# Patient Record
Sex: FEMALE | Race: WHITE | NOT HISPANIC OR LATINO | URBAN - METROPOLITAN AREA
[De-identification: names, ages, dates, MRNs, and addresses within clinical notes are randomized per-mention and may not be internally consistent; named-entity substitution may affect disease eponyms.]

---

## 2014-04-18 RX ORDER — DILTIAZEM HCL 120 MG
1 CAPSULE, EXT RELEASE 24 HR ORAL
Qty: 0 | Refills: 0 | DISCHARGE
Start: 2014-04-18

## 2017-03-02 ENCOUNTER — EMERGENCY (EMERGENCY)
Facility: HOSPITAL | Age: 57
LOS: 1 days | Discharge: HOME | End: 2017-03-02
Attending: EMERGENCY MEDICINE | Admitting: EMERGENCY MEDICINE
Payer: MEDICARE

## 2017-03-02 VITALS
DIASTOLIC BLOOD PRESSURE: 65 MMHG | HEART RATE: 99 BPM | SYSTOLIC BLOOD PRESSURE: 108 MMHG | OXYGEN SATURATION: 99 % | RESPIRATION RATE: 16 BRPM | TEMPERATURE: 98 F

## 2017-03-02 VITALS — HEIGHT: 63 IN | RESPIRATION RATE: 18 BRPM | WEIGHT: 139.99 LBS

## 2017-03-02 DIAGNOSIS — E87.0 HYPEROSMOLALITY AND HYPERNATREMIA: ICD-10-CM

## 2017-03-02 DIAGNOSIS — Z79.52 LONG TERM (CURRENT) USE OF SYSTEMIC STEROIDS: ICD-10-CM

## 2017-03-02 DIAGNOSIS — F72 SEVERE INTELLECTUAL DISABILITIES: ICD-10-CM

## 2017-03-02 DIAGNOSIS — Z79.2 LONG TERM (CURRENT) USE OF ANTIBIOTICS: ICD-10-CM

## 2017-03-02 DIAGNOSIS — Z79.82 LONG TERM (CURRENT) USE OF ASPIRIN: ICD-10-CM

## 2017-03-02 DIAGNOSIS — R79.89 OTHER SPECIFIED ABNORMAL FINDINGS OF BLOOD CHEMISTRY: ICD-10-CM

## 2017-03-02 LAB
ALBUMIN SERPL ELPH-MCNC: 3.5 G/DL — SIGNIFICANT CHANGE UP (ref 3.3–5)
ALP SERPL-CCNC: 73 U/L — SIGNIFICANT CHANGE UP (ref 40–120)
ALT FLD-CCNC: 26 U/L — SIGNIFICANT CHANGE UP (ref 12–78)
ANION GAP SERPL CALC-SCNC: 5 MMOL/L — SIGNIFICANT CHANGE UP (ref 5–17)
AST SERPL-CCNC: 16 U/L — SIGNIFICANT CHANGE UP (ref 15–37)
BASOPHILS # BLD AUTO: 0.1 K/UL — SIGNIFICANT CHANGE UP (ref 0–0.2)
BASOPHILS NFR BLD AUTO: 1.7 % — SIGNIFICANT CHANGE UP (ref 0–2)
BILIRUB SERPL-MCNC: 0.3 MG/DL — SIGNIFICANT CHANGE UP (ref 0.2–1.2)
BUN SERPL-MCNC: 18 MG/DL — SIGNIFICANT CHANGE UP (ref 7–23)
CALCIUM SERPL-MCNC: 10.1 MG/DL — SIGNIFICANT CHANGE UP (ref 8.5–10.1)
CHLORIDE SERPL-SCNC: 104 MMOL/L — SIGNIFICANT CHANGE UP (ref 96–108)
CO2 SERPL-SCNC: 35 MMOL/L — HIGH (ref 22–31)
CREAT SERPL-MCNC: 0.59 MG/DL — SIGNIFICANT CHANGE UP (ref 0.5–1.3)
EOSINOPHIL # BLD AUTO: 0.3 K/UL — SIGNIFICANT CHANGE UP (ref 0–0.5)
EOSINOPHIL NFR BLD AUTO: 3.6 % — SIGNIFICANT CHANGE UP (ref 0–6)
GLUCOSE SERPL-MCNC: 92 MG/DL — SIGNIFICANT CHANGE UP (ref 70–99)
HCT VFR BLD CALC: 48.6 % — HIGH (ref 34.5–45)
HGB BLD-MCNC: 15.5 G/DL — SIGNIFICANT CHANGE UP (ref 11.5–15.5)
LYMPHOCYTES # BLD AUTO: 4.2 K/UL — HIGH (ref 1–3.3)
LYMPHOCYTES # BLD AUTO: 49.6 % — HIGH (ref 13–44)
MCHC RBC-ENTMCNC: 31.5 PG — SIGNIFICANT CHANGE UP (ref 27–34)
MCHC RBC-ENTMCNC: 31.8 GM/DL — LOW (ref 32–36)
MCV RBC AUTO: 99.1 FL — SIGNIFICANT CHANGE UP (ref 80–100)
MONOCYTES # BLD AUTO: 1.3 K/UL — HIGH (ref 0–0.9)
MONOCYTES NFR BLD AUTO: 15.3 % — HIGH (ref 1–9)
NEUTROPHILS # BLD AUTO: 2.6 K/UL — SIGNIFICANT CHANGE UP (ref 1.8–7.4)
NEUTROPHILS NFR BLD AUTO: 29.8 % — LOW (ref 43–77)
PLATELET # BLD AUTO: 303 K/UL — SIGNIFICANT CHANGE UP (ref 150–400)
POTASSIUM SERPL-MCNC: 4.1 MMOL/L — SIGNIFICANT CHANGE UP (ref 3.5–5.3)
POTASSIUM SERPL-SCNC: 4.1 MMOL/L — SIGNIFICANT CHANGE UP (ref 3.5–5.3)
PROT SERPL-MCNC: 7.8 G/DL — SIGNIFICANT CHANGE UP (ref 6–8.3)
RBC # BLD: 4.91 M/UL — SIGNIFICANT CHANGE UP (ref 3.8–5.2)
RBC # FLD: 14.4 % — SIGNIFICANT CHANGE UP (ref 10.3–14.5)
SODIUM SERPL-SCNC: 144 MMOL/L — SIGNIFICANT CHANGE UP (ref 135–145)
WBC # BLD: 8.6 K/UL — SIGNIFICANT CHANGE UP (ref 3.8–10.5)
WBC # FLD AUTO: 8.6 K/UL — SIGNIFICANT CHANGE UP (ref 3.8–10.5)

## 2017-03-02 PROCEDURE — 99283 EMERGENCY DEPT VISIT LOW MDM: CPT | Mod: 25

## 2017-03-02 PROCEDURE — 85027 COMPLETE CBC AUTOMATED: CPT

## 2017-03-02 PROCEDURE — 93005 ELECTROCARDIOGRAM TRACING: CPT

## 2017-03-02 PROCEDURE — 80053 COMPREHEN METABOLIC PANEL: CPT

## 2017-03-02 PROCEDURE — 99284 EMERGENCY DEPT VISIT MOD MDM: CPT

## 2017-03-02 NOTE — ED PROVIDER NOTE - MEDICAL DECISION MAKING DETAILS
pt sent from group home by Dr. Ad Mosqueda for elevated sodium level on routine screening.  repeat blood work.

## 2017-03-02 NOTE — ED ADULT NURSE NOTE - CHPI ED SYMPTOMS NEG
no pain/no back pain/no fever/no dizziness/no headache/no nausea/no vomiting/no decreased eating/drinking/no chills/no loss of consciousness

## 2017-03-02 NOTE — ED PROVIDER NOTE - OBJECTIVE STATEMENT
55 y/o F with severe MR from a group home sent in by Dr. Ad Mosqueda for elevated sodium.  Caretaker states pt is at baseline with her mental status and the results were on routine blood work.  pt is unable to provide any hx secondary to mental status.

## 2017-03-02 NOTE — ED ADULT NURSE NOTE - OBJECTIVE STATEMENT
Pt alert, accompanied by caregiver, brought to ED for elevated serum sodium, labs drawn and sent, advised caregiver on plan of care/hand hygiene, verbalized understanding, awaititng dispo.

## 2017-03-17 ENCOUNTER — NON-APPOINTMENT (OUTPATIENT)
Age: 57
End: 2017-03-17

## 2017-03-17 ENCOUNTER — APPOINTMENT (OUTPATIENT)
Dept: CARDIOLOGY | Facility: CLINIC | Age: 57
End: 2017-03-17

## 2017-09-29 ENCOUNTER — NON-APPOINTMENT (OUTPATIENT)
Age: 57
End: 2017-09-29

## 2017-09-29 ENCOUNTER — APPOINTMENT (OUTPATIENT)
Dept: CARDIOLOGY | Facility: CLINIC | Age: 57
End: 2017-09-29
Payer: MEDICARE

## 2017-09-29 VITALS — WEIGHT: 145 LBS | BODY MASS INDEX: 26.52 KG/M2 | HEART RATE: 57 BPM

## 2017-09-29 VITALS — SYSTOLIC BLOOD PRESSURE: 126 MMHG | DIASTOLIC BLOOD PRESSURE: 76 MMHG

## 2017-09-29 PROCEDURE — 93000 ELECTROCARDIOGRAM COMPLETE: CPT

## 2017-09-29 PROCEDURE — 99214 OFFICE O/P EST MOD 30 MIN: CPT | Mod: 25

## 2017-11-10 ENCOUNTER — APPOINTMENT (OUTPATIENT)
Dept: CARDIOLOGY | Facility: CLINIC | Age: 57
End: 2017-11-10
Payer: MEDICARE

## 2017-11-10 ENCOUNTER — NON-APPOINTMENT (OUTPATIENT)
Age: 57
End: 2017-11-10

## 2017-11-10 VITALS — HEIGHT: 62 IN | DIASTOLIC BLOOD PRESSURE: 65 MMHG | HEART RATE: 82 BPM | SYSTOLIC BLOOD PRESSURE: 112 MMHG

## 2017-11-10 PROCEDURE — 93000 ELECTROCARDIOGRAM COMPLETE: CPT

## 2017-11-10 PROCEDURE — 99214 OFFICE O/P EST MOD 30 MIN: CPT | Mod: 25

## 2017-11-22 RX ORDER — DILTIAZEM HYDROCHLORIDE 300 MG/1
300 CAPSULE, EXTENDED RELEASE ORAL DAILY
Qty: 90 | Refills: 1 | Status: ACTIVE | COMMUNITY
Start: 2017-09-29 | End: 1900-01-01

## 2018-02-09 ENCOUNTER — APPOINTMENT (OUTPATIENT)
Dept: CARDIOLOGY | Facility: CLINIC | Age: 58
End: 2018-02-09
Payer: MEDICARE

## 2018-02-09 ENCOUNTER — NON-APPOINTMENT (OUTPATIENT)
Age: 58
End: 2018-02-09

## 2018-02-09 VITALS — WEIGHT: 145 LBS | BODY MASS INDEX: 26.68 KG/M2 | HEIGHT: 62 IN

## 2018-02-09 VITALS — DIASTOLIC BLOOD PRESSURE: 50 MMHG | SYSTOLIC BLOOD PRESSURE: 110 MMHG

## 2018-02-09 PROCEDURE — 99214 OFFICE O/P EST MOD 30 MIN: CPT | Mod: 25

## 2018-02-09 PROCEDURE — 93000 ELECTROCARDIOGRAM COMPLETE: CPT

## 2018-05-21 ENCOUNTER — EMERGENCY (EMERGENCY)
Facility: HOSPITAL | Age: 58
LOS: 1 days | Discharge: HOME | End: 2018-05-21
Attending: EMERGENCY MEDICINE
Payer: MEDICARE

## 2018-05-21 VITALS
TEMPERATURE: 96 F | RESPIRATION RATE: 18 BRPM | DIASTOLIC BLOOD PRESSURE: 74 MMHG | HEART RATE: 77 BPM | OXYGEN SATURATION: 96 % | SYSTOLIC BLOOD PRESSURE: 144 MMHG | HEIGHT: 63 IN | WEIGHT: 139.99 LBS

## 2018-05-21 LAB
ALBUMIN SERPL ELPH-MCNC: 3.4 G/DL — SIGNIFICANT CHANGE UP (ref 3.3–5)
ALP SERPL-CCNC: 88 U/L — SIGNIFICANT CHANGE UP (ref 40–120)
ALT FLD-CCNC: 25 U/L — SIGNIFICANT CHANGE UP (ref 12–78)
ANION GAP SERPL CALC-SCNC: 10 MMOL/L — SIGNIFICANT CHANGE UP (ref 5–17)
APPEARANCE UR: ABNORMAL
AST SERPL-CCNC: 14 U/L — LOW (ref 15–37)
BACTERIA # UR AUTO: ABNORMAL
BASOPHILS # BLD AUTO: 0.2 K/UL — SIGNIFICANT CHANGE UP (ref 0–0.2)
BASOPHILS NFR BLD AUTO: 0.8 % — SIGNIFICANT CHANGE UP (ref 0–2)
BILIRUB SERPL-MCNC: 0.4 MG/DL — SIGNIFICANT CHANGE UP (ref 0.2–1.2)
BILIRUB UR-MCNC: ABNORMAL
BUN SERPL-MCNC: 28 MG/DL — HIGH (ref 7–23)
CALCIUM SERPL-MCNC: 8.7 MG/DL — SIGNIFICANT CHANGE UP (ref 8.5–10.1)
CHLORIDE SERPL-SCNC: 106 MMOL/L — SIGNIFICANT CHANGE UP (ref 96–108)
CK MB BLD-MCNC: 6.7 % — HIGH (ref 0–3.5)
CK MB CFR SERPL CALC: 1.6 NG/ML — SIGNIFICANT CHANGE UP (ref 0–3.6)
CK SERPL-CCNC: 24 U/L — LOW (ref 26–192)
CO2 SERPL-SCNC: 26 MMOL/L — SIGNIFICANT CHANGE UP (ref 22–31)
COLOR SPEC: YELLOW — SIGNIFICANT CHANGE UP
COMMENT - URINE: SIGNIFICANT CHANGE UP
CREAT SERPL-MCNC: 0.62 MG/DL — SIGNIFICANT CHANGE UP (ref 0.5–1.3)
DIFF PNL FLD: ABNORMAL
EOSINOPHIL # BLD AUTO: 0 K/UL — SIGNIFICANT CHANGE UP (ref 0–0.5)
EOSINOPHIL NFR BLD AUTO: 0.1 % — SIGNIFICANT CHANGE UP (ref 0–6)
EPI CELLS # UR: SIGNIFICANT CHANGE UP
GLUCOSE SERPL-MCNC: 97 MG/DL — SIGNIFICANT CHANGE UP (ref 70–99)
GLUCOSE UR QL: NEGATIVE — SIGNIFICANT CHANGE UP
HCT VFR BLD CALC: 45.3 % — HIGH (ref 34.5–45)
HGB BLD-MCNC: 15 G/DL — SIGNIFICANT CHANGE UP (ref 11.5–15.5)
HYALINE CASTS # UR AUTO: ABNORMAL /LPF
INR BLD: 1.18 RATIO — HIGH (ref 0.88–1.16)
KETONES UR-MCNC: ABNORMAL
LACTATE SERPL-SCNC: 1.5 MMOL/L — SIGNIFICANT CHANGE UP (ref 0.7–2)
LEUKOCYTE ESTERASE UR-ACNC: ABNORMAL
LYMPHOCYTES # BLD AUTO: 21 % — SIGNIFICANT CHANGE UP (ref 13–44)
LYMPHOCYTES # BLD AUTO: 4.6 K/UL — HIGH (ref 1–3.3)
MCHC RBC-ENTMCNC: 32.8 PG — SIGNIFICANT CHANGE UP (ref 27–34)
MCHC RBC-ENTMCNC: 33.1 GM/DL — SIGNIFICANT CHANGE UP (ref 32–36)
MCV RBC AUTO: 98.8 FL — SIGNIFICANT CHANGE UP (ref 80–100)
MONOCYTES # BLD AUTO: 3.4 K/UL — HIGH (ref 0–0.9)
MONOCYTES NFR BLD AUTO: 15.5 % — HIGH (ref 1–9)
NEUTROPHILS # BLD AUTO: 13.8 K/UL — HIGH (ref 1.8–7.4)
NEUTROPHILS NFR BLD AUTO: 62.6 % — SIGNIFICANT CHANGE UP (ref 43–77)
NITRITE UR-MCNC: NEGATIVE — SIGNIFICANT CHANGE UP
PH UR: 5 — SIGNIFICANT CHANGE UP (ref 5–8)
PLATELET # BLD AUTO: 214 K/UL — SIGNIFICANT CHANGE UP (ref 150–400)
POTASSIUM SERPL-MCNC: 4 MMOL/L — SIGNIFICANT CHANGE UP (ref 3.5–5.3)
POTASSIUM SERPL-SCNC: 4 MMOL/L — SIGNIFICANT CHANGE UP (ref 3.5–5.3)
PROT SERPL-MCNC: 7.2 G/DL — SIGNIFICANT CHANGE UP (ref 6–8.3)
PROT UR-MCNC: NEGATIVE — SIGNIFICANT CHANGE UP
PROTHROM AB SERPL-ACNC: 12.9 SEC — HIGH (ref 9.8–12.7)
RBC # BLD: 4.58 M/UL — SIGNIFICANT CHANGE UP (ref 3.8–5.2)
RBC # FLD: 15.7 % — HIGH (ref 10.3–14.5)
RBC CASTS # UR COMP ASSIST: ABNORMAL /HPF (ref 0–4)
SODIUM SERPL-SCNC: 142 MMOL/L — SIGNIFICANT CHANGE UP (ref 135–145)
SP GR SPEC: 1.01 — SIGNIFICANT CHANGE UP (ref 1.01–1.02)
TROPONIN I SERPL-MCNC: <.015 NG/ML — SIGNIFICANT CHANGE UP (ref 0.01–0.04)
UROBILINOGEN FLD QL: 1
WBC # BLD: 22 K/UL — HIGH (ref 3.8–10.5)
WBC # FLD AUTO: 22 K/UL — HIGH (ref 3.8–10.5)
WBC UR QL: SIGNIFICANT CHANGE UP

## 2018-05-21 PROCEDURE — 99285 EMERGENCY DEPT VISIT HI MDM: CPT

## 2018-05-21 PROCEDURE — 93010 ELECTROCARDIOGRAM REPORT: CPT

## 2018-05-21 RX ORDER — SODIUM CHLORIDE 9 MG/ML
1000 INJECTION INTRAMUSCULAR; INTRAVENOUS; SUBCUTANEOUS ONCE
Qty: 0 | Refills: 0 | Status: COMPLETED | OUTPATIENT
Start: 2018-05-21 | End: 2018-05-21

## 2018-05-21 RX ADMIN — SODIUM CHLORIDE 1000 MILLILITER(S): 9 INJECTION INTRAMUSCULAR; INTRAVENOUS; SUBCUTANEOUS at 23:13

## 2018-05-21 NOTE — ED ADULT NURSE REASSESSMENT NOTE - NS ED NURSE REASSESS COMMENT FT1
Received patient from previous shift with family member at the bedside informed of the plan of care.

## 2018-05-21 NOTE — ED ADULT TRIAGE NOTE - CHIEF COMPLAINT QUOTE
Patient brought in by family and care giver as reported has been having loose stool since yesterday was noted to have temperature at home 99.5 F prior to ED arrival

## 2018-05-22 VITALS
OXYGEN SATURATION: 97 % | RESPIRATION RATE: 18 BRPM | DIASTOLIC BLOOD PRESSURE: 58 MMHG | SYSTOLIC BLOOD PRESSURE: 111 MMHG | HEART RATE: 92 BPM

## 2018-05-22 LAB
BASOPHILS # BLD AUTO: 0.1 K/UL — SIGNIFICANT CHANGE UP (ref 0–0.2)
BASOPHILS NFR BLD AUTO: 0.5 % — SIGNIFICANT CHANGE UP (ref 0–2)
EOSINOPHIL # BLD AUTO: 0 K/UL — SIGNIFICANT CHANGE UP (ref 0–0.5)
EOSINOPHIL NFR BLD AUTO: 0.2 % — SIGNIFICANT CHANGE UP (ref 0–6)
HCT VFR BLD CALC: 36 % — SIGNIFICANT CHANGE UP (ref 34.5–45)
HGB BLD-MCNC: 12.2 G/DL — SIGNIFICANT CHANGE UP (ref 11.5–15.5)
LYMPHOCYTES # BLD AUTO: 24.9 % — SIGNIFICANT CHANGE UP (ref 13–44)
LYMPHOCYTES # BLD AUTO: 4.3 K/UL — HIGH (ref 1–3.3)
MCHC RBC-ENTMCNC: 33.4 PG — SIGNIFICANT CHANGE UP (ref 27–34)
MCHC RBC-ENTMCNC: 33.8 GM/DL — SIGNIFICANT CHANGE UP (ref 32–36)
MCV RBC AUTO: 98.6 FL — SIGNIFICANT CHANGE UP (ref 80–100)
MONOCYTES # BLD AUTO: 2.3 K/UL — HIGH (ref 0–0.9)
MONOCYTES NFR BLD AUTO: 13.7 % — HIGH (ref 1–9)
NEUTROPHILS # BLD AUTO: 10.4 K/UL — HIGH (ref 1.8–7.4)
NEUTROPHILS NFR BLD AUTO: 60.6 % — SIGNIFICANT CHANGE UP (ref 43–77)
PLATELET # BLD AUTO: 159 K/UL — SIGNIFICANT CHANGE UP (ref 150–400)
RBC # BLD: 3.65 M/UL — LOW (ref 3.8–5.2)
RBC # FLD: 15.2 % — HIGH (ref 10.3–14.5)
WBC # BLD: 17.1 K/UL — HIGH (ref 3.8–10.5)
WBC # FLD AUTO: 17.1 K/UL — HIGH (ref 3.8–10.5)

## 2018-05-22 PROCEDURE — 96375 TX/PRO/DX INJ NEW DRUG ADDON: CPT

## 2018-05-22 PROCEDURE — 82553 CREATINE MB FRACTION: CPT

## 2018-05-22 PROCEDURE — 96365 THER/PROPH/DIAG IV INF INIT: CPT

## 2018-05-22 PROCEDURE — 87040 BLOOD CULTURE FOR BACTERIA: CPT

## 2018-05-22 PROCEDURE — 71250 CT THORAX DX C-: CPT | Mod: 26

## 2018-05-22 PROCEDURE — 71250 CT THORAX DX C-: CPT

## 2018-05-22 PROCEDURE — 93005 ELECTROCARDIOGRAM TRACING: CPT

## 2018-05-22 PROCEDURE — 99284 EMERGENCY DEPT VISIT MOD MDM: CPT | Mod: 25

## 2018-05-22 PROCEDURE — 85610 PROTHROMBIN TIME: CPT

## 2018-05-22 PROCEDURE — 84484 ASSAY OF TROPONIN QUANT: CPT

## 2018-05-22 PROCEDURE — 82550 ASSAY OF CK (CPK): CPT

## 2018-05-22 PROCEDURE — 83605 ASSAY OF LACTIC ACID: CPT

## 2018-05-22 PROCEDURE — 96361 HYDRATE IV INFUSION ADD-ON: CPT

## 2018-05-22 PROCEDURE — 80053 COMPREHEN METABOLIC PANEL: CPT

## 2018-05-22 PROCEDURE — 85027 COMPLETE CBC AUTOMATED: CPT

## 2018-05-22 PROCEDURE — 74176 CT ABD & PELVIS W/O CONTRAST: CPT

## 2018-05-22 PROCEDURE — 81001 URINALYSIS AUTO W/SCOPE: CPT

## 2018-05-22 PROCEDURE — 74176 CT ABD & PELVIS W/O CONTRAST: CPT | Mod: 26

## 2018-05-22 PROCEDURE — 87086 URINE CULTURE/COLONY COUNT: CPT

## 2018-05-22 RX ORDER — PIPERACILLIN AND TAZOBACTAM 4; .5 G/20ML; G/20ML
3.38 INJECTION, POWDER, LYOPHILIZED, FOR SOLUTION INTRAVENOUS ONCE
Qty: 0 | Refills: 0 | Status: COMPLETED | OUTPATIENT
Start: 2018-05-22 | End: 2018-05-22

## 2018-05-22 RX ORDER — PANTOPRAZOLE SODIUM 20 MG/1
40 TABLET, DELAYED RELEASE ORAL ONCE
Qty: 0 | Refills: 0 | Status: COMPLETED | OUTPATIENT
Start: 2018-05-22 | End: 2018-05-22

## 2018-05-22 RX ADMIN — PIPERACILLIN AND TAZOBACTAM 200 GRAM(S): 4; .5 INJECTION, POWDER, LYOPHILIZED, FOR SOLUTION INTRAVENOUS at 01:14

## 2018-05-22 RX ADMIN — SODIUM CHLORIDE 1000 MILLILITER(S): 9 INJECTION INTRAMUSCULAR; INTRAVENOUS; SUBCUTANEOUS at 00:28

## 2018-05-22 RX ADMIN — SODIUM CHLORIDE 1000 MILLILITER(S): 9 INJECTION INTRAMUSCULAR; INTRAVENOUS; SUBCUTANEOUS at 00:27

## 2018-05-22 RX ADMIN — PIPERACILLIN AND TAZOBACTAM 3.38 GRAM(S): 4; .5 INJECTION, POWDER, LYOPHILIZED, FOR SOLUTION INTRAVENOUS at 01:46

## 2018-05-22 RX ADMIN — SODIUM CHLORIDE 1000 MILLILITER(S): 9 INJECTION INTRAMUSCULAR; INTRAVENOUS; SUBCUTANEOUS at 00:29

## 2018-05-22 RX ADMIN — PANTOPRAZOLE SODIUM 40 MILLIGRAM(S): 20 TABLET, DELAYED RELEASE ORAL at 01:14

## 2018-05-22 NOTE — ED PROVIDER NOTE - PMH
Chest pain    COPD (chronic obstructive pulmonary disease)    MR (mental retardation), severe  immobilty sec to severe dissability  Osteoporosis    PVD (peripheral vascular disease)    Scoliosis    Seizure

## 2018-05-22 NOTE — ED PROVIDER NOTE - OBJECTIVE STATEMENT
57 female presents to ER with aide at the bedside states patient has had temperature of 99, not eating or drinking for the past 2 days. 57 female presents to ER with aide at the bedside states patient has had temperature of 99, not eating or drinking for the past 2 days, loose stool.

## 2018-05-23 LAB
CULTURE RESULTS: NO GROWTH — SIGNIFICANT CHANGE UP
SPECIMEN SOURCE: SIGNIFICANT CHANGE UP

## 2018-05-27 LAB
CULTURE RESULTS: SIGNIFICANT CHANGE UP
CULTURE RESULTS: SIGNIFICANT CHANGE UP
SPECIMEN SOURCE: SIGNIFICANT CHANGE UP
SPECIMEN SOURCE: SIGNIFICANT CHANGE UP

## 2018-06-26 ENCOUNTER — NON-APPOINTMENT (OUTPATIENT)
Age: 58
End: 2018-06-26

## 2018-06-26 ENCOUNTER — APPOINTMENT (OUTPATIENT)
Dept: CARDIOLOGY | Facility: CLINIC | Age: 58
End: 2018-06-26
Payer: MEDICARE

## 2018-06-26 VITALS
SYSTOLIC BLOOD PRESSURE: 110 MMHG | WEIGHT: 142 LBS | HEIGHT: 62 IN | DIASTOLIC BLOOD PRESSURE: 73 MMHG | HEART RATE: 52 BPM | OXYGEN SATURATION: 93 % | BODY MASS INDEX: 26.13 KG/M2

## 2018-06-26 DIAGNOSIS — D64.9 ANEMIA, UNSPECIFIED: ICD-10-CM

## 2018-06-26 PROCEDURE — 99214 OFFICE O/P EST MOD 30 MIN: CPT | Mod: 25

## 2018-06-26 PROCEDURE — 93000 ELECTROCARDIOGRAM COMPLETE: CPT

## 2018-08-07 ENCOUNTER — APPOINTMENT (OUTPATIENT)
Dept: CARDIOLOGY | Facility: CLINIC | Age: 58
End: 2018-08-07
Payer: MEDICARE

## 2018-08-07 ENCOUNTER — NON-APPOINTMENT (OUTPATIENT)
Age: 58
End: 2018-08-07

## 2018-08-07 VITALS — HEIGHT: 62 IN | WEIGHT: 142 LBS | BODY MASS INDEX: 26.13 KG/M2 | HEART RATE: 60 BPM | OXYGEN SATURATION: 92 %

## 2018-08-07 VITALS — BODY MASS INDEX: 26.31 KG/M2 | HEIGHT: 62 IN | WEIGHT: 143 LBS

## 2018-08-07 VITALS — DIASTOLIC BLOOD PRESSURE: 66 MMHG | SYSTOLIC BLOOD PRESSURE: 112 MMHG

## 2018-08-07 DIAGNOSIS — Z01.810 ENCOUNTER FOR PREPROCEDURAL CARDIOVASCULAR EXAMINATION: ICD-10-CM

## 2018-08-07 PROCEDURE — 99214 OFFICE O/P EST MOD 30 MIN: CPT | Mod: 25

## 2018-08-07 PROCEDURE — 93000 ELECTROCARDIOGRAM COMPLETE: CPT

## 2018-08-07 RX ORDER — MONTELUKAST 10 MG/1
10 TABLET, FILM COATED ORAL
Refills: 2 | Status: ACTIVE | COMMUNITY

## 2018-08-07 RX ORDER — FOLIC ACID 1 MG/1
1 TABLET ORAL DAILY
Refills: 0 | Status: ACTIVE | COMMUNITY

## 2018-08-10 ENCOUNTER — APPOINTMENT (OUTPATIENT)
Dept: CARDIOLOGY | Facility: CLINIC | Age: 58
End: 2018-08-10

## 2018-10-05 ENCOUNTER — APPOINTMENT (OUTPATIENT)
Dept: CARDIOLOGY | Facility: CLINIC | Age: 58
End: 2018-10-05

## 2018-12-11 ENCOUNTER — APPOINTMENT (OUTPATIENT)
Dept: CARDIOLOGY | Facility: CLINIC | Age: 58
End: 2018-12-11

## 2019-03-28 ENCOUNTER — NON-APPOINTMENT (OUTPATIENT)
Age: 59
End: 2019-03-28

## 2019-03-28 ENCOUNTER — APPOINTMENT (OUTPATIENT)
Dept: CARDIOLOGY | Facility: CLINIC | Age: 59
End: 2019-03-28
Payer: MEDICARE

## 2019-03-28 VITALS — BODY MASS INDEX: 26.31 KG/M2 | WEIGHT: 143 LBS | HEIGHT: 62 IN

## 2019-03-28 VITALS — DIASTOLIC BLOOD PRESSURE: 60 MMHG | SYSTOLIC BLOOD PRESSURE: 110 MMHG

## 2019-03-28 DIAGNOSIS — F79 UNSPECIFIED INTELLECTUAL DISABILITIES: ICD-10-CM

## 2019-03-28 DIAGNOSIS — J45.909 UNSPECIFIED ASTHMA, UNCOMPLICATED: ICD-10-CM

## 2019-03-28 PROCEDURE — 93000 ELECTROCARDIOGRAM COMPLETE: CPT

## 2019-03-28 PROCEDURE — 99214 OFFICE O/P EST MOD 30 MIN: CPT | Mod: 25

## 2019-03-28 RX ORDER — TRAVOPROST (BENZALKONIUM) 0.004 %
0 DROPS OPHTHALMIC (EYE)
Refills: 0 | Status: ACTIVE | COMMUNITY

## 2019-03-28 RX ORDER — IPRATROPIUM BROMIDE AND ALBUTEROL SULFATE 2.5; .5 MG/3ML; MG/3ML
0.5-2.5 (3) SOLUTION RESPIRATORY (INHALATION) 4 TIMES DAILY
Refills: 4 | Status: ACTIVE | COMMUNITY

## 2019-03-28 NOTE — HISTORY OF PRESENT ILLNESS
[FreeTextEntry1] : This is a 59 Y/O female who is mentally challenged residing in a group home with H/O well controlled HTN,  PAF on Cardizem, Asthma, Osteoporosis, recurrent atrial fibrillation. Patient  non communicative ,\par \par Patient is not very cooperative today , \par \par \par Patient blood pressure is controlled , lipid profile normal range  her asthma well controlled \par \par Patient not on ecotrin as patient gets bruises very easily \par \par

## 2019-03-28 NOTE — PHYSICAL EXAM
[General Appearance - Well Developed] : well developed [Normal Appearance] : normal appearance [Well Groomed] : well groomed [General Appearance - Well Nourished] : well nourished [No Deformities] : no deformities [General Appearance - In No Acute Distress] : no acute distress [Normal Conjunctiva] : the conjunctiva exhibited no abnormalities [] : no respiratory distress [Respiration, Rhythm And Depth] : normal respiratory rhythm and effort [Exaggerated Use Of Accessory Muscles For Inspiration] : no accessory muscle use [Heart Sounds] : normal S1 and S2 [Normal] : the heart rate was normal [Irregularly Irregular] : the rhythm was irregularly irregular [Abdomen Soft] : soft [Skin Color & Pigmentation] : normal skin color and pigmentation [FreeTextEntry1] : Mentally challenged

## 2019-03-28 NOTE — ASSESSMENT
[FreeTextEntry1] : Patient with advanced mental retardation ,uncooperative  CHADS vasc 1  .Patient is high risk for falls \par after discussion with parents , though patient at mild increased risk of stroke ,atrial fibrillation with controlled rate   \par \par  asthma controlled \par \par \par patients parents understand the risk and benefits .monitor TSH level , \par \par continue her current medication , \par \par follow up after 6 months

## 2019-03-28 NOTE — REASON FOR VISIT
[Follow-Up - Clinic] : a clinic follow-up of [Atrial Fibrillation] : atrial fibrillation [Hypertension] : hypertension [Medication Management] : Medication management [FreeTextEntry1] : f/u

## 2019-10-03 ENCOUNTER — APPOINTMENT (OUTPATIENT)
Dept: CARDIOLOGY | Facility: CLINIC | Age: 59
End: 2019-10-03
Payer: MEDICARE

## 2019-10-03 ENCOUNTER — NON-APPOINTMENT (OUTPATIENT)
Age: 59
End: 2019-10-03

## 2019-10-03 VITALS
WEIGHT: 142 LBS | OXYGEN SATURATION: 94 % | BODY MASS INDEX: 26.13 KG/M2 | SYSTOLIC BLOOD PRESSURE: 106 MMHG | HEART RATE: 62 BPM | DIASTOLIC BLOOD PRESSURE: 70 MMHG | HEIGHT: 62 IN

## 2019-10-03 DIAGNOSIS — I10 ESSENTIAL (PRIMARY) HYPERTENSION: ICD-10-CM

## 2019-10-03 DIAGNOSIS — R94.31 ABNORMAL ELECTROCARDIOGRAM [ECG] [EKG]: ICD-10-CM

## 2019-10-03 DIAGNOSIS — I48.0 PAROXYSMAL ATRIAL FIBRILLATION: ICD-10-CM

## 2019-10-03 PROCEDURE — 93000 ELECTROCARDIOGRAM COMPLETE: CPT

## 2019-10-03 PROCEDURE — 99214 OFFICE O/P EST MOD 30 MIN: CPT | Mod: 25

## 2019-10-03 RX ORDER — HYDROXYZINE HCL 25 MG
25 TABLET ORAL
Refills: 0 | Status: ACTIVE | COMMUNITY

## 2019-10-03 RX ORDER — BUDESONIDE 0.5 MG/2ML
0.5 INHALANT ORAL DAILY
Refills: 0 | Status: ACTIVE | COMMUNITY
Start: 2018-02-04

## 2019-10-03 RX ORDER — BUDESONIDE AND FORMOTEROL FUMARATE DIHYDRATE 160; 4.5 UG/1; UG/1
160-4.5 AEROSOL RESPIRATORY (INHALATION) TWICE DAILY
Qty: 1 | Refills: 5 | Status: ACTIVE | COMMUNITY

## 2019-10-03 RX ORDER — DIVALPROEX SODIUM 125 MG/1
125 CAPSULE, COATED PELLETS ORAL
Refills: 0 | Status: ACTIVE | COMMUNITY

## 2019-10-03 RX ORDER — ASPIRIN 325 MG/1
325 TABLET, FILM COATED ORAL
Qty: 90 | Refills: 3 | Status: DISCONTINUED | COMMUNITY
End: 2019-10-03

## 2019-10-03 RX ORDER — ALBUTEROL 90 MCG
90 AEROSOL (GRAM) INHALATION
Refills: 0 | Status: ACTIVE | COMMUNITY

## 2019-10-03 RX ORDER — ACETAMINOPHEN, DEXTROMETHORPHAN HBR, DIPHENPHYDRAMINE HCL, GUAIFENESIN
KIT
Refills: 0 | Status: ACTIVE | COMMUNITY

## 2019-10-03 RX ORDER — DIPHENHYDRAMINE HCL 25 MG/1
25 CAPSULE ORAL
Refills: 0 | Status: ACTIVE | COMMUNITY

## 2019-10-03 RX ORDER — LEVOTHYROXINE SODIUM 0.03 MG/1
25 TABLET ORAL DAILY
Qty: 90 | Refills: 3 | Status: ACTIVE | COMMUNITY
Start: 2018-11-30

## 2019-10-03 RX ORDER — DENOSUMAB 60 MG/ML
60 INJECTION SUBCUTANEOUS
Refills: 0 | Status: ACTIVE | COMMUNITY

## 2019-10-03 RX ORDER — ALBUTEROL SULFATE 0.63 MG/3ML
0.63 SOLUTION RESPIRATORY (INHALATION)
Refills: 0 | Status: ACTIVE | COMMUNITY

## 2019-10-03 RX ORDER — CHLORHEXIDINE GLUCONATE 4 %
325 (65 FE) LIQUID (ML) TOPICAL DAILY
Refills: 0 | Status: ACTIVE | COMMUNITY

## 2019-10-03 NOTE — PHYSICAL EXAM
[General Appearance - Well Developed] : well developed [Normal Appearance] : normal appearance [Well Groomed] : well groomed [General Appearance - Well Nourished] : well nourished [General Appearance - In No Acute Distress] : no acute distress [Normal Conjunctiva] : the conjunctiva exhibited no abnormalities [No Deformities] : no deformities [Respiration, Rhythm And Depth] : normal respiratory rhythm and effort [] : no respiratory distress [Heart Sounds] : normal S1 and S2 [Exaggerated Use Of Accessory Muscles For Inspiration] : no accessory muscle use [Normal] : the heart rate was normal [Irregularly Irregular] : the rhythm was irregularly irregular [Abdomen Soft] : soft [Skin Color & Pigmentation] : normal skin color and pigmentation [FreeTextEntry1] : Mentally challenged

## 2019-10-03 NOTE — HISTORY OF PRESENT ILLNESS
[FreeTextEntry1] : This is a 58 Y/O female who is mentally challenged residing in a group home with H/O well controlled HTN,  PAF on Cardizem, Asthma, Osteoporosis, recurrent atrial fibrillation. Patient  non communicative ,\par \par Patient is not very cooperative today , \par \par \par Patient blood pressure is controlled , lipid profile normal range  her asthma well controlled \par \par Patient not on ecotrin as patient gets bruises very easily \par \par Recent blood work (8/27/19) , TG 51, HDL 44, LDL 70\par \par

## 2019-11-04 RX ADMIN — Medication 3 MILLILITER(S): at 14:19

## 2019-12-02 ENCOUNTER — INPATIENT (INPATIENT)
Facility: HOSPITAL | Age: 59
LOS: 6 days | Discharge: NURSING, REHAB OR LONG-TERM CARE | DRG: 193 | End: 2019-12-09
Attending: FAMILY MEDICINE | Admitting: FAMILY MEDICINE
Payer: MEDICARE

## 2019-12-02 VITALS
OXYGEN SATURATION: 98 % | HEIGHT: 62 IN | TEMPERATURE: 98 F | DIASTOLIC BLOOD PRESSURE: 60 MMHG | HEART RATE: 90 BPM | WEIGHT: 119.93 LBS | RESPIRATION RATE: 18 BRPM | SYSTOLIC BLOOD PRESSURE: 108 MMHG

## 2019-12-02 DIAGNOSIS — J18.9 PNEUMONIA, UNSPECIFIED ORGANISM: ICD-10-CM

## 2019-12-02 LAB
ALBUMIN SERPL ELPH-MCNC: 3.4 G/DL — SIGNIFICANT CHANGE UP (ref 3.3–5)
ALP SERPL-CCNC: 79 U/L — SIGNIFICANT CHANGE UP (ref 40–120)
ALT FLD-CCNC: 28 U/L — SIGNIFICANT CHANGE UP (ref 12–78)
ANION GAP SERPL CALC-SCNC: 7 MMOL/L — SIGNIFICANT CHANGE UP (ref 5–17)
APPEARANCE UR: ABNORMAL
APTT BLD: 34.7 SEC — SIGNIFICANT CHANGE UP (ref 28.5–37)
AST SERPL-CCNC: 46 U/L — HIGH (ref 15–37)
BACTERIA # UR AUTO: ABNORMAL
BASOPHILS # BLD AUTO: 0 K/UL — SIGNIFICANT CHANGE UP (ref 0–0.2)
BASOPHILS NFR BLD AUTO: 0 % — SIGNIFICANT CHANGE UP (ref 0–2)
BILIRUB SERPL-MCNC: 0.6 MG/DL — SIGNIFICANT CHANGE UP (ref 0.2–1.2)
BILIRUB UR-MCNC: NEGATIVE — SIGNIFICANT CHANGE UP
BUN SERPL-MCNC: 17 MG/DL — SIGNIFICANT CHANGE UP (ref 7–23)
CALCIUM SERPL-MCNC: 9.3 MG/DL — SIGNIFICANT CHANGE UP (ref 8.5–10.1)
CHLORIDE SERPL-SCNC: 106 MMOL/L — SIGNIFICANT CHANGE UP (ref 96–108)
CK MB CFR SERPL CALC: 1.9 NG/ML — SIGNIFICANT CHANGE UP (ref 0–3.6)
CO2 SERPL-SCNC: 29 MMOL/L — SIGNIFICANT CHANGE UP (ref 22–31)
COLOR SPEC: ABNORMAL
COMMENT - URINE: SIGNIFICANT CHANGE UP
CREAT SERPL-MCNC: 0.67 MG/DL — SIGNIFICANT CHANGE UP (ref 0.5–1.3)
DIFF PNL FLD: ABNORMAL
EOSINOPHIL # BLD AUTO: 0 K/UL — SIGNIFICANT CHANGE UP (ref 0–0.5)
EOSINOPHIL NFR BLD AUTO: 0 % — SIGNIFICANT CHANGE UP (ref 0–6)
EPI CELLS # UR: SIGNIFICANT CHANGE UP
FLU A RESULT: SIGNIFICANT CHANGE UP
FLU A RESULT: SIGNIFICANT CHANGE UP
FLUAV AG NPH QL: SIGNIFICANT CHANGE UP
FLUBV AG NPH QL: SIGNIFICANT CHANGE UP
GLUCOSE SERPL-MCNC: 88 MG/DL — SIGNIFICANT CHANGE UP (ref 70–99)
GLUCOSE UR QL: NEGATIVE — SIGNIFICANT CHANGE UP
HCT VFR BLD CALC: 46.8 % — HIGH (ref 34.5–45)
HGB BLD-MCNC: 15.8 G/DL — HIGH (ref 11.5–15.5)
INR BLD: 1.12 RATIO — SIGNIFICANT CHANGE UP (ref 0.88–1.16)
KETONES UR-MCNC: ABNORMAL
LACTATE SERPL-SCNC: 0.7 MMOL/L — SIGNIFICANT CHANGE UP (ref 0.7–2)
LACTATE SERPL-SCNC: 2.3 MMOL/L — HIGH (ref 0.7–2)
LEUKOCYTE ESTERASE UR-ACNC: ABNORMAL
LYMPHOCYTES # BLD AUTO: 20 % — SIGNIFICANT CHANGE UP (ref 13–44)
LYMPHOCYTES # BLD AUTO: 4.1 K/UL — HIGH (ref 1–3.3)
MAGNESIUM SERPL-MCNC: 2.2 MG/DL — SIGNIFICANT CHANGE UP (ref 1.6–2.6)
MANUAL SMEAR VERIFICATION: SIGNIFICANT CHANGE UP
MCHC RBC-ENTMCNC: 33.8 GM/DL — SIGNIFICANT CHANGE UP (ref 32–36)
MCHC RBC-ENTMCNC: 33.8 PG — SIGNIFICANT CHANGE UP (ref 27–34)
MCV RBC AUTO: 100.2 FL — HIGH (ref 80–100)
MONOCYTES # BLD AUTO: 3.89 K/UL — HIGH (ref 0–0.9)
MONOCYTES NFR BLD AUTO: 19 % — HIGH (ref 2–14)
NEUTROPHILS # BLD AUTO: 11.47 K/UL — HIGH (ref 1.8–7.4)
NEUTROPHILS NFR BLD AUTO: 26 % — LOW (ref 43–77)
NEUTS BAND # BLD: 30 % — HIGH (ref 0–8)
NITRITE UR-MCNC: POSITIVE
NRBC # BLD: 0 — SIGNIFICANT CHANGE UP
NRBC # BLD: SIGNIFICANT CHANGE UP /100 WBCS (ref 0–0)
NT-PROBNP SERPL-SCNC: 1136 PG/ML — HIGH (ref 0–125)
PH UR: 5 — SIGNIFICANT CHANGE UP (ref 5–8)
PLAT MORPH BLD: NORMAL — SIGNIFICANT CHANGE UP
PLATELET # BLD AUTO: 185 K/UL — SIGNIFICANT CHANGE UP (ref 150–400)
POTASSIUM SERPL-MCNC: 3.9 MMOL/L — SIGNIFICANT CHANGE UP (ref 3.5–5.3)
POTASSIUM SERPL-SCNC: 3.9 MMOL/L — SIGNIFICANT CHANGE UP (ref 3.5–5.3)
PROT SERPL-MCNC: 8 G/DL — SIGNIFICANT CHANGE UP (ref 6–8.3)
PROT UR-MCNC: 75 MG/DL
PROTHROM AB SERPL-ACNC: 12.7 SEC — SIGNIFICANT CHANGE UP (ref 10–12.9)
RBC # BLD: 4.67 M/UL — SIGNIFICANT CHANGE UP (ref 3.8–5.2)
RBC # FLD: 15.9 % — HIGH (ref 10.3–14.5)
RBC BLD AUTO: NORMAL — SIGNIFICANT CHANGE UP
RBC CASTS # UR COMP ASSIST: >50 /HPF (ref 0–4)
RSV RESULT: SIGNIFICANT CHANGE UP
RSV RNA RESP QL NAA+PROBE: SIGNIFICANT CHANGE UP
SODIUM SERPL-SCNC: 142 MMOL/L — SIGNIFICANT CHANGE UP (ref 135–145)
SP GR SPEC: 1.02 — SIGNIFICANT CHANGE UP (ref 1.01–1.02)
TROPONIN I SERPL-MCNC: <.015 NG/ML — SIGNIFICANT CHANGE UP (ref 0.01–0.04)
TSH SERPL-MCNC: 2.33 UIU/ML — SIGNIFICANT CHANGE UP (ref 0.36–3.74)
UROBILINOGEN FLD QL: 4
VARIANT LYMPHS # BLD: 5 % — SIGNIFICANT CHANGE UP (ref 0–6)
WBC # BLD: 20.48 K/UL — HIGH (ref 3.8–10.5)
WBC # FLD AUTO: 20.48 K/UL — HIGH (ref 3.8–10.5)
WBC UR QL: SIGNIFICANT CHANGE UP

## 2019-12-02 PROCEDURE — 99285 EMERGENCY DEPT VISIT HI MDM: CPT

## 2019-12-02 PROCEDURE — 71250 CT THORAX DX C-: CPT | Mod: 26

## 2019-12-02 PROCEDURE — 93010 ELECTROCARDIOGRAM REPORT: CPT

## 2019-12-02 PROCEDURE — 71045 X-RAY EXAM CHEST 1 VIEW: CPT | Mod: 26

## 2019-12-02 RX ORDER — FUROSEMIDE 40 MG
20 TABLET ORAL DAILY
Refills: 0 | Status: DISCONTINUED | OUTPATIENT
Start: 2019-12-03 | End: 2019-12-09

## 2019-12-02 RX ORDER — DEXTROSE 50 % IN WATER 50 %
15 SYRINGE (ML) INTRAVENOUS ONCE
Refills: 0 | Status: DISCONTINUED | OUTPATIENT
Start: 2019-12-02 | End: 2019-12-09

## 2019-12-02 RX ORDER — ENOXAPARIN SODIUM 100 MG/ML
40 INJECTION SUBCUTANEOUS DAILY
Refills: 0 | Status: DISCONTINUED | OUTPATIENT
Start: 2019-12-02 | End: 2019-12-09

## 2019-12-02 RX ORDER — BUDESONIDE, MICRONIZED 100 %
0.5 POWDER (GRAM) MISCELLANEOUS
Refills: 0 | Status: DISCONTINUED | OUTPATIENT
Start: 2019-12-02 | End: 2019-12-09

## 2019-12-02 RX ORDER — MONTELUKAST 4 MG/1
10 TABLET, CHEWABLE ORAL AT BEDTIME
Refills: 0 | Status: DISCONTINUED | OUTPATIENT
Start: 2019-12-02 | End: 2019-12-09

## 2019-12-02 RX ORDER — DIVALPROEX SODIUM 500 MG/1
375 TABLET, DELAYED RELEASE ORAL DAILY
Refills: 0 | Status: DISCONTINUED | OUTPATIENT
Start: 2019-12-02 | End: 2019-12-09

## 2019-12-02 RX ORDER — ACETAMINOPHEN 500 MG
650 TABLET ORAL ONCE
Refills: 0 | Status: COMPLETED | OUTPATIENT
Start: 2019-12-02 | End: 2019-12-02

## 2019-12-02 RX ORDER — LATANOPROST 0.05 MG/ML
1 SOLUTION/ DROPS OPHTHALMIC; TOPICAL AT BEDTIME
Refills: 0 | Status: DISCONTINUED | OUTPATIENT
Start: 2019-12-02 | End: 2019-12-09

## 2019-12-02 RX ORDER — DEXTROSE 50 % IN WATER 50 %
12.5 SYRINGE (ML) INTRAVENOUS ONCE
Refills: 0 | Status: DISCONTINUED | OUTPATIENT
Start: 2019-12-02 | End: 2019-12-09

## 2019-12-02 RX ORDER — SODIUM CHLORIDE 9 MG/ML
1000 INJECTION INTRAMUSCULAR; INTRAVENOUS; SUBCUTANEOUS ONCE
Refills: 0 | Status: COMPLETED | OUTPATIENT
Start: 2019-12-02 | End: 2019-12-02

## 2019-12-02 RX ORDER — IPRATROPIUM/ALBUTEROL SULFATE 18-103MCG
3 AEROSOL WITH ADAPTER (GRAM) INHALATION EVERY 6 HOURS
Refills: 0 | Status: DISCONTINUED | OUTPATIENT
Start: 2019-12-02 | End: 2019-12-09

## 2019-12-02 RX ORDER — DEXTROSE 50 % IN WATER 50 %
25 SYRINGE (ML) INTRAVENOUS ONCE
Refills: 0 | Status: DISCONTINUED | OUTPATIENT
Start: 2019-12-02 | End: 2019-12-09

## 2019-12-02 RX ORDER — PIPERACILLIN AND TAZOBACTAM 4; .5 G/20ML; G/20ML
3.38 INJECTION, POWDER, LYOPHILIZED, FOR SOLUTION INTRAVENOUS EVERY 8 HOURS
Refills: 0 | Status: DISCONTINUED | OUTPATIENT
Start: 2019-12-02 | End: 2019-12-03

## 2019-12-02 RX ORDER — PIPERACILLIN AND TAZOBACTAM 4; .5 G/20ML; G/20ML
3.38 INJECTION, POWDER, LYOPHILIZED, FOR SOLUTION INTRAVENOUS ONCE
Refills: 0 | Status: COMPLETED | OUTPATIENT
Start: 2019-12-02 | End: 2019-12-02

## 2019-12-02 RX ORDER — IPRATROPIUM/ALBUTEROL SULFATE 18-103MCG
3 AEROSOL WITH ADAPTER (GRAM) INHALATION ONCE
Refills: 0 | Status: COMPLETED | OUTPATIENT
Start: 2019-12-02 | End: 2019-12-02

## 2019-12-02 RX ORDER — GLUCAGON INJECTION, SOLUTION 0.5 MG/.1ML
1 INJECTION, SOLUTION SUBCUTANEOUS ONCE
Refills: 0 | Status: DISCONTINUED | OUTPATIENT
Start: 2019-12-02 | End: 2019-12-08

## 2019-12-02 RX ORDER — DILTIAZEM HCL 120 MG
300 CAPSULE, EXT RELEASE 24 HR ORAL DAILY
Refills: 0 | Status: DISCONTINUED | OUTPATIENT
Start: 2019-12-02 | End: 2019-12-09

## 2019-12-02 RX ORDER — POTASSIUM CHLORIDE 20 MEQ
10 PACKET (EA) ORAL DAILY
Refills: 0 | Status: DISCONTINUED | OUTPATIENT
Start: 2019-12-03 | End: 2019-12-09

## 2019-12-02 RX ORDER — SODIUM CHLORIDE 9 MG/ML
1000 INJECTION INTRAMUSCULAR; INTRAVENOUS; SUBCUTANEOUS
Refills: 0 | Status: DISCONTINUED | OUTPATIENT
Start: 2019-12-02 | End: 2019-12-03

## 2019-12-02 RX ORDER — HYDROCORTISONE 20 MG
40 TABLET ORAL DAILY
Refills: 0 | Status: COMPLETED | OUTPATIENT
Start: 2019-12-02 | End: 2019-12-05

## 2019-12-02 RX ORDER — DIVALPROEX SODIUM 500 MG/1
125 TABLET, DELAYED RELEASE ORAL DAILY
Refills: 0 | Status: DISCONTINUED | OUTPATIENT
Start: 2019-12-02 | End: 2019-12-09

## 2019-12-02 RX ORDER — SODIUM CHLORIDE 9 MG/ML
1000 INJECTION, SOLUTION INTRAVENOUS
Refills: 0 | Status: DISCONTINUED | OUTPATIENT
Start: 2019-12-02 | End: 2019-12-09

## 2019-12-02 RX ADMIN — Medication 650 MILLIGRAM(S): at 09:07

## 2019-12-02 RX ADMIN — Medication 40 MILLIGRAM(S): at 15:58

## 2019-12-02 RX ADMIN — SODIUM CHLORIDE 1000 MILLILITER(S): 9 INJECTION INTRAMUSCULAR; INTRAVENOUS; SUBCUTANEOUS at 10:19

## 2019-12-02 RX ADMIN — Medication 0.5 MILLIGRAM(S): at 19:00

## 2019-12-02 RX ADMIN — MONTELUKAST 10 MILLIGRAM(S): 4 TABLET, CHEWABLE ORAL at 21:36

## 2019-12-02 RX ADMIN — PIPERACILLIN AND TAZOBACTAM 200 GRAM(S): 4; .5 INJECTION, POWDER, LYOPHILIZED, FOR SOLUTION INTRAVENOUS at 18:25

## 2019-12-02 RX ADMIN — SODIUM CHLORIDE 80 MILLILITER(S): 9 INJECTION INTRAMUSCULAR; INTRAVENOUS; SUBCUTANEOUS at 15:59

## 2019-12-02 RX ADMIN — Medication 3 MILLILITER(S): at 08:58

## 2019-12-02 RX ADMIN — PIPERACILLIN AND TAZOBACTAM 3.38 GRAM(S): 4; .5 INJECTION, POWDER, LYOPHILIZED, FOR SOLUTION INTRAVENOUS at 10:20

## 2019-12-02 RX ADMIN — Medication 3 MILLILITER(S): at 19:00

## 2019-12-02 RX ADMIN — Medication 650 MILLIGRAM(S): at 10:09

## 2019-12-02 RX ADMIN — SODIUM CHLORIDE 1000 MILLILITER(S): 9 INJECTION INTRAMUSCULAR; INTRAVENOUS; SUBCUTANEOUS at 09:06

## 2019-12-02 RX ADMIN — SODIUM CHLORIDE 1000 MILLILITER(S): 9 INJECTION INTRAMUSCULAR; INTRAVENOUS; SUBCUTANEOUS at 11:36

## 2019-12-02 RX ADMIN — LATANOPROST 1 DROP(S): 0.05 SOLUTION/ DROPS OPHTHALMIC; TOPICAL at 21:36

## 2019-12-02 RX ADMIN — SODIUM CHLORIDE 1000 MILLILITER(S): 9 INJECTION INTRAMUSCULAR; INTRAVENOUS; SUBCUTANEOUS at 10:20

## 2019-12-02 RX ADMIN — SODIUM CHLORIDE 80 MILLILITER(S): 9 INJECTION INTRAMUSCULAR; INTRAVENOUS; SUBCUTANEOUS at 18:24

## 2019-12-02 RX ADMIN — PIPERACILLIN AND TAZOBACTAM 200 GRAM(S): 4; .5 INJECTION, POWDER, LYOPHILIZED, FOR SOLUTION INTRAVENOUS at 09:06

## 2019-12-02 NOTE — H&P ADULT - NSICDXPASTMEDICALHX_GEN_ALL_CORE_FT
PAST MEDICAL HISTORY:  CP (cerebral palsy)     Epilepsy     Mentally challenged     Osteoporosis     Scoliosis

## 2019-12-02 NOTE — H&P ADULT - NSHPPHYSICALEXAM_GEN_ALL_CORE
aid at bedside aid at bedside  patient opened her eyes when she saw me  lifted her head  looking around  temp 99.1 116/71 pulse ox room air 91 percent  lungs bl wheez  hrt tachy  abd soft  ext no c/c/e  moves upper ext as always

## 2019-12-02 NOTE — H&P ADULT - ATTENDING COMMENTS
I spoke to aid from the home at length by bedside  I gave her my cell if mom and dad should want to call me

## 2019-12-02 NOTE — H&P ADULT - PROBLEM SELECTOR PLAN 1
sepsis due to aspiration pn  speech eval  iv antabiotics  iv fluids  cultures sent  continue homemeds  no cardiac consequence  hr nsr not irregular  flu rsv negative  send rvp

## 2019-12-02 NOTE — ED ADULT NURSE NOTE - OBJECTIVE STATEMENT
pt to er by ambulance sent for evaluation sob has cough with rhonchi no sputum noted resp unlabored while on stretcher is agitated at times called out speech incoherent iv started 22 angio left arm labs drawn aide at bedside with pt

## 2019-12-02 NOTE — H&P ADULT - NSHPREVIEWOFSYSTEMS_GEN_ALL_CORE
profound me  cp none ambulatory  sp paf resolved  asthma  peripheral edema  chronic seizures profound mental retardation  cp none ambulatory  sp paf resolved  asthma  peripheral edema  chronic seizures  severe kypho scoliosis  functional quadraplegia

## 2019-12-02 NOTE — ED PROVIDER NOTE - OBJECTIVE STATEMENT
59 female presents to ER from group home with report of cough, difficulty breathing, r/o pneumonia. Patient has h/o MR, provides no history, details obtained from chart.

## 2019-12-02 NOTE — ED ADULT NURSE NOTE - NSIMPLEMENTINTERV_GEN_ALL_ED
Implemented All Fall with Harm Risk Interventions:  [place] to call system. Call bell, personal items and telephone within reach. Instruct patient to call for assistance. Room bathroom lighting operational. Non-slip footwear when patient is off stretcher. Physically safe environment: no spills, clutter or unnecessary equipment. Stretcher in lowest position, wheels locked, appropriate side rails in place. Provide visual cue, wrist band, yellow gown, etc. Monitor gait and stability. Monitor for mental status changes and reorient to person, place, and time. Review medications for side effects contributing to fall risk. Reinforce activity limits and safety measures with patient and family. Provide visual clues: red socks.

## 2019-12-02 NOTE — PATIENT PROFILE ADULT - HAS THE PATIENT USED TOBACCO IN THE PAST 30 DAYS?
Vital Signs Last 24 Hrs  T(C): 36.4 (09 Jun 2019 20:51), Max: 36.8 (09 Jun 2019 11:24)  T(F): 97.6 (09 Jun 2019 20:51), Max: 98.2 (09 Jun 2019 11:24)  HR: 75 (09 Jun 2019 20:51) (75 - 76)  BP: 122/79 (09 Jun 2019 20:51) (111/80 - 122/79)  BP(mean): --  RR: 20 (09 Jun 2019 20:51) (20 - 20)  SpO2: 100% (09 Jun 2019 20:51) (98% - 100%)
Unable to assess due to patient's cognitive impairment

## 2019-12-02 NOTE — PATIENT PROFILE ADULT - BILL PAYMENT
Assessment /Plan     For exam results, see Encounter Report.    Myopia of both eyes            1.  See note with same date.                 
no

## 2019-12-02 NOTE — ED PROVIDER NOTE - CLINICAL SUMMARY MEDICAL DECISION MAKING FREE TEXT BOX
cough, fever, difficulty breathing, f/u ekg, cardiac monitor, labs, cultures, iv fluids, duoneb, abx

## 2019-12-03 DIAGNOSIS — B34.8 OTHER VIRAL INFECTIONS OF UNSPECIFIED SITE: ICD-10-CM

## 2019-12-03 LAB
ALBUMIN SERPL ELPH-MCNC: 2.7 G/DL — LOW (ref 3.3–5)
ALP SERPL-CCNC: 60 U/L — SIGNIFICANT CHANGE UP (ref 40–120)
ALT FLD-CCNC: 21 U/L — SIGNIFICANT CHANGE UP (ref 12–78)
ANION GAP SERPL CALC-SCNC: 4 MMOL/L — LOW (ref 5–17)
AST SERPL-CCNC: 28 U/L — SIGNIFICANT CHANGE UP (ref 15–37)
BASOPHILS # BLD AUTO: 0.03 K/UL — SIGNIFICANT CHANGE UP (ref 0–0.2)
BASOPHILS NFR BLD AUTO: 0.2 % — SIGNIFICANT CHANGE UP (ref 0–2)
BILIRUB SERPL-MCNC: 0.4 MG/DL — SIGNIFICANT CHANGE UP (ref 0.2–1.2)
BUN SERPL-MCNC: 12 MG/DL — SIGNIFICANT CHANGE UP (ref 7–23)
CALCIUM SERPL-MCNC: 8.3 MG/DL — LOW (ref 8.5–10.1)
CHLORIDE SERPL-SCNC: 111 MMOL/L — HIGH (ref 96–108)
CO2 SERPL-SCNC: 31 MMOL/L — SIGNIFICANT CHANGE UP (ref 22–31)
CREAT SERPL-MCNC: 0.53 MG/DL — SIGNIFICANT CHANGE UP (ref 0.5–1.3)
CULTURE RESULTS: NO GROWTH — SIGNIFICANT CHANGE UP
EOSINOPHIL # BLD AUTO: 0 K/UL — SIGNIFICANT CHANGE UP (ref 0–0.5)
EOSINOPHIL NFR BLD AUTO: 0 % — SIGNIFICANT CHANGE UP (ref 0–6)
GLUCOSE SERPL-MCNC: 92 MG/DL — SIGNIFICANT CHANGE UP (ref 70–99)
HBA1C BLD-MCNC: 5.2 % — SIGNIFICANT CHANGE UP (ref 4–5.6)
HCT VFR BLD CALC: 39.6 % — SIGNIFICANT CHANGE UP (ref 34.5–45)
HCV AB S/CO SERPL IA: 0.26 S/CO — SIGNIFICANT CHANGE UP (ref 0–0.99)
HCV AB SERPL-IMP: SIGNIFICANT CHANGE UP
HGB BLD-MCNC: 13.1 G/DL — SIGNIFICANT CHANGE UP (ref 11.5–15.5)
HPIV1 RNA SPEC QL NAA+PROBE: DETECTED
IMM GRANULOCYTES NFR BLD AUTO: 0.6 % — SIGNIFICANT CHANGE UP (ref 0–1.5)
LYMPHOCYTES # BLD AUTO: 30.1 % — SIGNIFICANT CHANGE UP (ref 13–44)
LYMPHOCYTES # BLD AUTO: 4.52 K/UL — HIGH (ref 1–3.3)
MCHC RBC-ENTMCNC: 33.1 GM/DL — SIGNIFICANT CHANGE UP (ref 32–36)
MCHC RBC-ENTMCNC: 33.3 PG — SIGNIFICANT CHANGE UP (ref 27–34)
MCV RBC AUTO: 100.8 FL — HIGH (ref 80–100)
MONOCYTES # BLD AUTO: 1.56 K/UL — HIGH (ref 0–0.9)
MONOCYTES NFR BLD AUTO: 10.4 % — SIGNIFICANT CHANGE UP (ref 2–14)
NEUTROPHILS # BLD AUTO: 8.81 K/UL — HIGH (ref 1.8–7.4)
NEUTROPHILS NFR BLD AUTO: 58.7 % — SIGNIFICANT CHANGE UP (ref 43–77)
NRBC # BLD: 0 /100 WBCS — SIGNIFICANT CHANGE UP (ref 0–0)
PLATELET # BLD AUTO: 159 K/UL — SIGNIFICANT CHANGE UP (ref 150–400)
POTASSIUM SERPL-MCNC: 4 MMOL/L — SIGNIFICANT CHANGE UP (ref 3.5–5.3)
POTASSIUM SERPL-SCNC: 4 MMOL/L — SIGNIFICANT CHANGE UP (ref 3.5–5.3)
PROCALCITONIN SERPL-MCNC: 0.11 NG/ML — HIGH (ref 0–0.04)
PROT SERPL-MCNC: 6.5 G/DL — SIGNIFICANT CHANGE UP (ref 6–8.3)
RAPID RVP RESULT: DETECTED
RBC # BLD: 3.93 M/UL — SIGNIFICANT CHANGE UP (ref 3.8–5.2)
RBC # FLD: 15.6 % — HIGH (ref 10.3–14.5)
SODIUM SERPL-SCNC: 146 MMOL/L — HIGH (ref 135–145)
SPECIMEN SOURCE: SIGNIFICANT CHANGE UP
TROPONIN I SERPL-MCNC: <.015 NG/ML — SIGNIFICANT CHANGE UP (ref 0.01–0.04)
VALPROATE SERPL-MCNC: 29 UG/ML — LOW (ref 50–100)
VIT B12 SERPL-MCNC: 1446 PG/ML — HIGH (ref 232–1245)
WBC # BLD: 15.01 K/UL — HIGH (ref 3.8–10.5)
WBC # FLD AUTO: 15.01 K/UL — HIGH (ref 3.8–10.5)

## 2019-12-03 RX ORDER — DIVALPROEX SODIUM 500 MG/1
500 TABLET, DELAYED RELEASE ORAL ONCE
Refills: 0 | Status: COMPLETED | OUTPATIENT
Start: 2019-12-03 | End: 2019-12-03

## 2019-12-03 RX ORDER — SENNA PLUS 8.6 MG/1
2 TABLET ORAL AT BEDTIME
Refills: 0 | Status: DISCONTINUED | OUTPATIENT
Start: 2019-12-03 | End: 2019-12-09

## 2019-12-03 RX ORDER — SODIUM CHLORIDE 9 MG/ML
1000 INJECTION, SOLUTION INTRAVENOUS
Refills: 0 | Status: DISCONTINUED | OUTPATIENT
Start: 2019-12-03 | End: 2019-12-04

## 2019-12-03 RX ORDER — LACTULOSE 10 G/15ML
10 SOLUTION ORAL THREE TIMES A DAY
Refills: 0 | Status: DISCONTINUED | OUTPATIENT
Start: 2019-12-03 | End: 2019-12-09

## 2019-12-03 RX ADMIN — DIVALPROEX SODIUM 500 MILLIGRAM(S): 500 TABLET, DELAYED RELEASE ORAL at 21:09

## 2019-12-03 RX ADMIN — Medication 40 MILLIGRAM(S): at 05:22

## 2019-12-03 RX ADMIN — Medication 3 MILLILITER(S): at 14:20

## 2019-12-03 RX ADMIN — SODIUM CHLORIDE 40 MILLILITER(S): 9 INJECTION, SOLUTION INTRAVENOUS at 21:09

## 2019-12-03 RX ADMIN — Medication 300 MILLIGRAM(S): at 05:22

## 2019-12-03 RX ADMIN — LACTULOSE 10 GRAM(S): 10 SOLUTION ORAL at 21:10

## 2019-12-03 RX ADMIN — PIPERACILLIN AND TAZOBACTAM 25 GRAM(S): 4; .5 INJECTION, POWDER, LYOPHILIZED, FOR SOLUTION INTRAVENOUS at 02:17

## 2019-12-03 RX ADMIN — Medication 0.5 MILLIGRAM(S): at 19:50

## 2019-12-03 RX ADMIN — Medication 10 MILLIEQUIVALENT(S): at 12:38

## 2019-12-03 RX ADMIN — SENNA PLUS 2 TABLET(S): 8.6 TABLET ORAL at 21:10

## 2019-12-03 RX ADMIN — DIVALPROEX SODIUM 125 MILLIGRAM(S): 500 TABLET, DELAYED RELEASE ORAL at 12:38

## 2019-12-03 RX ADMIN — LATANOPROST 1 DROP(S): 0.05 SOLUTION/ DROPS OPHTHALMIC; TOPICAL at 21:11

## 2019-12-03 RX ADMIN — Medication 20 MILLIGRAM(S): at 05:22

## 2019-12-03 RX ADMIN — ENOXAPARIN SODIUM 40 MILLIGRAM(S): 100 INJECTION SUBCUTANEOUS at 12:38

## 2019-12-03 RX ADMIN — Medication 3 MILLILITER(S): at 19:50

## 2019-12-03 RX ADMIN — SODIUM CHLORIDE 40 MILLILITER(S): 9 INJECTION, SOLUTION INTRAVENOUS at 12:39

## 2019-12-03 RX ADMIN — MONTELUKAST 10 MILLIGRAM(S): 4 TABLET, CHEWABLE ORAL at 21:10

## 2019-12-03 RX ADMIN — DIVALPROEX SODIUM 375 MILLIGRAM(S): 500 TABLET, DELAYED RELEASE ORAL at 12:38

## 2019-12-03 NOTE — DIETITIAN INITIAL EVALUATION ADULT. - ADD RECOMMEND
Current diet remains appropriate at this time. Encourage po intake of nutrient dense meals/snacks. Maintain aspiration precautions.

## 2019-12-03 NOTE — DIETITIAN INITIAL EVALUATION ADULT. - OTHER INFO
59 year old female with PMH of MR, cerebral palsy (non ambulatory), edema, seizures, quadriplegia admitted from group home for wheezing, coughing. +RVP.    Pt alert, nonverbal. On isolation precautions with no family/aide at bedside presently. Subjective information obtained from chart review, staff. Attempted to call Ezra Caballero but unable to reach at this time. Takes multiple vitamin/mineral supplements prior to admission: Vitamin C, D, benefiber, Multivitamin, ferrous sulfate, folic acid. With h/o dysphagia, per H&P Speech consult to be ordered.   Unable to determine diet texture/fluid consistency at group home or any information regarding pt's po intake/appetite PTA. On pureed with honey thickened liquids secondary to suspected aspiration PNA.    Per RN, pt consumed lunch ~50% of meal tray which is consistent with flowsheet documentation. No BM yet per RN.

## 2019-12-03 NOTE — PROGRESS NOTE ADULT - SUBJECTIVE AND OBJECTIVE BOX
PAST MEDICAL & SURGICAL HISTORY:  Osteoporosis  CP (cerebral palsy)  Scoliosis  Mentally challenged  Epilepsy      MEDICATIONS  (STANDING):  albuterol/ipratropium for Nebulization 3 milliLiter(s) Nebulizer every 6 hours  buDESOnide    Inhalation Suspension 0.5 milliGRAM(s) Inhalation two times a day  dextrose 5%. 1000 milliLiter(s) (50 mL/Hr) IV Continuous <Continuous>  dextrose 5%. 1000 milliLiter(s) (40 mL/Hr) IV Continuous <Continuous>  dextrose 50% Injectable 12.5 Gram(s) IV Push once  dextrose 50% Injectable 25 Gram(s) IV Push once  dextrose 50% Injectable 25 Gram(s) IV Push once  diltiazem    milliGRAM(s) Oral daily  diVALproex Sprinkle 375 milliGRAM(s) Oral daily  diVALproex Sprinkle 125 milliGRAM(s) Oral daily  enoxaparin Injectable 40 milliGRAM(s) SubCutaneous daily  furosemide    Tablet 20 milliGRAM(s) Oral daily  hydrocortisone sodium succinate Injectable 40 milliGRAM(s) IV Push daily  latanoprost 0.005% Ophthalmic Solution 1 Drop(s) Both EYES at bedtime  montelukast 10 milliGRAM(s) Oral at bedtime  potassium chloride    Tablet ER 10 milliEquivalent(s) Oral daily    MEDICATIONS  (PRN):  dextrose 40% Gel 15 Gram(s) Oral once PRN Blood Glucose LESS THAN 70 milliGRAM(s)/deciliter  glucagon  Injectable 1 milliGRAM(s) IntraMuscular once PRN Glucose LESS THAN 70 milligrams/deciliter      Patient is a 59y old  Female who presents with a chief complaint of wheezing  coughing (03 Dec 2019 15:43)      Vital Signs Last 24 Hrs  T(C): 37 (03 Dec 2019 14:30), Max: 37.7 (02 Dec 2019 23:38)  T(F): 98.6 (03 Dec 2019 14:30), Max: 99.9 (02 Dec 2019 23:38)  HR: 90 (03 Dec 2019 14:30) (84 - 102)  BP: 102/63 (03 Dec 2019 14:30) (102/63 - 117/77)  BP(mean): --  RR: 18 (03 Dec 2019 14:30) (17 - 18)  SpO2: 92% (03 Dec 2019 14:30) (92% - 94%)           @ 07: @ 07:00  --------------------------------------------------------  IN: 980 mL / OUT: 650 mL / NET: 330 mL     @ 07: @ 19:50  --------------------------------------------------------  IN: 100 mL / OUT: 600 mL / NET: -500 mL        REVIEW OF SYSTEMS:    Constitutional: No fever, weight loss or fatigue  Eyes: No eye pain, visual disturbances, or discharge  ENT:  No difficulty hearing, tinnitus, vertigo; No sinus or throat pain  Neck: No pain or stiffness  Breasts: No pain, masses or nipple discharge  Respiratory: No cough, wheezing, chills or hemoptysis  Cardiovascular: No chest pain, palpitations, shortness of breath, dizziness or leg swelling  Gastrointestinal: No abdominal or epigastric pain. No nausea, vomiting or hematemesis; No diarrhea or constipation. No melena or hematochezia.  Genitourinary: No dysuria, frequency, hematuria or incontinence  Rectal: No pain, hemorrhoids or incontinence  Neurological: No headaches, memory loss, loss of strength, numbness or tremors  Skin: No itching, burning, rashes or lesions   Lymph Nodes: No enlarged glands  Endocrine: No heat or cold intolerance; No hair loss  Musculoskeletal: No joint pain or swelling; No muscle, back or extremity pain  Psychiatric: No depression, anxiety, mood swings or difficulty sleeping  Heme/Lymph: No easy bruising or bleeding gums  Allergy and Immunologic: No hives or eczema    PHYSICAL EXAM:    Constitutional: NAD, well-groomed, well-developed  HEENT: PERRLA, EOMI, Normal Hearing, MMM  Neck: No LAD, No JVD  Back: Normal spine flexure, No CVA tenderness  Respiratory: CTAB/L   Cardiovascular: S1 and S2, RRR, no M/G/R  Gastrointestinal: BS+, soft, NT/ND  Extremities: No peripheral edema  Vascular: 2+ peripheral pulses  Neurological: A/O x 3, no focal deficits  Skin: No rashes      decubiti:                           13.1   15.01 )-----------( 159      ( 03 Dec 2019 06:38 )             39.6         146<H>  |  111<H>  |  12  ----------------------------<  92  4.0   |  31  |  0.53    Ca    8.3<L>      03 Dec 2019 06:38  Mg     2.2         TPro  6.5  /  Alb  2.7<L>  /  TBili  0.4  /  DBili  x   /  AST  28  /  ALT  21  /  AlkPhos  60      PT/INR - ( 02 Dec 2019 08:19 )   PT: 12.7 sec;   INR: 1.12 ratio         PTT - ( 02 Dec 2019 08:19 )  PTT:34.7 sec  Urinalysis Basic - ( 02 Dec 2019 09:02 )    Color: Brown / Appearance: Turbid / S.020 / pH: x  Gluc: x / Ketone: Small  / Bili: Negative / Urobili: 4   Blood: x / Protein: 75 mg/dL / Nitrite: Positive   Leuk Esterase: Small / RBC: >50 /HPF / WBC 3-5   Sq Epi: x / Non Sq Epi: Occasional / Bacteria: Moderate      CARDIAC MARKERS ( 03 Dec 2019 06:38 )  <.015 ng/mL / x     / x     / x     / x      CARDIAC MARKERS ( 02 Dec 2019 08:19 )  <.015 ng/mL / x     / x     / x     / 1.9 ng/mL       @ 09:19    -  B12:  1446       TSH: ----  Troponin:  <.015 --  Valproic acid level:  29  --    .Urine Catheterized   @ 11:02   No growth  --  --      .Blood Blood-Peripheral   @ 11:00   No growth to date.  --  --        Urine Culture:   @ 11:02    --        No growth  Urine Culture:   @ 11:00    --        No growth to date.        Radiology:  < from: CT Chest No Cont (19 @ 09:36) >  EXAM:  CT CHEST                            PROCEDURE DATE:  2019          INTERPRETATION:  CLINICAL INFORMATION: Difficulty breathing, fever    COMPARISON: None.    PROCEDURE:   CT of the Chest was performed without intravenous contrast.  Sagittal and coronal reformats were performed.      FINDINGS:    LUNGS AND AIRWAYS: Patent central airways.  Low lung volumes. Left   perihilar and lower lobe consolidation likely reflecting a combination of   pneumonia and atelectasis. Right perihilar groundglass infiltrate   consistent with pneumonia    PLEURA: Trace left pleural effusion    MEDIASTINUM AND TORRES: No lymphadenopathy. Lack of IV contrast limits   hilar evaluation.    VESSELS: Nonaneurysmal.    HEART: Heart size is normal. No pericardial effusion.    CHEST WALL AND LOWER NECK: Within normal limits.    VISUALIZED UPPER ABDOMEN: Bilateral nonobstructive nephrolithiasis.   Functional distention of the colon    BONES: Profound scoliosis. Nonspecific sclerotic focus involving a   midthoracic vertebral body.    IMPRESSION:     Bilateral pneumonia as described. Please image to resolution.  Additional findings as discussed                      < end of copied text >

## 2019-12-03 NOTE — CONSULT NOTE ADULT - ASSESSMENT
PULMONARY/CRITICAL CARE ASSESSMENT/RECOMMENDATIONS                      RESP TRACT INFECTION   12/3/2019 W 20-15   12/3/2019 PC .11   12/2-12/3 LA 2.3-.7   12/2 blod culture n   12/2 urine culture n   12/2 Flu AB n RSV n  12/2/2019 RVP parainfluenza 1 detected   12/2/2019 CXR r lung clear elevcated l diaphragm l lung clear s scoliosis   12/3/2019 CT ch l perihilar and lower lobe consolidation R perihilar gg infiltrate   ARNOL 12/3/2019 As w is decreasing and as pc is .11 and as cultures are neg agree with deferring abio If she gets worse will start on zosyn for bact superinfection     COPD  duoneb.4 (12/2) pulmicort (12/2) hydrocort 40.3d (12/2) montelukast 10 (12/2) Cont rx   A fib 12/2 ekg a f Cardizem cd 300 (12/2) On rate control rx   CHF Lasix 20 (12/3) KCL 10 (12/3) Compensated   NEURO PSYCH depakote 375 (12/2) plus depakote 125 (12/2) Cont                PLAN  Parainfluenza 3 resp tract on supp Rx     TIME SPENT Over 55 minutes aggregate care time spent on encounter; activities included   direct pt care, counseling and/or coordinating care reviewing notes, lab data/ imaging , discussion with multidisciplinary team/ pt /family. Risks, benefits, alternatives  discussed in detail.

## 2019-12-03 NOTE — DIETITIAN INITIAL EVALUATION ADULT. - PHYSICAL APPEARANCE
well nourished/BMI 24.2 kg/m2 using admission wt and height; pt appears thin but well nourished with likely diminished muscle tone c/w CP

## 2019-12-03 NOTE — CONSULT NOTE ADULT - SUBJECTIVE AND OBJECTIVE BOX
JAYA BEJARANO [facility] P [phone]  1960 DOA 2019 DR MODESTA ESCAMILLA   ALLERGY      colace phenobarb   CONTACT      mother Ada NEGRETE [phone]      Initial evaluation/Pulmonary Critical Care consultation requested on 12/3/2019   by Dr Escamilla    from Dr Trevizo   Patient examined chart reviewed    HOSPITAL ADMISSION   PATIENT CAME  FROM (if information available)        REVIEW OF SYMPTOMS      Able to give ROS  NO     PHYSICAL EXAM    HEENT Unremarkable PERRLA atraumatic   RESP Fair air entry EXP prolonged    Harsh breath sound Resp distres mild   CARDIAC S1 S2 No S3     NO JVD    ABDOMEN SOFT BS PRESENT NOT DISTENDED No hepatosplenomegaly PEDAL EDEMA present No calf tenderness  NO rash   GENERAL Not TOXIC looking    VITALS/LABS       12/3/2019 afeb 102/60 18 92%   12/3/2019 W 15 Hb 13 Plt 159  na 146 K 4 CO2 31 Cr .5   12/3/2019 Tr 1 n    bnp 1136   RESP TRACT INFECTION   12/3/2019 W 20-15   12/3/2019 PC .11   -12/3 LA 2.3-.7    blod culture n    urine culture n    Flu AB n RSV n  2019 RVP parainfluenza 1 detected   2019 CXR r lung clear elevcated l diaphragm l lung clear s scoliosis   12/3/2019 CT ch l perihilar and lower lobe consolidation R perihilar gg infiltrate       JAYA BEJARANO [facility] [phone]  1960 DOA 2019 DR MODESTA ESCAMILLA   ALLERGY      colace phenobarb   CONTACT      mother Ada NEGRETE [phone]       PT DATA/BEST PRACTICE  ADVANCED DIRECTIVE       CODE STATUS: [ ] full code  [ ] DNR  [ ] DNI  [ ] MOLST  Goals of care discussion: [ ] yes   WT      60          BMI    24                                                                                                       HEAD OF BED ELEVATION Yes  DIET  Dysph 1 puree Honey ()   IV F       D5 40 (12/3)    DVT PROPHYLAXIS lvnx 40 ()     PATIENT DESCRIPTION        CC              2019   From [facility] sob ro pneum             ER VS          2019  108/60 90 18 98%   ER MX                              HPI             2019  59 f from group home with cough sob                            PMH   cp cerebral palsy mental retardation epilepsy osteioporosis scolisosis          HOME MEDS     albuterol asa 325 pulmicort cardizem 300 depakote 125.3 feso4 fa lasix 20 levoxyl 25 loratadine 10 montelukast 10 prolia symbicort

## 2019-12-04 DIAGNOSIS — K59.00 CONSTIPATION, UNSPECIFIED: ICD-10-CM

## 2019-12-04 LAB
ALBUMIN SERPL ELPH-MCNC: 2.6 G/DL — LOW (ref 3.3–5)
ALP SERPL-CCNC: 57 U/L — SIGNIFICANT CHANGE UP (ref 40–120)
ALT FLD-CCNC: 21 U/L — SIGNIFICANT CHANGE UP (ref 12–78)
ANION GAP SERPL CALC-SCNC: 1 MMOL/L — LOW (ref 5–17)
AST SERPL-CCNC: 18 U/L — SIGNIFICANT CHANGE UP (ref 15–37)
BASOPHILS # BLD AUTO: 0.03 K/UL — SIGNIFICANT CHANGE UP (ref 0–0.2)
BASOPHILS NFR BLD AUTO: 0.3 % — SIGNIFICANT CHANGE UP (ref 0–2)
BILIRUB SERPL-MCNC: 0.3 MG/DL — SIGNIFICANT CHANGE UP (ref 0.2–1.2)
BUN SERPL-MCNC: 9 MG/DL — SIGNIFICANT CHANGE UP (ref 7–23)
CALCIUM SERPL-MCNC: 8.1 MG/DL — LOW (ref 8.5–10.1)
CHLORIDE SERPL-SCNC: 110 MMOL/L — HIGH (ref 96–108)
CO2 SERPL-SCNC: 34 MMOL/L — HIGH (ref 22–31)
CREAT SERPL-MCNC: 0.57 MG/DL — SIGNIFICANT CHANGE UP (ref 0.5–1.3)
EOSINOPHIL # BLD AUTO: 0.01 K/UL — SIGNIFICANT CHANGE UP (ref 0–0.5)
EOSINOPHIL NFR BLD AUTO: 0.1 % — SIGNIFICANT CHANGE UP (ref 0–6)
GLUCOSE SERPL-MCNC: 112 MG/DL — HIGH (ref 70–99)
HCT VFR BLD CALC: 39.4 % — SIGNIFICANT CHANGE UP (ref 34.5–45)
HGB BLD-MCNC: 13 G/DL — SIGNIFICANT CHANGE UP (ref 11.5–15.5)
IMM GRANULOCYTES NFR BLD AUTO: 0.6 % — SIGNIFICANT CHANGE UP (ref 0–1.5)
LEGIONELLA AG UR QL: NEGATIVE — SIGNIFICANT CHANGE UP
LYMPHOCYTES # BLD AUTO: 3.54 K/UL — HIGH (ref 1–3.3)
LYMPHOCYTES # BLD AUTO: 33.5 % — SIGNIFICANT CHANGE UP (ref 13–44)
MCHC RBC-ENTMCNC: 33 GM/DL — SIGNIFICANT CHANGE UP (ref 32–36)
MCHC RBC-ENTMCNC: 33.3 PG — SIGNIFICANT CHANGE UP (ref 27–34)
MCV RBC AUTO: 101 FL — HIGH (ref 80–100)
MONOCYTES # BLD AUTO: 0.84 K/UL — SIGNIFICANT CHANGE UP (ref 0–0.9)
MONOCYTES NFR BLD AUTO: 8 % — SIGNIFICANT CHANGE UP (ref 2–14)
MRSA PCR RESULT.: SIGNIFICANT CHANGE UP
NEUTROPHILS # BLD AUTO: 6.08 K/UL — SIGNIFICANT CHANGE UP (ref 1.8–7.4)
NEUTROPHILS NFR BLD AUTO: 57.5 % — SIGNIFICANT CHANGE UP (ref 43–77)
NRBC # BLD: 0 /100 WBCS — SIGNIFICANT CHANGE UP (ref 0–0)
PLATELET # BLD AUTO: 179 K/UL — SIGNIFICANT CHANGE UP (ref 150–400)
POTASSIUM SERPL-MCNC: 3.6 MMOL/L — SIGNIFICANT CHANGE UP (ref 3.5–5.3)
POTASSIUM SERPL-SCNC: 3.6 MMOL/L — SIGNIFICANT CHANGE UP (ref 3.5–5.3)
PROCALCITONIN SERPL-MCNC: 0.11 NG/ML — HIGH (ref 0–0.04)
PROT SERPL-MCNC: 6.1 G/DL — SIGNIFICANT CHANGE UP (ref 6–8.3)
RBC # BLD: 3.9 M/UL — SIGNIFICANT CHANGE UP (ref 3.8–5.2)
RBC # FLD: 15.7 % — HIGH (ref 10.3–14.5)
S AUREUS DNA NOSE QL NAA+PROBE: SIGNIFICANT CHANGE UP
SODIUM SERPL-SCNC: 145 MMOL/L — SIGNIFICANT CHANGE UP (ref 135–145)
WBC # BLD: 10.56 K/UL — HIGH (ref 3.8–10.5)
WBC # FLD AUTO: 10.56 K/UL — HIGH (ref 3.8–10.5)

## 2019-12-04 RX ORDER — GLYCERIN ADULT
1 SUPPOSITORY, RECTAL RECTAL DAILY
Refills: 0 | Status: COMPLETED | OUTPATIENT
Start: 2019-12-04 | End: 2019-12-06

## 2019-12-04 RX ADMIN — Medication 1 SUPPOSITORY(S): at 17:24

## 2019-12-04 RX ADMIN — Medication 0.5 MILLIGRAM(S): at 07:57

## 2019-12-04 RX ADMIN — DIVALPROEX SODIUM 125 MILLIGRAM(S): 500 TABLET, DELAYED RELEASE ORAL at 11:30

## 2019-12-04 RX ADMIN — MONTELUKAST 10 MILLIGRAM(S): 4 TABLET, CHEWABLE ORAL at 23:17

## 2019-12-04 RX ADMIN — ENOXAPARIN SODIUM 40 MILLIGRAM(S): 100 INJECTION SUBCUTANEOUS at 11:30

## 2019-12-04 RX ADMIN — Medication 3 MILLILITER(S): at 00:50

## 2019-12-04 RX ADMIN — LATANOPROST 1 DROP(S): 0.05 SOLUTION/ DROPS OPHTHALMIC; TOPICAL at 23:17

## 2019-12-04 RX ADMIN — Medication 3 MILLILITER(S): at 07:56

## 2019-12-04 RX ADMIN — LACTULOSE 10 GRAM(S): 10 SOLUTION ORAL at 13:55

## 2019-12-04 RX ADMIN — Medication 0.5 MILLIGRAM(S): at 20:11

## 2019-12-04 RX ADMIN — Medication 3 MILLILITER(S): at 20:07

## 2019-12-04 RX ADMIN — Medication 5 MILLIGRAM(S): at 17:23

## 2019-12-04 RX ADMIN — LACTULOSE 10 GRAM(S): 10 SOLUTION ORAL at 05:31

## 2019-12-04 RX ADMIN — DIVALPROEX SODIUM 375 MILLIGRAM(S): 500 TABLET, DELAYED RELEASE ORAL at 11:30

## 2019-12-04 RX ADMIN — SENNA PLUS 2 TABLET(S): 8.6 TABLET ORAL at 23:17

## 2019-12-04 RX ADMIN — Medication 40 MILLIGRAM(S): at 05:29

## 2019-12-04 RX ADMIN — Medication 10 MILLIEQUIVALENT(S): at 11:30

## 2019-12-04 RX ADMIN — LACTULOSE 10 GRAM(S): 10 SOLUTION ORAL at 23:17

## 2019-12-04 NOTE — ADVANCED PRACTICE NURSE CONSULT - ASSESSMENT
Patient is a 58yo bedbound IDDD female admitted for wheezing and coughing lives in a group home HX of cerebral palsy, epilepsy, osteoporosis, bilateral foot drop: Presents with bilateral left and right heel DTPI: Left 1 x 1 right 5 x 4 i suspect these injuries will continue to evolve bilaterally periwound intact, no warmth, erythema, odor noted Z-flex bots on patient to protect from further damage:  RN present for assessment

## 2019-12-04 NOTE — PROGRESS NOTE ADULT - ASSESSMENT
PULMONARY/CRITICAL CARE ASSESSMENT/RECOMMENDATIONS                      RESP TRACT INFECTION   12/3-12/3-12/4/2019 W 20-15-10.5    12/3-12/4/2019 PC .11-.11   12/2-12/3 LA 2.3-.7   12/2 blod culture n   12/2 urine culture n   12/2 Flu AB n RSV n  12/2/2019 RVP parainfluenza 1 detected   12/2/2019 CXR r lung clear elevcated l diaphragm l lung clear s scoliosis   12/3/2019 CT ch l perihilar and lower lobe consolidation R perihilar gg infiltrate   ARNOL 12/3/2019 As w is decreasing and as pc is .11 and as cultures are neg agree with deferring abio If she gets worse will start on zosyn for bact superinfection       COPD  duoneb.4 (12/2) pulmicort (12/2) hydrocort 40.3d (12/2) montelukast 10 (12/2) Cont rx   A fib 12/2 ekg a f Cardizem cd 300 (12/2) On rate control rx   CHF Lasix 20 (12/3) KCL 10 (12/3) Compensated   NEURO PSYCH depakote 375 (12/2) plus depakote 125 (12/2) Cont                  PLAN  Parainfluenza 3 resp tract on supp Rx       TIME SPENT Over 25 minutes aggregate care time spent on encounter; activities included   direct pt care, counseling and/or coordinating care reviewing notes, lab data/ imaging , discussion with multidisciplinary team/ pt /family. Risks, benefits, alternatives  discussed in detail.

## 2019-12-04 NOTE — PROGRESS NOTE ADULT - SUBJECTIVE AND OBJECTIVE BOX
PAST MEDICAL & SURGICAL HISTORY:  Osteoporosis  CP (cerebral palsy)  Scoliosis  Mentally challenged  Epilepsy      MEDICATIONS  (STANDING):  albuterol/ipratropium for Nebulization 3 milliLiter(s) Nebulizer every 6 hours  buDESOnide    Inhalation Suspension 0.5 milliGRAM(s) Inhalation two times a day  dextrose 5%. 1000 milliLiter(s) (50 mL/Hr) IV Continuous <Continuous>  dextrose 50% Injectable 12.5 Gram(s) IV Push once  dextrose 50% Injectable 25 Gram(s) IV Push once  dextrose 50% Injectable 25 Gram(s) IV Push once  diltiazem    milliGRAM(s) Oral daily  diVALproex Sprinkle 375 milliGRAM(s) Oral daily  diVALproex Sprinkle 125 milliGRAM(s) Oral daily  enoxaparin Injectable 40 milliGRAM(s) SubCutaneous daily  furosemide    Tablet 20 milliGRAM(s) Oral daily  hydrocortisone sodium succinate Injectable 40 milliGRAM(s) IV Push daily  lactulose Syrup 10 Gram(s) Oral three times a day  latanoprost 0.005% Ophthalmic Solution 1 Drop(s) Both EYES at bedtime  montelukast 10 milliGRAM(s) Oral at bedtime  potassium chloride    Tablet ER 10 milliEquivalent(s) Oral daily  senna 2 Tablet(s) Oral at bedtime    MEDICATIONS  (PRN):  dextrose 40% Gel 15 Gram(s) Oral once PRN Blood Glucose LESS THAN 70 milliGRAM(s)/deciliter  glucagon  Injectable 1 milliGRAM(s) IntraMuscular once PRN Glucose LESS THAN 70 milligrams/deciliter  LORazepam   Injectable 0.5 milliGRAM(s) IV Push every 4 hours PRN for acute seizure      Patient is a 59y old  Female who presents with a chief complaint of wheezing  coughing (04 Dec 2019 10:29)      Vital Signs Last 24 Hrs  T(C): 36.5 (04 Dec 2019 14:04), Max: 37.2 (04 Dec 2019 05:48)  T(F): 97.7 (04 Dec 2019 14:04), Max: 98.9 (04 Dec 2019 05:48)  HR: 80 (04 Dec 2019 14:21) (80 - 118)  BP: 114/75 (04 Dec 2019 14:04) (100/68 - 122/70)  BP(mean): --  RR: 16 (04 Dec 2019 14:04) (16 - 18)  SpO2: 92% (04 Dec 2019 14:21) (92% - 97%)          12-03 @ 07:01 - 12-04 @ 07:00  --------------------------------------------------------  IN: 580 mL / OUT: 1050 mL / NET: -470 mL    12-04 @ 07:01 - 12-04 @ 15:36  --------------------------------------------------------  IN: 250 mL / OUT: 450 mL / NET: -200 mL        REVIEW OF SYSTEMS:    Constitutional: No fever, weight loss or fatigue  Eyes: No eye pain, visual disturbances, or discharge  ENT:  No difficulty hearing, tinnitus, vertigo; No sinus or throat pain  Neck: No pain or stiffness  Breasts: No pain, masses or nipple discharge  Respiratory: No cough, wheezing, chills or hemoptysis  Cardiovascular: No chest pain, palpitations, shortness of breath, dizziness or leg swelling  Gastrointestinal: No abdominal or epigastric pain. No nausea, vomiting or hematemesis; No diarrhea or constipation. No melena or hematochezia.  Genitourinary: No dysuria, frequency, hematuria or incontinence  Rectal: No pain, hemorrhoids or incontinence  Neurological: No headaches, memory loss, loss of strength, numbness or tremors  Skin: No itching, burning, rashes or lesions   Lymph Nodes: No enlarged glands  Endocrine: No heat or cold intolerance; No hair loss  Musculoskeletal: No joint pain or swelling; No muscle, back or extremity pain  Psychiatric: No depression, anxiety, mood swings or difficulty sleeping  Heme/Lymph: No easy bruising or bleeding gums  Allergy and Immunologic: No hives or eczema    PHYSICAL EXAM:  mom dad at bedside  alert  looking around  with new magazine  amazing appetite  no bm yet  patient is taken to the toilet at home with her wheelchair  Constitutional: NAD,   Respiratory: CTAB/L   Cardiovascular: S1 and S2, RRR, no M/G/R  Gastrointestinal: BS+, soft, NT/ND  Extremities: No peripheral edema  Neurological: A, no focal deficits  Skin: No rashes      decubiti: none                          13.0   10.56 )-----------( 179      ( 04 Dec 2019 07:22 )             39.4     12-04    145  |  110<H>  |  9   ----------------------------<  112<H>  3.6   |  34<H>  |  0.57    Ca    8.1<L>      04 Dec 2019 07:22    TPro  6.1  /  Alb  2.6<L>  /  TBili  0.3  /  DBili  x   /  AST  18  /  ALT  21  /  AlkPhos  57  12-04        CARDIAC MARKERS ( 03 Dec 2019 06:38 )  <.015 ng/mL / x     / x     / x     / x            .Urine Catheterized  12-02 @ 11:02   No growth  --  --      .Blood Blood-Peripheral  12-02 @ 11:00   No growth to date.  --  --        Urine Culture:  12-02 @ 11:02    --        No growth  Urine Culture:  12-02 @ 11:00    --        No growth to date.        Radiology:

## 2019-12-04 NOTE — ADVANCED PRACTICE NURSE CONSULT - RECOMMEDATIONS
1. Continue to off load with z-flex boots  2. Paint DTPI's with cavilon daily  RN educated in the care of this patients skin care

## 2019-12-04 NOTE — PROGRESS NOTE ADULT - SUBJECTIVE AND OBJECTIVE BOX
REVIEW OF SYMPTOMS      Able to give ROS  NO     PHYSICAL EXAM    HEENT Unremarkable PERRLA atraumatic   RESP Fair air entry EXP prolonged    Harsh breath sound Resp distres mild   CARDIAC S1 S2 No S3     NO JVD    ABDOMEN SOFT BS PRESENT NOT DISTENDED No hepatosplenomegaly PEDAL EDEMA present No calf tenderness  NO rash   GENERAL Not TOXIC looking    VITALS/LABS       2019 afeb 84 100/68   2019 Q 10.5 Hb 13 Plt 179 Na 145 K 3.6 CO2 34 Cr .5    JAYA BEJARANO [facility] [phone]  1960 DOA 2019 DR MODESTA RUBY   ALLERGY      colace phenobarb   CONTACT      mother Ada NEGRETE [phone]       PT DATA/BEST PRACTICE  ADVANCED DIRECTIVE       CODE STATUS: [ ] full code  [ ] DNR  [ ] DNI  [ ] MOLST  Goals of care discussion: [ ] yes   WT      60          BMI    24                                                                                                       HEAD OF BED ELEVATION Yes  DIET  Dysph 1 puree Honey ()   IV F       D5 40 (12/3)    DVT PROPHYLAXIS lvnx 40 ()     PATIENT DESCRIPTION        CC              2019   From [place] [facility] sob ro pneum             ER VS          2019  108/60 90 18 98%   ER MX                              HPI             2019  59 f from group home with cough sob                            PMH   cp cerebral palsy mental retardation epilepsy osteioporosis scolisosis          HOME MEDS     albuterol asa 325 pulmicort cardizem 300 depakote 125.3 feso4 fa lasix 20 levoxyl 25 loratadine 10 montelukast 10 prolia

## 2019-12-05 DIAGNOSIS — G80.9 CEREBRAL PALSY, UNSPECIFIED: ICD-10-CM

## 2019-12-05 LAB
ALBUMIN SERPL ELPH-MCNC: 2.7 G/DL — LOW (ref 3.3–5)
ALP SERPL-CCNC: 63 U/L — SIGNIFICANT CHANGE UP (ref 40–120)
ALT FLD-CCNC: 22 U/L — SIGNIFICANT CHANGE UP (ref 12–78)
ANION GAP SERPL CALC-SCNC: 4 MMOL/L — LOW (ref 5–17)
AST SERPL-CCNC: 23 U/L — SIGNIFICANT CHANGE UP (ref 15–37)
BASE EXCESS BLDA CALC-SCNC: 7.1 MMOL/L — HIGH (ref -2–2)
BASOPHILS # BLD AUTO: 0.06 K/UL — SIGNIFICANT CHANGE UP (ref 0–0.2)
BASOPHILS NFR BLD AUTO: 0.5 % — SIGNIFICANT CHANGE UP (ref 0–2)
BILIRUB SERPL-MCNC: 0.4 MG/DL — SIGNIFICANT CHANGE UP (ref 0.2–1.2)
BLOOD GAS COMMENTS ARTERIAL: SIGNIFICANT CHANGE UP
BUN SERPL-MCNC: 9 MG/DL — SIGNIFICANT CHANGE UP (ref 7–23)
CALCIUM SERPL-MCNC: 8.6 MG/DL — SIGNIFICANT CHANGE UP (ref 8.5–10.1)
CHLORIDE SERPL-SCNC: 110 MMOL/L — HIGH (ref 96–108)
CO2 SERPL-SCNC: 33 MMOL/L — HIGH (ref 22–31)
CREAT SERPL-MCNC: 0.42 MG/DL — LOW (ref 0.5–1.3)
EOSINOPHIL # BLD AUTO: 0.02 K/UL — SIGNIFICANT CHANGE UP (ref 0–0.5)
EOSINOPHIL NFR BLD AUTO: 0.2 % — SIGNIFICANT CHANGE UP (ref 0–6)
GLUCOSE SERPL-MCNC: 118 MG/DL — HIGH (ref 70–99)
HCO3 BLDA-SCNC: 30 MMOL/L — HIGH (ref 23–27)
HCT VFR BLD CALC: 40.6 % — SIGNIFICANT CHANGE UP (ref 34.5–45)
HGB BLD-MCNC: 13.3 G/DL — SIGNIFICANT CHANGE UP (ref 11.5–15.5)
HOROWITZ INDEX BLDA+IHG-RTO: 24 — SIGNIFICANT CHANGE UP
IMM GRANULOCYTES NFR BLD AUTO: 1.7 % — HIGH (ref 0–1.5)
LYMPHOCYTES # BLD AUTO: 2.89 K/UL — SIGNIFICANT CHANGE UP (ref 1–3.3)
LYMPHOCYTES # BLD AUTO: 26.3 % — SIGNIFICANT CHANGE UP (ref 13–44)
MCHC RBC-ENTMCNC: 32.8 GM/DL — SIGNIFICANT CHANGE UP (ref 32–36)
MCHC RBC-ENTMCNC: 33.3 PG — SIGNIFICANT CHANGE UP (ref 27–34)
MCV RBC AUTO: 101.5 FL — HIGH (ref 80–100)
MONOCYTES # BLD AUTO: 0.98 K/UL — HIGH (ref 0–0.9)
MONOCYTES NFR BLD AUTO: 8.9 % — SIGNIFICANT CHANGE UP (ref 2–14)
NEUTROPHILS # BLD AUTO: 6.84 K/UL — SIGNIFICANT CHANGE UP (ref 1.8–7.4)
NEUTROPHILS NFR BLD AUTO: 62.4 % — SIGNIFICANT CHANGE UP (ref 43–77)
NRBC # BLD: 0 /100 WBCS — SIGNIFICANT CHANGE UP (ref 0–0)
PCO2 BLDA: 48 MMHG — HIGH (ref 32–46)
PH BLDA: 7.44 — SIGNIFICANT CHANGE UP (ref 7.35–7.45)
PLATELET # BLD AUTO: 238 K/UL — SIGNIFICANT CHANGE UP (ref 150–400)
PO2 BLDA: 74 MMHG — SIGNIFICANT CHANGE UP (ref 74–108)
POTASSIUM SERPL-MCNC: 4.1 MMOL/L — SIGNIFICANT CHANGE UP (ref 3.5–5.3)
POTASSIUM SERPL-SCNC: 4.1 MMOL/L — SIGNIFICANT CHANGE UP (ref 3.5–5.3)
PROT SERPL-MCNC: 6.4 G/DL — SIGNIFICANT CHANGE UP (ref 6–8.3)
RBC # BLD: 4 M/UL — SIGNIFICANT CHANGE UP (ref 3.8–5.2)
RBC # FLD: 15.5 % — HIGH (ref 10.3–14.5)
S PNEUM AG SER QL: SIGNIFICANT CHANGE UP
SAO2 % BLDA: 94 % — SIGNIFICANT CHANGE UP (ref 92–96)
SODIUM SERPL-SCNC: 147 MMOL/L — HIGH (ref 135–145)
WBC # BLD: 10.98 K/UL — HIGH (ref 3.8–10.5)
WBC # FLD AUTO: 10.98 K/UL — HIGH (ref 3.8–10.5)

## 2019-12-05 PROCEDURE — 71045 X-RAY EXAM CHEST 1 VIEW: CPT | Mod: 26

## 2019-12-05 RX ORDER — HYDROCORTISONE 20 MG
40 TABLET ORAL DAILY
Refills: 0 | Status: COMPLETED | OUTPATIENT
Start: 2019-12-05 | End: 2019-12-08

## 2019-12-05 RX ADMIN — Medication 3 MILLILITER(S): at 07:58

## 2019-12-05 RX ADMIN — MONTELUKAST 10 MILLIGRAM(S): 4 TABLET, CHEWABLE ORAL at 22:05

## 2019-12-05 RX ADMIN — Medication 3 MILLILITER(S): at 14:18

## 2019-12-05 RX ADMIN — DIVALPROEX SODIUM 375 MILLIGRAM(S): 500 TABLET, DELAYED RELEASE ORAL at 21:54

## 2019-12-05 RX ADMIN — Medication 0.5 MILLIGRAM(S): at 07:58

## 2019-12-05 RX ADMIN — LACTULOSE 10 GRAM(S): 10 SOLUTION ORAL at 22:05

## 2019-12-05 RX ADMIN — SENNA PLUS 2 TABLET(S): 8.6 TABLET ORAL at 22:05

## 2019-12-05 RX ADMIN — Medication 300 MILLIGRAM(S): at 06:19

## 2019-12-05 RX ADMIN — Medication 0.5 MILLIGRAM(S): at 19:44

## 2019-12-05 RX ADMIN — Medication 40 MILLIGRAM(S): at 06:19

## 2019-12-05 RX ADMIN — Medication 10 MILLIEQUIVALENT(S): at 11:19

## 2019-12-05 RX ADMIN — Medication 3 MILLILITER(S): at 19:42

## 2019-12-05 RX ADMIN — LATANOPROST 1 DROP(S): 0.05 SOLUTION/ DROPS OPHTHALMIC; TOPICAL at 22:06

## 2019-12-05 RX ADMIN — LACTULOSE 10 GRAM(S): 10 SOLUTION ORAL at 06:28

## 2019-12-05 RX ADMIN — ENOXAPARIN SODIUM 40 MILLIGRAM(S): 100 INJECTION SUBCUTANEOUS at 11:19

## 2019-12-05 RX ADMIN — Medication 20 MILLIGRAM(S): at 06:19

## 2019-12-05 NOTE — CONSULT NOTE ADULT - ASSESSMENT
60 yo mentally challenged female brought in from group home with report of wheezing, coughing, noted to have abn lung exam and imaging and parainflu+ by RVP

## 2019-12-05 NOTE — PROGRESS NOTE ADULT - SUBJECTIVE AND OBJECTIVE BOX
PAST MEDICAL & SURGICAL HISTORY:  Osteoporosis  CP (cerebral palsy)  Scoliosis  Mentally challenged  Epilepsy      MEDICATIONS  (STANDING):  albuterol/ipratropium for Nebulization 3 milliLiter(s) Nebulizer every 6 hours  buDESOnide    Inhalation Suspension 0.5 milliGRAM(s) Inhalation two times a day  dextrose 5%. 1000 milliLiter(s) (50 mL/Hr) IV Continuous <Continuous>  dextrose 50% Injectable 12.5 Gram(s) IV Push once  dextrose 50% Injectable 25 Gram(s) IV Push once  dextrose 50% Injectable 25 Gram(s) IV Push once  diltiazem    milliGRAM(s) Oral daily  diVALproex Sprinkle 375 milliGRAM(s) Oral daily  diVALproex Sprinkle 125 milliGRAM(s) Oral daily  enoxaparin Injectable 40 milliGRAM(s) SubCutaneous daily  furosemide    Tablet 20 milliGRAM(s) Oral daily  glycerin Suppository - Adult 1 Suppository(s) Rectal daily  hydrocortisone sodium succinate Injectable 40 milliGRAM(s) IV Push daily  lactulose Syrup 10 Gram(s) Oral three times a day  latanoprost 0.005% Ophthalmic Solution 1 Drop(s) Both EYES at bedtime  montelukast 10 milliGRAM(s) Oral at bedtime  potassium chloride    Tablet ER 10 milliEquivalent(s) Oral daily  senna 2 Tablet(s) Oral at bedtime    MEDICATIONS  (PRN):  dextrose 40% Gel 15 Gram(s) Oral once PRN Blood Glucose LESS THAN 70 milliGRAM(s)/deciliter  glucagon  Injectable 1 milliGRAM(s) IntraMuscular once PRN Glucose LESS THAN 70 milligrams/deciliter  LORazepam   Injectable 0.5 milliGRAM(s) IV Push every 4 hours PRN for acute seizure      Patient is a 59y old  Female who presents with a chief complaint of wheezing  coughing (05 Dec 2019 12:18)      Vital Signs Last 24 Hrs  T(C): 37.7 (05 Dec 2019 05:52), Max: 37.7 (05 Dec 2019 05:52)  T(F): 99.8 (05 Dec 2019 05:52), Max: 99.8 (05 Dec 2019 05:52)  HR: 100 (05 Dec 2019 08:02) (72 - 108)  BP: 128/70 (05 Dec 2019 05:52) (109/68 - 128/70)  BP(mean): --  RR: 17 (05 Dec 2019 05:52) (16 - 18)  SpO2: 93% (05 Dec 2019 10:45) (87% - 96%)          12-04 @ 07:01  -  12-05 @ 07:00  --------------------------------------------------------  IN: 400 mL / OUT: 750 mL / NET: -350 mL        REVIEW OF SYSTEMS:    Constitutional: No fever, weight loss or fatigue  Eyes: No eye pain, visual disturbances, or discharge  ENT:  No difficulty hearing, tinnitus, vertigo; No sinus or throat pain  Neck: No pain or stiffness  Breasts: No pain, masses or nipple discharge  Respiratory: No cough, wheezing, chills or hemoptysis  Cardiovascular: No chest pain, palpitations, shortness of breath, dizziness or leg swelling  Gastrointestinal: No abdominal or epigastric pain. No nausea, vomiting or hematemesis; No diarrhea or constipation. No melena or hematochezia.  Genitourinary: No dysuria, frequency, hematuria or incontinence  Rectal: No pain, hemorrhoids or incontinence  Neurological: No headaches, memory loss, loss of strength, numbness or tremors  Skin: No itching, burning, rashes or lesions   Lymph Nodes: No enlarged glands  Endocrine: No heat or cold intolerance; No hair loss  Musculoskeletal: No joint pain or swelling; No muscle, back or extremity pain  Psychiatric: No depression, anxiety, mood swings or difficulty sleeping  Heme/Lymph: No easy bruising or bleeding gums  Allergy and Immunologic: No hives or eczema    PHYSICAL EXAM: off o2 pulse ox 88  alert s/p large bm without the glycerine supp  ate all her breakfast  sitting up  mom dad at bedside  Constitutional: NAD,  Respiratory: bl wheezing/rhonchi   Cardiovascular: S1 and S2, RRR, no M/G/R  Gastrointestinal: BS+, soft, NT/ND  Extremities: No peripheral edema  Neurological: A, no focal deficits  Skin: No rashes      decubiti: none                          13.3   10.98 )-----------( 238      ( 05 Dec 2019 08:35 )             40.6     12-05    147<H>  |  110<H>  |  9   ----------------------------<  118<H>  4.1   |  33<H>  |  0.42<L>    Ca    8.6      05 Dec 2019 08:35    TPro  6.4  /  Alb  2.7<L>  /  TBili  0.4  /  DBili  x   /  AST  23  /  ALT  22  /  AlkPhos  63  12-05              .Blood Blood  12-03 @ 21:31   No growth to date.  --  --      .Urine Catheterized  12-02 @ 11:02   No growth  --  --      .Blood Blood-Peripheral  12-02 @ 11:00   No growth to date.  --  --        Urine Culture:  12-03 @ 21:31    --        No growth to date.  Urine Culture:  12-02 @ 11:02    --        No growth  Urine Culture:  12-02 @ 11:00    --        No growth to date.        Radiology:  cxr today pending

## 2019-12-05 NOTE — PROGRESS NOTE ADULT - SUBJECTIVE AND OBJECTIVE BOX
CARLEE CARRAS    V 3WES 355 W1    Patient is a 59y old  Female who presents with a chief complaint of wheezing  coughing (05 Dec 2019 12:58)       Allergies    Colace (Unknown)  phenobarbital (Unknown)    Intolerances        HPI:  wheezing  coughing  leaves in group home (02 Dec 2019 14:41)      PAST MEDICAL & SURGICAL HISTORY:  Osteoporosis  CP (cerebral palsy)  Scoliosis  Mentally challenged  Epilepsy      FAMILY HISTORY:        MEDICATIONS   albuterol/ipratropium for Nebulization 3 milliLiter(s) Nebulizer every 6 hours  buDESOnide    Inhalation Suspension 0.5 milliGRAM(s) Inhalation two times a day  dextrose 40% Gel 15 Gram(s) Oral once PRN  dextrose 5%. 1000 milliLiter(s) IV Continuous <Continuous>  dextrose 50% Injectable 12.5 Gram(s) IV Push once  dextrose 50% Injectable 25 Gram(s) IV Push once  dextrose 50% Injectable 25 Gram(s) IV Push once  diltiazem    milliGRAM(s) Oral daily  diVALproex Sprinkle 375 milliGRAM(s) Oral daily  diVALproex Sprinkle 125 milliGRAM(s) Oral daily  enoxaparin Injectable 40 milliGRAM(s) SubCutaneous daily  furosemide    Tablet 20 milliGRAM(s) Oral daily  glucagon  Injectable 1 milliGRAM(s) IntraMuscular once PRN  glycerin Suppository - Adult 1 Suppository(s) Rectal daily  hydrocortisone sodium succinate Injectable 40 milliGRAM(s) IV Push daily  lactulose Syrup 10 Gram(s) Oral three times a day  latanoprost 0.005% Ophthalmic Solution 1 Drop(s) Both EYES at bedtime  LORazepam   Injectable 0.5 milliGRAM(s) IV Push every 4 hours PRN  montelukast 10 milliGRAM(s) Oral at bedtime  potassium chloride    Tablet ER 10 milliEquivalent(s) Oral daily  senna 2 Tablet(s) Oral at bedtime      Vital Signs Last 24 Hrs  T(C): 37.1 (05 Dec 2019 13:54), Max: 37.7 (05 Dec 2019 05:52)  T(F): 98.8 (05 Dec 2019 13:54), Max: 99.8 (05 Dec 2019 05:52)  HR: 82 (05 Dec 2019 14:19) (72 - 105)  BP: 102/63 (05 Dec 2019 13:54) (102/63 - 128/70)  BP(mean): --  RR: 18 (05 Dec 2019 13:54) (17 - 18)  SpO2: 95% (05 Dec 2019 14:19) (87% - 96%)      12-04-19 @ 07:01  -  12-05-19 @ 07:00  --------------------------------------------------------  IN: 400 mL / OUT: 750 mL / NET: -350 mL    12-05-19 @ 07:01  -  12-05-19 @ 16:07  --------------------------------------------------------  IN: 80 mL / OUT: 450 mL / NET: -370 mL            LABS:                        13.3   10.98 )-----------( 238      ( 05 Dec 2019 08:35 )             40.6     12-05    147<H>  |  110<H>  |  9   ----------------------------<  118<H>  4.1   |  33<H>  |  0.42<L>    Ca    8.6      05 Dec 2019 08:35    TPro  6.4  /  Alb  2.7<L>  /  TBili  0.4  /  DBili  x   /  AST  23  /  ALT  22  /  AlkPhos  63  12-05          ABG - ( 05 Dec 2019 14:58 )  pH, Arterial: 7.44  pH, Blood: x     /  pCO2: 48    /  pO2: 74    / HCO3: 30    / Base Excess: 7.1   /  SaO2: 94                  WBC:  WBC Count: 10.98 K/uL (12-05 @ 08:35)  WBC Count: 10.56 K/uL (12-04 @ 07:22)  WBC Count: 15.01 K/uL (12-03 @ 06:38)  WBC Count: 20.48 K/uL (12-02 @ 08:19)      MICROBIOLOGY:  RECENT CULTURES:  12-03 .Blood Blood XXXX XXXX   No growth to date.    12-02 .Urine Catheterized XXXX XXXX   No growth    12-02 .Blood Blood-Peripheral XXXX XXXX   No growth to date.                    Sodium:  Sodium, Serum: 147 mmol/L (12-05 @ 08:35)  Sodium, Serum: 145 mmol/L (12-04 @ 07:22)  Sodium, Serum: 146 mmol/L (12-03 @ 06:38)  Sodium, Serum: 142 mmol/L (12-02 @ 08:19)      0.42 mg/dL 12-05 @ 08:35  0.57 mg/dL 12-04 @ 07:22  0.53 mg/dL 12-03 @ 06:38  0.67 mg/dL 12-02 @ 08:19      Hemoglobin:  Hemoglobin: 13.3 g/dL (12-05 @ 08:35)  Hemoglobin: 13.0 g/dL (12-04 @ 07:22)  Hemoglobin: 13.1 g/dL (12-03 @ 06:38)  Hemoglobin: 15.8 g/dL (12-02 @ 08:19)      Platelets: Platelet Count - Automated: 238 K/uL (12-05 @ 08:35)  Platelet Count - Automated: 179 K/uL (12-04 @ 07:22)  Platelet Count - Automated: 159 K/uL (12-03 @ 06:38)  Platelet Count - Automated: 185 K/uL (12-02 @ 08:19)      LIVER FUNCTIONS - ( 05 Dec 2019 08:35 )  Alb: 2.7 g/dL / Pro: 6.4 g/dL / ALK PHOS: 63 U/L / ALT: 22 U/L / AST: 23 U/L / GGT: x                 RADIOLOGY & ADDITIONAL STUDIES:

## 2019-12-05 NOTE — PROGRESS NOTE ADULT - ASSESSMENT
cont rx cont rx    REVIEW OF SYMPTOMS      Able to give ROS  NO     PHYSICAL EXAM    HEENT Unremarkable PERRLA atraumatic   RESP Fair air entry EXP prolonged    Harsh breath sound Resp distres mild   CARDIAC S1 S2 No S3     NO JVD    ABDOMEN SOFT BS PRESENT NOT DISTENDED No hepatosplenomegaly PEDAL EDEMA present No calf tenderness  NO rash   GENERAL Not TOXIC looking    VITALS/LABS       12/5/2019 afeb 83 102/63 18 92%   12/5/2019 W 10.9 Hb 13./3 Plt 238 Na 147 K 4.1 CO2 33 Cr .4     PATIENT DESCRIPTION        CC              12/2/2019   From [facility] sob ro pneum             ER VS          12/2/2019  108/60 90 18 98%   ER MX                              HPI             12/2/2019  59 f from group home with cough sob                            PMH   cp cerebral palsy mental retardation epilepsy osteioporosis scolisosis          HOME MEDS     albuterol asa 325 pulmicort cardizem 300 depakote 125.3 feso4 fa lasix 20 levoxyl 25 loratadine 10 montelukast 10 prolia symbicort                                                  PULMONARY/CRITICAL CARE ASSESSMENT/RECOMMENDATIONS                      RESP TRACT INFECTION   12/3-12/3-12/4-12/5/2019 W 20-15-10.5-10.9    12/3-12/4/2019 PC .11-.11   12/2-12/3 LA 2.3-.7   12/2 blod culture n   12/2 urine culture n   12/2 Flu AB n RSV n  12/2/2019 RVP parainfluenza 1 detected   12/2/2019 CXR r lung clear elevcated l diaphragm l lung clear s scoliosis   12/3/2019 CT ch l perihilar and lower lobe consolidation R perihilar gg infiltrate   ARNOL 12/3/2019 As w is decreasing and as pc is .11 and as cultures are neg agree with deferring abio If she gets worse will start on zosyn for bact superinfection       COPD  duoneb.4 (12/2) pulmicort (12/2) hydrocort 40.3d (12/2) montelukast 10 (12/2) Cont rx   A fib 12/2 ekg a f Cardizem cd 300 (12/2) On rate control rx   CHF Lasix 20 (12/3) KCL 10 (12/3) Compensated   NEURO PSYCH depakote 375 (12/2) plus depakote 125 (12/2) Cont                PLAN  Parainfluenza 3 resp tract on supp Rx         TIME SPENT Over 25 minutes aggregate care time spent on encounter; activities included   direct pt care, counseling and/or coordinating care reviewing notes, lab data/ imaging , discussion with multidisciplinary team/ pt /family. Risks, benefits, alternatives  discussed in detail.

## 2019-12-05 NOTE — CONSULT NOTE ADULT - PROBLEM SELECTOR RECOMMENDATION 9
patient improving off antibiotics with continued ronchi which would be characteristic of paraflu infection particularly given report by parents of a prior history of asthma.  Recommend continued observation off abx and symptomatic Rx.

## 2019-12-06 LAB
ALBUMIN SERPL ELPH-MCNC: 2.6 G/DL — LOW (ref 3.3–5)
ALP SERPL-CCNC: 55 U/L — SIGNIFICANT CHANGE UP (ref 40–120)
ALT FLD-CCNC: 20 U/L — SIGNIFICANT CHANGE UP (ref 12–78)
ANION GAP SERPL CALC-SCNC: 4 MMOL/L — LOW (ref 5–17)
AST SERPL-CCNC: 19 U/L — SIGNIFICANT CHANGE UP (ref 15–37)
BASOPHILS # BLD AUTO: 0.1 K/UL — SIGNIFICANT CHANGE UP (ref 0–0.2)
BASOPHILS NFR BLD AUTO: 1 % — SIGNIFICANT CHANGE UP (ref 0–2)
BILIRUB SERPL-MCNC: 0.4 MG/DL — SIGNIFICANT CHANGE UP (ref 0.2–1.2)
BUN SERPL-MCNC: 10 MG/DL — SIGNIFICANT CHANGE UP (ref 7–23)
CALCIUM SERPL-MCNC: 8.6 MG/DL — SIGNIFICANT CHANGE UP (ref 8.5–10.1)
CHLORIDE SERPL-SCNC: 111 MMOL/L — HIGH (ref 96–108)
CO2 SERPL-SCNC: 33 MMOL/L — HIGH (ref 22–31)
CREAT SERPL-MCNC: 0.42 MG/DL — LOW (ref 0.5–1.3)
EOSINOPHIL # BLD AUTO: 0 K/UL — SIGNIFICANT CHANGE UP (ref 0–0.5)
EOSINOPHIL NFR BLD AUTO: 0 % — SIGNIFICANT CHANGE UP (ref 0–6)
GLUCOSE SERPL-MCNC: 101 MG/DL — HIGH (ref 70–99)
HCT VFR BLD CALC: 39.2 % — SIGNIFICANT CHANGE UP (ref 34.5–45)
HGB BLD-MCNC: 12.8 G/DL — SIGNIFICANT CHANGE UP (ref 11.5–15.5)
LYMPHOCYTES # BLD AUTO: 4.14 K/UL — HIGH (ref 1–3.3)
LYMPHOCYTES # BLD AUTO: 40 % — SIGNIFICANT CHANGE UP (ref 13–44)
MANUAL SMEAR VERIFICATION: SIGNIFICANT CHANGE UP
MCHC RBC-ENTMCNC: 32.7 GM/DL — SIGNIFICANT CHANGE UP (ref 32–36)
MCHC RBC-ENTMCNC: 33.1 PG — SIGNIFICANT CHANGE UP (ref 27–34)
MCV RBC AUTO: 101.3 FL — HIGH (ref 80–100)
MONOCYTES # BLD AUTO: 0.93 K/UL — HIGH (ref 0–0.9)
MONOCYTES NFR BLD AUTO: 9 % — SIGNIFICANT CHANGE UP (ref 2–14)
NEUTROPHILS # BLD AUTO: 4.14 K/UL — SIGNIFICANT CHANGE UP (ref 1.8–7.4)
NEUTROPHILS NFR BLD AUTO: 38 % — LOW (ref 43–77)
NEUTS BAND # BLD: 2 % — SIGNIFICANT CHANGE UP (ref 0–8)
NRBC # BLD: 0 — SIGNIFICANT CHANGE UP
NRBC # BLD: SIGNIFICANT CHANGE UP /100 WBCS (ref 0–0)
PLAT MORPH BLD: NORMAL — SIGNIFICANT CHANGE UP
PLATELET # BLD AUTO: 249 K/UL — SIGNIFICANT CHANGE UP (ref 150–400)
POTASSIUM SERPL-MCNC: 3.8 MMOL/L — SIGNIFICANT CHANGE UP (ref 3.5–5.3)
POTASSIUM SERPL-SCNC: 3.8 MMOL/L — SIGNIFICANT CHANGE UP (ref 3.5–5.3)
PROT SERPL-MCNC: 5.9 G/DL — LOW (ref 6–8.3)
RBC # BLD: 3.87 M/UL — SIGNIFICANT CHANGE UP (ref 3.8–5.2)
RBC # FLD: 15.4 % — HIGH (ref 10.3–14.5)
RBC BLD AUTO: SIGNIFICANT CHANGE UP
SODIUM SERPL-SCNC: 148 MMOL/L — HIGH (ref 135–145)
VARIANT LYMPHS # BLD: 10 % — HIGH (ref 0–6)
WBC # BLD: 10.36 K/UL — SIGNIFICANT CHANGE UP (ref 3.8–10.5)
WBC # FLD AUTO: 10.36 K/UL — SIGNIFICANT CHANGE UP (ref 3.8–10.5)

## 2019-12-06 RX ORDER — SODIUM CHLORIDE 9 MG/ML
1000 INJECTION, SOLUTION INTRAVENOUS
Refills: 0 | Status: DISCONTINUED | OUTPATIENT
Start: 2019-12-06 | End: 2019-12-08

## 2019-12-06 RX ADMIN — ENOXAPARIN SODIUM 40 MILLIGRAM(S): 100 INJECTION SUBCUTANEOUS at 11:38

## 2019-12-06 RX ADMIN — DIVALPROEX SODIUM 375 MILLIGRAM(S): 500 TABLET, DELAYED RELEASE ORAL at 11:38

## 2019-12-06 RX ADMIN — Medication 1 SUPPOSITORY(S): at 11:39

## 2019-12-06 RX ADMIN — Medication 3 MILLILITER(S): at 13:20

## 2019-12-06 RX ADMIN — SENNA PLUS 2 TABLET(S): 8.6 TABLET ORAL at 22:21

## 2019-12-06 RX ADMIN — Medication 20 MILLIGRAM(S): at 06:16

## 2019-12-06 RX ADMIN — LATANOPROST 1 DROP(S): 0.05 SOLUTION/ DROPS OPHTHALMIC; TOPICAL at 22:22

## 2019-12-06 RX ADMIN — SODIUM CHLORIDE 50 MILLILITER(S): 9 INJECTION, SOLUTION INTRAVENOUS at 22:22

## 2019-12-06 RX ADMIN — Medication 300 MILLIGRAM(S): at 06:16

## 2019-12-06 RX ADMIN — Medication 0.5 MILLIGRAM(S): at 19:28

## 2019-12-06 RX ADMIN — LACTULOSE 10 GRAM(S): 10 SOLUTION ORAL at 06:15

## 2019-12-06 RX ADMIN — MONTELUKAST 10 MILLIGRAM(S): 4 TABLET, CHEWABLE ORAL at 22:21

## 2019-12-06 RX ADMIN — LACTULOSE 10 GRAM(S): 10 SOLUTION ORAL at 22:21

## 2019-12-06 RX ADMIN — DIVALPROEX SODIUM 125 MILLIGRAM(S): 500 TABLET, DELAYED RELEASE ORAL at 11:38

## 2019-12-06 RX ADMIN — Medication 3 MILLILITER(S): at 08:00

## 2019-12-06 RX ADMIN — Medication 10 MILLIEQUIVALENT(S): at 11:38

## 2019-12-06 RX ADMIN — Medication 3 MILLILITER(S): at 19:28

## 2019-12-06 RX ADMIN — Medication 0.5 MILLIGRAM(S): at 08:00

## 2019-12-06 RX ADMIN — Medication 40 MILLIGRAM(S): at 06:15

## 2019-12-06 RX ADMIN — LACTULOSE 10 GRAM(S): 10 SOLUTION ORAL at 11:39

## 2019-12-06 NOTE — PROGRESS NOTE ADULT - SUBJECTIVE AND OBJECTIVE BOX
CARLEE JYAA    V 3WES 355 W1    Patient is a 59y old  Female who presents with a chief complaint of wheezing  coughing (05 Dec 2019 16:07)       Allergies    Colace (Unknown)  phenobarbital (Unknown)    Intolerances        HPI:  wheezing  coughing  leaves in group home (02 Dec 2019 14:41)      PAST MEDICAL & SURGICAL HISTORY:  Osteoporosis  CP (cerebral palsy)  Scoliosis  Mentally challenged  Epilepsy      FAMILY HISTORY:        MEDICATIONS   albuterol/ipratropium for Nebulization 3 milliLiter(s) Nebulizer every 6 hours  buDESOnide    Inhalation Suspension 0.5 milliGRAM(s) Inhalation two times a day  dextrose 40% Gel 15 Gram(s) Oral once PRN  dextrose 5%. 1000 milliLiter(s) IV Continuous <Continuous>  dextrose 50% Injectable 12.5 Gram(s) IV Push once  dextrose 50% Injectable 25 Gram(s) IV Push once  dextrose 50% Injectable 25 Gram(s) IV Push once  diltiazem    milliGRAM(s) Oral daily  diVALproex Sprinkle 375 milliGRAM(s) Oral daily  diVALproex Sprinkle 125 milliGRAM(s) Oral daily  enoxaparin Injectable 40 milliGRAM(s) SubCutaneous daily  furosemide    Tablet 20 milliGRAM(s) Oral daily  glucagon  Injectable 1 milliGRAM(s) IntraMuscular once PRN  glycerin Suppository - Adult 1 Suppository(s) Rectal daily  hydrocortisone sodium succinate Injectable 40 milliGRAM(s) IV Push daily  lactulose Syrup 10 Gram(s) Oral three times a day  latanoprost 0.005% Ophthalmic Solution 1 Drop(s) Both EYES at bedtime  LORazepam   Injectable 0.5 milliGRAM(s) IV Push every 4 hours PRN  montelukast 10 milliGRAM(s) Oral at bedtime  potassium chloride    Tablet ER 10 milliEquivalent(s) Oral daily  senna 2 Tablet(s) Oral at bedtime      Vital Signs Last 24 Hrs  T(C): 37.3 (06 Dec 2019 05:15), Max: 37.3 (06 Dec 2019 05:15)  T(F): 99.2 (06 Dec 2019 05:15), Max: 99.2 (06 Dec 2019 05:15)  HR: 90 (06 Dec 2019 08:02) (82 - 96)  BP: 145/86 (06 Dec 2019 05:15) (102/63 - 145/86)  BP(mean): --  RR: 17 (06 Dec 2019 05:15) (16 - 18)  SpO2: 96% (06 Dec 2019 08:02) (87% - 96%)      12-05-19 @ 07:01  -  12-06-19 @ 07:00  --------------------------------------------------------  IN: 100 mL / OUT: 850 mL / NET: -750 mL            LABS:                        12.8   10.36 )-----------( 249      ( 06 Dec 2019 08:20 )             39.2     12-06    148<H>  |  111<H>  |  10  ----------------------------<  101<H>  3.8   |  33<H>  |  0.42<L>    Ca    8.6      06 Dec 2019 08:20    TPro  5.9<L>  /  Alb  2.6<L>  /  TBili  0.4  /  DBili  x   /  AST  19  /  ALT  20  /  AlkPhos  55  12-06          ABG - ( 05 Dec 2019 14:58 )  pH, Arterial: 7.44  pH, Blood: x     /  pCO2: 48    /  pO2: 74    / HCO3: 30    / Base Excess: 7.1   /  SaO2: 94                  WBC:  WBC Count: 10.36 K/uL (12-06 @ 08:20)  WBC Count: 10.98 K/uL (12-05 @ 08:35)  WBC Count: 10.56 K/uL (12-04 @ 07:22)  WBC Count: 15.01 K/uL (12-03 @ 06:38)      MICROBIOLOGY:  RECENT CULTURES:  12-03 .Blood Blood XXXX XXXX   No growth to date.    12-02 .Urine Catheterized XXXX XXXX   No growth    12-02 .Blood Blood-Peripheral XXXX XXXX   No growth to date.                    Sodium:  Sodium, Serum: 148 mmol/L (12-06 @ 08:20)  Sodium, Serum: 147 mmol/L (12-05 @ 08:35)  Sodium, Serum: 145 mmol/L (12-04 @ 07:22)  Sodium, Serum: 146 mmol/L (12-03 @ 06:38)      0.42 mg/dL 12-06 @ 08:20  0.42 mg/dL 12-05 @ 08:35  0.57 mg/dL 12-04 @ 07:22  0.53 mg/dL 12-03 @ 06:38      Hemoglobin:  Hemoglobin: 12.8 g/dL (12-06 @ 08:20)  Hemoglobin: 13.3 g/dL (12-05 @ 08:35)  Hemoglobin: 13.0 g/dL (12-04 @ 07:22)  Hemoglobin: 13.1 g/dL (12-03 @ 06:38)      Platelets: Platelet Count - Automated: 249 K/uL (12-06 @ 08:20)  Platelet Count - Automated: 238 K/uL (12-05 @ 08:35)  Platelet Count - Automated: 179 K/uL (12-04 @ 07:22)  Platelet Count - Automated: 159 K/uL (12-03 @ 06:38)      LIVER FUNCTIONS - ( 06 Dec 2019 08:20 )  Alb: 2.6 g/dL / Pro: 5.9 g/dL / ALK PHOS: 55 U/L / ALT: 20 U/L / AST: 19 U/L / GGT: x                 RADIOLOGY & ADDITIONAL STUDIES:

## 2019-12-06 NOTE — PROGRESS NOTE ADULT - ASSESSMENT
cont rx REVIEW OF SYMPTOMS      Able to give ROS  NO     PHYSICAL EXAM    HEENT Unremarkable PERRLA atraumatic   RESP Fair air entry EXP prolonged    Harsh breath sound Resp distres mild   CARDIAC S1 S2 No S3     NO JVD    ABDOMEN SOFT BS PRESENT NOT DISTENDED No hepatosplenomegaly PEDAL EDEMA present No calf tenderness  NO rash   GENERAL Not TOXIC looking    VITALS/LABS       2019 99f 101 107/71   2019 W 10.3 Hb 12.8 Plt 249 Na 148 K 3.8 CO2 33 Cr .4     JAYA BEJARANO [facility] [phone]  1960 DOA 2019 [address] MODESTA RUBY   ALLERGY      colace phenobarb   CONTACT      mother Ada NEGRETE [phone]       PT DATA/BEST PRACTICE  ADVANCED DIRECTIVE       CODE STATUS: [ ] full code  [ ] DNR  [ ] DNI  [ ] MOLST  Goals of care discussion: [ ] yes   WT      60          BMI    24                                                                                                       HEAD OF BED ELEVATION Yes  DIET  Dysph 1 puree Honey ()   IV F       D5 40 (12/3-)    DVT PROPHYLAXIS lvnx 40 ()     PATIENT DESCRIPTION        CC              2019   From [facility] sob ro pneum             ER VS          2019  108/60 90 18 98%   ER MX                              HPI             2019  59 f from group home with cough sob                            PMH   cp cerebral palsy mental retardation epilepsy osteioporosis scolisosis          HOME MEDS     albuterol asa 325 pulmicort cardizem 300 depakote 125.3 feso4 fa lasix 20 levoxyl 25 loratadine 10 montelukast 10 prolia symbicort                                                  PULMONARY/CRITICAL CARE ASSESSMENT/RECOMMENDATIONS                      RESP TRACT INFECTION   12/3-12/3---2019 W 20-15-10.5-10.9-10.3  12/3-2019 PC .11-.11   -12/3 LA 2.3-.7    blod culture n    urine culture n    Flu AB n RSV n   Leg n  mrsa p n   2019 RVP parainfluenza 1 detected   2019 CXR r lung clear elevcated l diaphragm l lung clear s scoliosis   12/3/2019 CT ch l perihilar and lower lobe consolidation R perihilar gg infiltrate   ARNOL 12/3/2019 As w is decreasing and as pc is .11 and as cultures are neg agree with deferring abio If she gets worse will start on zosyn for bact superinfection       RESP GAS EXCHANGE  1l 744/48/74 Gas exchange acceptable   COPD  duoneb.4 () pulmicort () hydrocort 40.3d () montelukast 10 () Cont rx   A fib  ekg a f Cardizem cd 300 () On rate control rx   CHF Lasix 20 (12/3) KCL 10 (12/3) Compensated   NEURO PSYCH depakote 375 () plus depakote 125 () Cont                    PLAN  Parainfluenza 3 resp tract on supp Rx   2019 D5 50 started as Na elevated       TIME SPENT Over 25 minutes aggregate care time spent on encounter; activities included   direct pt care, counseling and/or coordinating care reviewing notes, lab data/ imaging , discussion with multidisciplinary team/ pt /family. Risks, benefits, alternatives  discussed in detail.

## 2019-12-07 RX ADMIN — Medication 3 MILLILITER(S): at 14:22

## 2019-12-07 RX ADMIN — Medication 3 MILLILITER(S): at 20:22

## 2019-12-07 RX ADMIN — Medication 3 MILLILITER(S): at 00:08

## 2019-12-07 RX ADMIN — Medication 3 MILLILITER(S): at 07:31

## 2019-12-07 RX ADMIN — LACTULOSE 10 GRAM(S): 10 SOLUTION ORAL at 21:15

## 2019-12-07 RX ADMIN — SODIUM CHLORIDE 50 MILLILITER(S): 9 INJECTION, SOLUTION INTRAVENOUS at 17:15

## 2019-12-07 RX ADMIN — Medication 40 MILLIGRAM(S): at 06:23

## 2019-12-07 RX ADMIN — DIVALPROEX SODIUM 125 MILLIGRAM(S): 500 TABLET, DELAYED RELEASE ORAL at 12:31

## 2019-12-07 RX ADMIN — LACTULOSE 10 GRAM(S): 10 SOLUTION ORAL at 06:18

## 2019-12-07 RX ADMIN — LACTULOSE 10 GRAM(S): 10 SOLUTION ORAL at 16:11

## 2019-12-07 RX ADMIN — LATANOPROST 1 DROP(S): 0.05 SOLUTION/ DROPS OPHTHALMIC; TOPICAL at 21:16

## 2019-12-07 RX ADMIN — DIVALPROEX SODIUM 375 MILLIGRAM(S): 500 TABLET, DELAYED RELEASE ORAL at 12:31

## 2019-12-07 RX ADMIN — Medication 0.5 MILLIGRAM(S): at 07:31

## 2019-12-07 RX ADMIN — SENNA PLUS 2 TABLET(S): 8.6 TABLET ORAL at 21:16

## 2019-12-07 RX ADMIN — Medication 0.5 MILLIGRAM(S): at 20:23

## 2019-12-07 RX ADMIN — MONTELUKAST 10 MILLIGRAM(S): 4 TABLET, CHEWABLE ORAL at 21:16

## 2019-12-07 RX ADMIN — ENOXAPARIN SODIUM 40 MILLIGRAM(S): 100 INJECTION SUBCUTANEOUS at 12:31

## 2019-12-07 RX ADMIN — Medication 10 MILLIEQUIVALENT(S): at 12:31

## 2019-12-07 NOTE — PROGRESS NOTE ADULT - SUBJECTIVE AND OBJECTIVE BOX
CARLEE LAKE    V 3WES 355 W1    Patient is a 59y old  Female who presents with a chief complaint of wheezing  coughing (06 Dec 2019 09:07)       Allergies    Colace (Unknown)  phenobarbital (Unknown)    Intolerances        HPI:  wheezing  coughing  leaves in group home (02 Dec 2019 14:41)      PAST MEDICAL & SURGICAL HISTORY:  Osteoporosis  CP (cerebral palsy)  Scoliosis  Mentally challenged  Epilepsy      FAMILY HISTORY:        MEDICATIONS   albuterol/ipratropium for Nebulization 3 milliLiter(s) Nebulizer every 6 hours  buDESOnide    Inhalation Suspension 0.5 milliGRAM(s) Inhalation two times a day  dextrose 40% Gel 15 Gram(s) Oral once PRN  dextrose 5%. 1000 milliLiter(s) IV Continuous <Continuous>  dextrose 5%. 1000 milliLiter(s) IV Continuous <Continuous>  dextrose 50% Injectable 12.5 Gram(s) IV Push once  dextrose 50% Injectable 25 Gram(s) IV Push once  dextrose 50% Injectable 25 Gram(s) IV Push once  diltiazem    milliGRAM(s) Oral daily  diVALproex Sprinkle 375 milliGRAM(s) Oral daily  diVALproex Sprinkle 125 milliGRAM(s) Oral daily  enoxaparin Injectable 40 milliGRAM(s) SubCutaneous daily  furosemide    Tablet 20 milliGRAM(s) Oral daily  glucagon  Injectable 1 milliGRAM(s) IntraMuscular once PRN  hydrocortisone sodium succinate Injectable 40 milliGRAM(s) IV Push daily  lactulose Syrup 10 Gram(s) Oral three times a day  latanoprost 0.005% Ophthalmic Solution 1 Drop(s) Both EYES at bedtime  LORazepam   Injectable 0.5 milliGRAM(s) IV Push every 4 hours PRN  montelukast 10 milliGRAM(s) Oral at bedtime  potassium chloride    Tablet ER 10 milliEquivalent(s) Oral daily  senna 2 Tablet(s) Oral at bedtime      Vital Signs Last 24 Hrs  T(C): 37.4 (07 Dec 2019 06:03), Max: 37.6 (06 Dec 2019 21:04)  T(F): 99.3 (07 Dec 2019 06:03), Max: 99.7 (06 Dec 2019 21:04)  HR: 94 (07 Dec 2019 06:03) (79 - 101)  BP: 105/72 (07 Dec 2019 06:03) (105/72 - 139/89)  BP(mean): --  RR: 18 (07 Dec 2019 06:03) (17 - 18)  SpO2: 94% (07 Dec 2019 06:03) (94% - 98%)      12-06-19 @ 07:01  -  12-07-19 @ 07:00  --------------------------------------------------------  IN: 450 mL / OUT: 450 mL / NET: 0 mL            LABS:                        12.8   10.36 )-----------( 249      ( 06 Dec 2019 08:20 )             39.2     12-06    148<H>  |  111<H>  |  10  ----------------------------<  101<H>  3.8   |  33<H>  |  0.42<L>    Ca    8.6      06 Dec 2019 08:20    TPro  5.9<L>  /  Alb  2.6<L>  /  TBili  0.4  /  DBili  x   /  AST  19  /  ALT  20  /  AlkPhos  55  12-06          ABG - ( 05 Dec 2019 14:58 )  pH, Arterial: 7.44  pH, Blood: x     /  pCO2: 48    /  pO2: 74    / HCO3: 30    / Base Excess: 7.1   /  SaO2: 94                  WBC:  WBC Count: 10.36 K/uL (12-06 @ 08:20)  WBC Count: 10.98 K/uL (12-05 @ 08:35)  WBC Count: 10.56 K/uL (12-04 @ 07:22)      MICROBIOLOGY:  RECENT CULTURES:  12-03 .Blood Blood XXXX XXXX   No growth to date.    12-02 .Urine Catheterized XXXX XXXX   No growth    12-02 .Blood Blood-Peripheral XXXX XXXX   No growth to date.                    Sodium:  Sodium, Serum: 148 mmol/L (12-06 @ 08:20)  Sodium, Serum: 147 mmol/L (12-05 @ 08:35)  Sodium, Serum: 145 mmol/L (12-04 @ 07:22)      0.42 mg/dL 12-06 @ 08:20  0.42 mg/dL 12-05 @ 08:35  0.57 mg/dL 12-04 @ 07:22      Hemoglobin:  Hemoglobin: 12.8 g/dL (12-06 @ 08:20)  Hemoglobin: 13.3 g/dL (12-05 @ 08:35)  Hemoglobin: 13.0 g/dL (12-04 @ 07:22)      Platelets: Platelet Count - Automated: 249 K/uL (12-06 @ 08:20)  Platelet Count - Automated: 238 K/uL (12-05 @ 08:35)  Platelet Count - Automated: 179 K/uL (12-04 @ 07:22)      LIVER FUNCTIONS - ( 06 Dec 2019 08:20 )  Alb: 2.6 g/dL / Pro: 5.9 g/dL / ALK PHOS: 55 U/L / ALT: 20 U/L / AST: 19 U/L / GGT: x                 RADIOLOGY & ADDITIONAL STUDIES:

## 2019-12-07 NOTE — PROGRESS NOTE ADULT - ATTENDING COMMENTS
I spoke to house nurse this morning possible dc tomorrow  she stated no one sick with uri/gastro sx
I spoke to mom and dad at length
ID will sign off  call with questions  [phone]

## 2019-12-07 NOTE — PROGRESS NOTE ADULT - PROBLEM SELECTOR PROBLEM 2
Constipation, unspecified constipation type
Pneumonia due to infectious organism, unspecified laterality, unspecified part of lung

## 2019-12-07 NOTE — PROGRESS NOTE ADULT - ASSESSMENT
cont rx cont rx    REVIEW OF SYMPTOMS      Able to give ROS  NO     PHYSICAL EXAM    HEENT Unremarkable PERRLA atraumatic   RESP Fair air entry EXP prolonged    Harsh breath sound Resp distres mild   CARDIAC S1 S2 No S3     NO JVD    ABDOMEN SOFT BS PRESENT NOT DISTENDED No hepatosplenomegaly PEDAL EDEMA present No calf tenderness  NO rash   GENERAL Not TOXIC looking    VITALS/LABS       2019 99f 96 108/67   2019 99f 101 107/71   2019 W 10.3 Hb 12.8 Plt 249 Na 148 K 3.8 CO2 33 Cr .4     JAYA BEJARANO [facility] P [phone]  1960 DOA 2019 [address] MODESTA RUBY   ALLERGY      colace phenobarb   CONTACT      mother Ada NEGRETE [phone]       PT DATA/BEST PRACTICE  ADVANCED DIRECTIVE       CODE STATUS: [ ] full code  [ ] DNR  [ ] DNI  [ ] MOLST  Goals of care discussion: [ ] yes   WT      60          BMI    24                                                                                                       HEAD OF BED ELEVATION Yes  DIET  Dysph 1 puree Honey ()   IV F       D5 40 (12/3-)    DVT PROPHYLAXIS lvnx 40 ()     PATIENT DESCRIPTION        CC              2019   From [facility] sob ro pneum             ER VS          2019  108/60 90 18 98%   ER MX                              HPI             2019  59 f from group home with cough sob                            PMH   cp cerebral palsy mental retardation epilepsy osteioporosis scolisosis          HOME MEDS     albuterol asa 325 pulmicort cardizem 300 depakote 125.3 feso4 fa lasix 20 levoxyl 25 loratadine 10 montelukast 10 prolia symbicort                                                  PULMONARY/CRITICAL CARE ASSESSMENT/RECOMMENDATIONS                      RESP TRACT INFECTION   12/3-12/3---2019 W 20-15-10.5-10.9-10.3  12/3-2019 PC .11-.11   -12/3 LA 2.3-.7    blod culture n    urine culture n    Flu AB n RSV n   Leg n  mrsa p n   2019 RVP parainfluenza 1 detected   2019 CXR r lung clear elevcated l diaphragm l lung clear s scoliosis   12/3/2019 CT ch l perihilar and lower lobe consolidation R perihilar gg infiltrate   ARNOL 12/3/2019 As w is decreasing and as pc is .11 and as cultures are neg agree with deferring abio If she gets worse will start on zosyn for bact superinfection       RESP GAS EXCHANGE  1l 744/48/74 Gas exchange acceptable   COPD  duoneb.4 () pulmicort () hydrocort 40.3d () montelukast 10 () Cont rx   A fib  ekg a f Cardizem cd 300 () On rate control rx   CHF Lasix 20 (12/3) KCL 10 (12/3) Compensated   NEURO PSYCH depakote 375 () plus depakote 125 () Cont                  PLAN  Parainfluenza 3 resp tract on supp Rx   2019 D5 50 started as Na elevated           TIME SPENT Over 25 minutes aggregate care time spent on encounter; activities included   direct pt care, counseling and/or coordinating care reviewing notes, lab data/ imaging , discussion with multidisciplinary team/ pt /family. Risks, benefits, alternatives  discussed in detail.

## 2019-12-07 NOTE — PROGRESS NOTE ADULT - SUBJECTIVE AND OBJECTIVE BOX
[facility], Division of Infectious Diseases  CHERRI Salas A. Lee  [phone]  Name: CARLEE LAKE  Age: 59y  Gender: Female  MRN: [id number]    Interval History--  Notes reviewed  says hi  cognitive dsyfunction, family at bedside    Past Medical History--  Osteoporosis  CP (cerebral palsy)  Scoliosis  Mentally challenged  Epilepsy      For details regarding the patient's social history, family history, and other miscellaneous elements, please refer the initial infectious diseases consultation and/or the admitting history and physical examination for this admission.    Allergies    Colace (Unknown)  phenobarbital (Unknown)    Intolerances        Medications--  Antibiotics:    Immunologic:    Other:  albuterol/ipratropium for Nebulization  buDESOnide    Inhalation Suspension  dextrose 40% Gel PRN  dextrose 5%.  dextrose 5%.  dextrose 50% Injectable  dextrose 50% Injectable  dextrose 50% Injectable  diltiazem   CD  diVALproex Sprinkle  diVALproex Sprinkle  enoxaparin Injectable  furosemide    Tablet  glucagon  Injectable PRN  hydrocortisone sodium succinate Injectable  lactulose Syrup  latanoprost 0.005% Ophthalmic Solution  LORazepam   Injectable PRN  montelukast  potassium chloride    Tablet ER  senna      Review of Systems--  A 10-point review of systems was obtained.   unable to obtain  Review of systems otherwise negative except as previously noted.    Physical Examination--  Vital Signs: T(F): 99.9 (12-07-19 @ 13:40), Max: 99.9 (12-07-19 @ 13:40)  HR: 80 (12-07-19 @ 14:23)  BP: 108/67 (12-07-19 @ 13:40)  RR: 18 (12-07-19 @ 13:40)  SpO2: 97% (12-07-19 @ 14:23)  Wt(kg): --  General: Nontoxic-appearing Female in no acute distress.  HEENT: AT/NC. . Anicteric. Conjunctiva pink and moist. Oropharynx clear. Dentition fair.  Neck: Not rigid. No sense of mass.  Nodes: None palpable.  Lungs: noncompliant with deep breath  Heart: Regular rate and rhythm. No Murmur. No rub  Abdomen: Bowel sounds present and normoactive. Soft. Nondistended.  Back: No spinal tenderness. No costovertebral angle tenderness.   Extremities: No cyanosis or clubbing. No edema.   Skin: Warm. Dry. Good turgor. No rash. No vasculitic stigmata.  Psychiatric: Appropriate affect and mood for situation.         Laboratory Studies--  CBC                        12.8   10.36 )-----------( 249      ( 06 Dec 2019 08:20 )             39.2       Chemistries  12-06    148<H>  |  111<H>  |  10  ----------------------------<  101<H>  3.8   |  33<H>  |  0.42<L>    Ca    8.6      06 Dec 2019 08:20    TPro  5.9<L>  /  Alb  2.6<L>  /  TBili  0.4  /  DBili  x   /  AST  19  /  ALT  20  /  AlkPhos  55  12-06      Culture Data    Culture - Blood (collected 03 Dec 2019 21:31)  Source: .Blood Blood  Preliminary Report (04 Dec 2019 22:01):    No growth to date.    Culture - Blood (collected 03 Dec 2019 21:31)  Source: .Blood Blood  Preliminary Report (04 Dec 2019 22:01):    No growth to date.    Culture - Urine (collected 02 Dec 2019 11:02)  Source: .Urine Catheterized  Final Report (03 Dec 2019 08:07):    No growth    Culture - Blood (collected 02 Dec 2019 11:00)  Source: .Blood Blood-Peripheral  Final Report (07 Dec 2019 12:00):    No growth at 5 days.    Culture - Blood (collected 02 Dec 2019 11:00)  Source: .Blood Blood-Peripheral  Final Report (07 Dec 2019 12:00):    No growth at 5 days.        < from: Xray Chest 1 View- PORTABLE-Routine (12.05.19 @ 10:53) >  EXAM:  XR CHEST PORTABLE ROUTINE 1V                            PROCEDURE DATE:  12/05/2019          INTERPRETATION:  Chest one view    HISTORY: Cough    COMPARISON STUDY: 12/2/2019    Frontal expiratory view of the chest shows the heart to be similar in   size. The lungs show left base atelectasis or infiltrate with similar   elevation of the left hemidiaphragm and there is no evidence of   pneumothorax nor pleural effusion.    IMPRESSION:  Possible left base infiltrate.    < end of copied text >

## 2019-12-08 LAB
ALBUMIN SERPL ELPH-MCNC: 2.6 G/DL — LOW (ref 3.3–5)
ALP SERPL-CCNC: 52 U/L — SIGNIFICANT CHANGE UP (ref 40–120)
ALT FLD-CCNC: 22 U/L — SIGNIFICANT CHANGE UP (ref 12–78)
ANION GAP SERPL CALC-SCNC: 5 MMOL/L — SIGNIFICANT CHANGE UP (ref 5–17)
AST SERPL-CCNC: 17 U/L — SIGNIFICANT CHANGE UP (ref 15–37)
BILIRUB SERPL-MCNC: 0.4 MG/DL — SIGNIFICANT CHANGE UP (ref 0.2–1.2)
BUN SERPL-MCNC: 8 MG/DL — SIGNIFICANT CHANGE UP (ref 7–23)
CALCIUM SERPL-MCNC: 9 MG/DL — SIGNIFICANT CHANGE UP (ref 8.5–10.1)
CHLORIDE SERPL-SCNC: 108 MMOL/L — SIGNIFICANT CHANGE UP (ref 96–108)
CO2 SERPL-SCNC: 31 MMOL/L — SIGNIFICANT CHANGE UP (ref 22–31)
CREAT SERPL-MCNC: 0.42 MG/DL — LOW (ref 0.5–1.3)
CULTURE RESULTS: SIGNIFICANT CHANGE UP
CULTURE RESULTS: SIGNIFICANT CHANGE UP
GLUCOSE SERPL-MCNC: 85 MG/DL — SIGNIFICANT CHANGE UP (ref 70–99)
HCT VFR BLD CALC: 40.2 % — SIGNIFICANT CHANGE UP (ref 34.5–45)
HGB BLD-MCNC: 13.1 G/DL — SIGNIFICANT CHANGE UP (ref 11.5–15.5)
MCHC RBC-ENTMCNC: 32.6 GM/DL — SIGNIFICANT CHANGE UP (ref 32–36)
MCHC RBC-ENTMCNC: 33.3 PG — SIGNIFICANT CHANGE UP (ref 27–34)
MCV RBC AUTO: 102.3 FL — HIGH (ref 80–100)
NRBC # BLD: 0 /100 WBCS — SIGNIFICANT CHANGE UP (ref 0–0)
PLATELET # BLD AUTO: 392 K/UL — SIGNIFICANT CHANGE UP (ref 150–400)
POTASSIUM SERPL-MCNC: 4.4 MMOL/L — SIGNIFICANT CHANGE UP (ref 3.5–5.3)
POTASSIUM SERPL-SCNC: 4.4 MMOL/L — SIGNIFICANT CHANGE UP (ref 3.5–5.3)
PROT SERPL-MCNC: 6.1 G/DL — SIGNIFICANT CHANGE UP (ref 6–8.3)
RBC # BLD: 3.93 M/UL — SIGNIFICANT CHANGE UP (ref 3.8–5.2)
RBC # FLD: 15.2 % — HIGH (ref 10.3–14.5)
SODIUM SERPL-SCNC: 144 MMOL/L — SIGNIFICANT CHANGE UP (ref 135–145)
SPECIMEN SOURCE: SIGNIFICANT CHANGE UP
SPECIMEN SOURCE: SIGNIFICANT CHANGE UP
WBC # BLD: 13.21 K/UL — HIGH (ref 3.8–10.5)
WBC # FLD AUTO: 13.21 K/UL — HIGH (ref 3.8–10.5)

## 2019-12-08 RX ORDER — MULTIVIT-MIN/FERROUS GLUCONATE 9 MG/15 ML
1 LIQUID (ML) ORAL DAILY
Refills: 0 | Status: CANCELLED | OUTPATIENT
Start: 2019-12-08 | End: 2019-12-09

## 2019-12-08 RX ORDER — IBUPROFEN 200 MG
400 TABLET ORAL
Refills: 0 | Status: COMPLETED | OUTPATIENT
Start: 2019-12-08 | End: 2019-12-09

## 2019-12-08 RX ORDER — ASCORBIC ACID 60 MG
500 TABLET,CHEWABLE ORAL
Refills: 0 | Status: CANCELLED | OUTPATIENT
Start: 2019-12-08 | End: 2019-12-09

## 2019-12-08 RX ADMIN — MONTELUKAST 10 MILLIGRAM(S): 4 TABLET, CHEWABLE ORAL at 22:01

## 2019-12-08 RX ADMIN — Medication 3 MILLILITER(S): at 02:14

## 2019-12-08 RX ADMIN — ENOXAPARIN SODIUM 40 MILLIGRAM(S): 100 INJECTION SUBCUTANEOUS at 12:00

## 2019-12-08 RX ADMIN — Medication 3 MILLILITER(S): at 19:27

## 2019-12-08 RX ADMIN — LACTULOSE 10 GRAM(S): 10 SOLUTION ORAL at 13:20

## 2019-12-08 RX ADMIN — Medication 10 MILLIEQUIVALENT(S): at 12:35

## 2019-12-08 RX ADMIN — LACTULOSE 10 GRAM(S): 10 SOLUTION ORAL at 22:01

## 2019-12-08 RX ADMIN — SODIUM CHLORIDE 50 MILLILITER(S): 9 INJECTION, SOLUTION INTRAVENOUS at 12:39

## 2019-12-08 RX ADMIN — SENNA PLUS 2 TABLET(S): 8.6 TABLET ORAL at 22:01

## 2019-12-08 RX ADMIN — Medication 3 MILLILITER(S): at 14:05

## 2019-12-08 RX ADMIN — Medication 20 MILLIGRAM(S): at 06:20

## 2019-12-08 RX ADMIN — LATANOPROST 1 DROP(S): 0.05 SOLUTION/ DROPS OPHTHALMIC; TOPICAL at 22:02

## 2019-12-08 RX ADMIN — Medication 0.5 MILLIGRAM(S): at 19:27

## 2019-12-08 RX ADMIN — LACTULOSE 10 GRAM(S): 10 SOLUTION ORAL at 06:20

## 2019-12-08 RX ADMIN — DIVALPROEX SODIUM 375 MILLIGRAM(S): 500 TABLET, DELAYED RELEASE ORAL at 22:01

## 2019-12-08 RX ADMIN — Medication 400 MILLIGRAM(S): at 17:34

## 2019-12-08 RX ADMIN — Medication 3 MILLILITER(S): at 07:38

## 2019-12-08 RX ADMIN — Medication 300 MILLIGRAM(S): at 06:20

## 2019-12-08 RX ADMIN — Medication 40 MILLIGRAM(S): at 06:17

## 2019-12-08 RX ADMIN — Medication 0.5 MILLIGRAM(S): at 07:38

## 2019-12-08 RX ADMIN — Medication 400 MILLIGRAM(S): at 18:22

## 2019-12-08 RX ADMIN — DIVALPROEX SODIUM 125 MILLIGRAM(S): 500 TABLET, DELAYED RELEASE ORAL at 07:35

## 2019-12-08 NOTE — PROGRESS NOTE ADULT - ASSESSMENT
cont rx cont rx    REVIEW OF SYMPTOMS      Able to give ROS  NO     PHYSICAL EXAM    HEENT Unremarkable PERRLA atraumatic   RESP Fair air entry EXP prolonged    Harsh breath sound Resp distres mild   CARDIAC S1 S2 No S3     NO JVD    ABDOMEN SOFT BS PRESENT NOT DISTENDED No hepatosplenomegaly PEDAL EDEMA present No calf tenderness  NO rash   GENERAL Not TOXIC looking    VITALS/LABS      2019 99f 99 118/70   2019 W 13.2 Hb 13.1 Plt 392 Na 144 K 4.4 CO2 31 Cr .42      JAYA CARLEE [facility] P [phone]  1960 DOA 2019 [address] MODESTA RUBY   ALLERGY      colace phenobarb   CONTACT      mother Ada NEGRETE [phone]       PT DATA/BEST PRACTICE  ADVANCED DIRECTIVE       CODE STATUS: [x ] full code  [ ] DNR  [ ] DNI  [ ] MOLST  Goals of care discussion: [ ] yes   WT      60          BMI    24                                                                                                       HEAD OF BED ELEVATION Yes  DIET  Dysph 1 puree Honey ()   IV F       D5 40 (12/3-)    DVT PROPHYLAXIS lvnx 40 ()     PATIENT DESCRIPTION        CC              2019   From [facility] sob ro pneum             ER VS          2019  108/60 90 18 98%   ER MX                              HPI             2019  59 f from group home with cough sob                            PMH   cp cerebral palsy mental retardation epilepsy osteioporosis scolisosis          HOME MEDS     albuterol asa 325 pulmicort cardizem 300 depakote 125.3 feso4 fa lasix 20 levoxyl 25 loratadine 10 montelukast 10 prolia symbicort                                                  PULMONARY/CRITICAL CARE ASSESSMENT/RECOMMENDATIONS                      RESP TRACT INFECTION   12/3-12/3----2019 W 20-15-10.5-10.9-10.3-13.2   12/3-2019 PC .11-.11   -12/3 LA 2.3-.7    blod culture n    urine culture n    Flu AB n RSV n   Leg n  mrsa p n   2019 RVP parainfluenza 1 detected   2019 CXR r lung clear elevcated l diaphragm l lung clear s scoliosis   12/3/2019 CT ch l perihilar and lower lobe consolidation R perihilar gg infiltrate   ARNOL 12/3/2019 As w ws decreasing and as pc is .11 and as cultures are neg it was decided to defer abio If she gets worse will start on zosyn for bact superinfection       RESP GAS EXCHANGE  1l 744/48/74 Gas exchange acceptable   COPD  duoneb.4 () pulmicort () hydrocort 40.3d () montelukast 10 () Cont rx   A fib  ekg a f Cardizem cd 300 () On rate control rx   CHF Lasix 20 (12/3) KCL 10 (12/3) Compensated   NEURO PSYCH depakote 375 () plus depakote 125 () Cont       PLAN  Parainfluenza 3 resp tract on supp Rx   2019 D5 50 started as Na elevated         TIME SPENT Over 25 minutes aggregate care time spent on encounter; activities included   direct pt care, counseling and/or coordinating care reviewing notes, lab data/ imaging , discussion with multidisciplinary team/ pt /family. Risks, benefits, alternatives  discussed in detail.

## 2019-12-08 NOTE — PROGRESS NOTE ADULT - SUBJECTIVE AND OBJECTIVE BOX
PAST MEDICAL & SURGICAL HISTORY:  Osteoporosis  CP (cerebral palsy)  Scoliosis  Mentally challenged  Epilepsy      MEDICATIONS  (STANDING):  albuterol/ipratropium for Nebulization 3 milliLiter(s) Nebulizer every 6 hours  buDESOnide    Inhalation Suspension 0.5 milliGRAM(s) Inhalation two times a day  dextrose 5%. 1000 milliLiter(s) (50 mL/Hr) IV Continuous <Continuous>  dextrose 50% Injectable 12.5 Gram(s) IV Push once  dextrose 50% Injectable 25 Gram(s) IV Push once  dextrose 50% Injectable 25 Gram(s) IV Push once  diltiazem    milliGRAM(s) Oral daily  diVALproex Sprinkle 375 milliGRAM(s) Oral daily  diVALproex Sprinkle 125 milliGRAM(s) Oral daily  enoxaparin Injectable 40 milliGRAM(s) SubCutaneous daily  furosemide    Tablet 20 milliGRAM(s) Oral daily  ibuprofen  Suspension. 400 milliGRAM(s) Oral two times a day  lactulose Syrup 10 Gram(s) Oral three times a day  latanoprost 0.005% Ophthalmic Solution 1 Drop(s) Both EYES at bedtime  montelukast 10 milliGRAM(s) Oral at bedtime  potassium chloride    Tablet ER 10 milliEquivalent(s) Oral daily  senna 2 Tablet(s) Oral at bedtime    MEDICATIONS  (PRN):  dextrose 40% Gel 15 Gram(s) Oral once PRN Blood Glucose LESS THAN 70 milliGRAM(s)/deciliter  LORazepam   Injectable 0.5 milliGRAM(s) IV Push every 4 hours PRN for acute seizure      Patient is a 59y old  Female who presents with a chief complaint of wheezing  coughing (08 Dec 2019 12:45)      Vital Signs Last 24 Hrs  T(C): 37.2 (08 Dec 2019 13:55), Max: 37.7 (08 Dec 2019 06:10)  T(F): 99 (08 Dec 2019 13:55), Max: 99.9 (08 Dec 2019 06:10)  HR: 85 (08 Dec 2019 13:55) (70 - 99)  BP: 94/61 (08 Dec 2019 13:55) (94/61 - 118/70)  BP(mean): --  RR: 18 (08 Dec 2019 13:55) (18 - 18)  SpO2: 95% (08 Dec 2019 13:55) (95% - 100%)          12-07 @ 07:01  -  12-08 @ 07:00  --------------------------------------------------------  IN: 1200 mL / OUT: 200 mL / NET: 1000 mL    12-08 @ 07:01  -  12-08 @ 15:32  --------------------------------------------------------  IN: 300 mL / OUT: 0 mL / NET: 300 mL        REVIEW OF SYSTEMS:    Constitutional: No fever, weight loss or fatigue  Eyes: No eye pain, visual disturbances, or discharge  ENT:  No difficulty hearing, tinnitus, vertigo; No sinus or throat pain  Neck: No pain or stiffness  Breasts: No pain, masses or nipple discharge  Respiratory: No cough, wheezing, chills or hemoptysis  Cardiovascular: No chest pain, palpitations, shortness of breath, dizziness or leg swelling  Gastrointestinal: No abdominal or epigastric pain. No nausea, vomiting or hematemesis; No diarrhea or constipation. No melena or hematochezia.  Genitourinary: No dysuria, frequency, hematuria or incontinence  Rectal: No pain, hemorrhoids or incontinence  Neurological: No headaches, memory loss, loss of strength, numbness or tremors  Skin: No itching, burning, rashes or lesions   Lymph Nodes: No enlarged glands  Endocrine: No heat or cold intolerance; No hair loss  Musculoskeletal: No joint pain or swelling; No muscle, back or extremity pain  Psychiatric: No depression, anxiety, mood swings or difficulty sleeping  Heme/Lymph: No easy bruising or bleeding gums  Allergy and Immunologic: No hives or eczema    PHYSICAL EXAM:  parents at the bedside  patient flipping pages on her magazine  Constitutional: NAD,  Respiratory: CTAB/L   Cardiovascular: S1 and S2, RRR, no M/G/R  Gastrointestinal: BS+, soft, NT/ND  Extremities: No peripheral edema  Neurological: A, no focal deficits  Skin: No rashes      decubiti: none                          13.1   13.21 )-----------( 392      ( 08 Dec 2019 07:31 )             40.2     12-08    144  |  108  |  8   ----------------------------<  85  4.4   |  31  |  0.42<L>    Ca    9.0      08 Dec 2019 07:31    TPro  6.1  /  Alb  2.6<L>  /  TBili  0.4  /  DBili  x   /  AST  17  /  ALT  22  /  AlkPhos  52  12-08              .Blood Blood  12-03 @ 21:31   No growth to date.  --  --      .Urine Catheterized  12-02 @ 11:02   No growth  --  --      .Blood Blood-Peripheral  12-02 @ 11:00   No growth at 5 days.  --  --        Urine Culture:  12-03 @ 21:31    --        No growth to date.  Urine Culture:  12-02 @ 11:02    --        No growth  Urine Culture:  12-02 @ 11:00    --        No growth at 5 days.        Radiology:

## 2019-12-08 NOTE — CHART NOTE - NSCHARTNOTEFT_GEN_A_CORE
Assessment: 60 y/o female adm with pneumonia. PMH CP, osteoporosis, scoliosis, epilepsy. Pt nonverbal, sleeping at time of visit this am. Pt tolerating diet with varied po intake as per EMR.     Factors impacting intake: [ ] none [ ] nausea  [ ] vomiting [ ] diarrhea [ ] constipation  [ ]chewing problems [ ] swallowing issues  [x ] other: po intake noted to vary     Diet Presciption: Diet, Dysphagia 1 Pureed-Honey Consistency Fluid (12-02-19 @ 14:41)    Intake: %    Current Weight: 12/2 132.2#  % Weight Change    Pertinent Medications: MEDICATIONS  (STANDING):  albuterol/ipratropium for Nebulization 3 milliLiter(s) Nebulizer every 6 hours  buDESOnide    Inhalation Suspension 0.5 milliGRAM(s) Inhalation two times a day  dextrose 5%. 1000 milliLiter(s) (50 mL/Hr) IV Continuous <Continuous>  dextrose 5%. 1000 milliLiter(s) (50 mL/Hr) IV Continuous <Continuous>  dextrose 50% Injectable 12.5 Gram(s) IV Push once  dextrose 50% Injectable 25 Gram(s) IV Push once  dextrose 50% Injectable 25 Gram(s) IV Push once  diltiazem    milliGRAM(s) Oral daily  diVALproex Sprinkle 375 milliGRAM(s) Oral daily  diVALproex Sprinkle 125 milliGRAM(s) Oral daily  enoxaparin Injectable 40 milliGRAM(s) SubCutaneous daily  furosemide    Tablet 20 milliGRAM(s) Oral daily  lactulose Syrup 10 Gram(s) Oral three times a day  latanoprost 0.005% Ophthalmic Solution 1 Drop(s) Both EYES at bedtime  montelukast 10 milliGRAM(s) Oral at bedtime  potassium chloride    Tablet ER 10 milliEquivalent(s) Oral daily  senna 2 Tablet(s) Oral at bedtime    MEDICATIONS  (PRN):  dextrose 40% Gel 15 Gram(s) Oral once PRN Blood Glucose LESS THAN 70 milliGRAM(s)/deciliter  glucagon  Injectable 1 milliGRAM(s) IntraMuscular once PRN Glucose LESS THAN 70 milligrams/deciliter  LORazepam   Injectable 0.5 milliGRAM(s) IV Push every 4 hours PRN for acute seizure    Pertinent Labs: 12-08 Na144 mmol/L Glu 85 mg/dL K+ 4.4 mmol/L Cr  0.42 mg/dL<L> BUN 8 mg/dL 12-08 Alb 2.6 g/dL<L> 12-03 [id number] 5.2 %     CAPILLARY BLOOD GLUCOSE        Skin: left/right heel DTI    Estimated Needs:   [x ] no change since previous assessment  [ ] recalculated:     Previous Nutrition Diagnosis:   [ ] Inadequate Energy Intake [ ]Inadequate Oral Intake [ ] Excessive Energy Intake   [ ] Underweight [ ] Increased Nutrient Needs [ ] Overweight/Obesity   [ ] Altered GI Function [ ] Unintended Weight Loss [ ] Food & Nutrition Related Knowledge Deficit [ ] Malnutrition   [x] swallowing     Nutrition Diagnosis is [x ] ongoing  [ ] resolved [ ] not applicable     New Nutrition Diagnosis: [ ] not applicable   [x] increased nutrient needs    Interventions:   Recommend  [ ] Change Diet To:  [x ] Nutrition Supplement: rx MVI daily along with vit C 500 mg BID to help promote wound healing.   [ ] Nutrition Support  [ ] Other:     Monitoring and Evaluation:   [x ] PO intake [ x ] Tolerance to diet prescription [ x ] weights [ x ] labs[ x ] follow up per protocol  [ ] other:

## 2019-12-08 NOTE — PROGRESS NOTE ADULT - SUBJECTIVE AND OBJECTIVE BOX
[place] [place]    V 3WES 355 W1    Patient is a 59y old  Female who presents with a chief complaint of wheezing  coughing (07 Dec 2019 16:34)       Allergies    Colace (Unknown)  phenobarbital (Unknown)    Intolerances        HPI:  wheezing  coughing  leaves in group home (02 Dec 2019 14:41)      PAST MEDICAL & SURGICAL HISTORY:  Osteoporosis  CP (cerebral palsy)  Scoliosis  Mentally challenged  Epilepsy      FAMILY HISTORY:        MEDICATIONS   albuterol/ipratropium for Nebulization 3 milliLiter(s) Nebulizer every 6 hours  buDESOnide    Inhalation Suspension 0.5 milliGRAM(s) Inhalation two times a day  dextrose 40% Gel 15 Gram(s) Oral once PRN  dextrose 5%. 1000 milliLiter(s) IV Continuous <Continuous>  dextrose 5%. 1000 milliLiter(s) IV Continuous <Continuous>  dextrose 50% Injectable 12.5 Gram(s) IV Push once  dextrose 50% Injectable 25 Gram(s) IV Push once  dextrose 50% Injectable 25 Gram(s) IV Push once  diltiazem    milliGRAM(s) Oral daily  diVALproex Sprinkle 375 milliGRAM(s) Oral daily  diVALproex Sprinkle 125 milliGRAM(s) Oral daily  enoxaparin Injectable 40 milliGRAM(s) SubCutaneous daily  furosemide    Tablet 20 milliGRAM(s) Oral daily  glucagon  Injectable 1 milliGRAM(s) IntraMuscular once PRN  lactulose Syrup 10 Gram(s) Oral three times a day  latanoprost 0.005% Ophthalmic Solution 1 Drop(s) Both EYES at bedtime  LORazepam   Injectable 0.5 milliGRAM(s) IV Push every 4 hours PRN  montelukast 10 milliGRAM(s) Oral at bedtime  potassium chloride    Tablet ER 10 milliEquivalent(s) Oral daily  senna 2 Tablet(s) Oral at bedtime      Vital Signs Last 24 Hrs  T(C): 37.7 (08 Dec 2019 06:10), Max: 37.7 (07 Dec 2019 13:40)  T(F): 99.9 (08 Dec 2019 06:10), Max: 99.9 (07 Dec 2019 13:40)  HR: 78 (08 Dec 2019 07:38) (70 - 99)  BP: 118/70 (08 Dec 2019 06:10) (108/67 - 118/70)  BP(mean): --  RR: 18 (08 Dec 2019 06:10) (18 - 18)  SpO2: 95% (08 Dec 2019 07:38) (94% - 100%)      12-07-19 @ 07:01  -  12-08-19 @ 07:00  --------------------------------------------------------  IN: 1200 mL / OUT: 200 mL / NET: 1000 mL    12-08-19 @ 07:01  -  12-08-19 @ 12:45  --------------------------------------------------------  IN: 150 mL / OUT: 0 mL / NET: 150 mL            LABS:                        13.1   13.21 )-----------( 392      ( 08 Dec 2019 07:31 )             40.2     12-08    144  |  108  |  8   ----------------------------<  85  4.4   |  31  |  0.42<L>    Ca    9.0      08 Dec 2019 07:31    TPro  6.1  /  Alb  2.6<L>  /  TBili  0.4  /  DBili  x   /  AST  17  /  ALT  22  /  AlkPhos  52  12-08              WBC:  WBC Count: 13.21 K/uL (12-08 @ 07:31)  WBC Count: 10.36 K/uL (12-06 @ 08:20)  WBC Count: 10.98 K/uL (12-05 @ 08:35)      MICROBIOLOGY:  RECENT CULTURES:  12-03 .Blood Blood XXXX XXXX   No growth to date.    12-02 .Urine Catheterized XXXX XXXX   No growth    12-02 .Blood Blood-Peripheral XXXX XXXX   No growth at 5 days.                    Sodium:  Sodium, Serum: 144 mmol/L (12-08 @ 07:31)  Sodium, Serum: 148 mmol/L (12-06 @ 08:20)  Sodium, Serum: 147 mmol/L (12-05 @ 08:35)      0.42 mg/dL 12-08 @ 07:31  0.42 mg/dL 12-06 @ 08:20  0.42 mg/dL 12-05 @ 08:35      Hemoglobin:  Hemoglobin: 13.1 g/dL (12-08 @ 07:31)  Hemoglobin: 12.8 g/dL (12-06 @ 08:20)  Hemoglobin: 13.3 g/dL (12-05 @ 08:35)      Platelets: Platelet Count - Automated: 392 K/uL (12-08 @ 07:31)  Platelet Count - Automated: 249 K/uL (12-06 @ 08:20)  Platelet Count - Automated: 238 K/uL (12-05 @ 08:35)      LIVER FUNCTIONS - ( 08 Dec 2019 07:31 )  Alb: 2.6 g/dL / Pro: 6.1 g/dL / ALK PHOS: 52 U/L / ALT: 22 U/L / AST: 17 U/L / GGT: x                 RADIOLOGY & ADDITIONAL STUDIES:

## 2019-12-08 NOTE — PROGRESS NOTE ADULT - PROBLEM SELECTOR PLAN 1
continue supportive care  dc home tomorrow  will switch to prednisone 20mg qd  fu office monday
dc iv antabiotics  continue solumedrol  continue nebulizers  wbc improved  possible dc tomorrow  eating well  decrease o2 to 1 litre  on 3 litres 96 percent
high risk for aspiration pn  lives in group home  ID eval  temp 99.8 this morning  continue solumedrol few more days  cxr pending  still reqiuires oxygen 2-3litres
with viral pn  afebrile  eating well  good bms  seen by pulmonary and Id  pulse ox off oxygen
cont supportive care

## 2019-12-09 ENCOUNTER — TRANSCRIPTION ENCOUNTER (OUTPATIENT)
Age: 59
End: 2019-12-09

## 2019-12-09 VITALS
TEMPERATURE: 100 F | DIASTOLIC BLOOD PRESSURE: 67 MMHG | SYSTOLIC BLOOD PRESSURE: 123 MMHG | HEART RATE: 95 BPM | OXYGEN SATURATION: 95 % | RESPIRATION RATE: 18 BRPM

## 2019-12-09 LAB
ALBUMIN SERPL ELPH-MCNC: 2.9 G/DL — LOW (ref 3.3–5)
ALP SERPL-CCNC: 53 U/L — SIGNIFICANT CHANGE UP (ref 40–120)
ALT FLD-CCNC: 29 U/L — SIGNIFICANT CHANGE UP (ref 12–78)
ANION GAP SERPL CALC-SCNC: 3 MMOL/L — LOW (ref 5–17)
ANISOCYTOSIS BLD QL: SLIGHT — SIGNIFICANT CHANGE UP
AST SERPL-CCNC: 23 U/L — SIGNIFICANT CHANGE UP (ref 15–37)
BASOPHILS # BLD AUTO: 0.11 K/UL — SIGNIFICANT CHANGE UP (ref 0–0.2)
BASOPHILS NFR BLD AUTO: 1 % — SIGNIFICANT CHANGE UP (ref 0–2)
BILIRUB SERPL-MCNC: 0.4 MG/DL — SIGNIFICANT CHANGE UP (ref 0.2–1.2)
BUN SERPL-MCNC: 15 MG/DL — SIGNIFICANT CHANGE UP (ref 7–23)
CALCIUM SERPL-MCNC: 9.2 MG/DL — SIGNIFICANT CHANGE UP (ref 8.5–10.1)
CHLORIDE SERPL-SCNC: 108 MMOL/L — SIGNIFICANT CHANGE UP (ref 96–108)
CO2 SERPL-SCNC: 34 MMOL/L — HIGH (ref 22–31)
CREAT SERPL-MCNC: 0.46 MG/DL — LOW (ref 0.5–1.3)
EOSINOPHIL # BLD AUTO: 0.34 K/UL — SIGNIFICANT CHANGE UP (ref 0–0.5)
EOSINOPHIL NFR BLD AUTO: 3 % — SIGNIFICANT CHANGE UP (ref 0–6)
GLUCOSE SERPL-MCNC: 99 MG/DL — SIGNIFICANT CHANGE UP (ref 70–99)
HCT VFR BLD CALC: 40.5 % — SIGNIFICANT CHANGE UP (ref 34.5–45)
HGB BLD-MCNC: 13.2 G/DL — SIGNIFICANT CHANGE UP (ref 11.5–15.5)
LYMPHOCYTES # BLD AUTO: 4.95 K/UL — HIGH (ref 1–3.3)
LYMPHOCYTES # BLD AUTO: 44 % — SIGNIFICANT CHANGE UP (ref 13–44)
MANUAL SMEAR VERIFICATION: SIGNIFICANT CHANGE UP
MCHC RBC-ENTMCNC: 32.6 GM/DL — SIGNIFICANT CHANGE UP (ref 32–36)
MCHC RBC-ENTMCNC: 33.4 PG — SIGNIFICANT CHANGE UP (ref 27–34)
MCV RBC AUTO: 102.5 FL — HIGH (ref 80–100)
MONOCYTES # BLD AUTO: 1.91 K/UL — HIGH (ref 0–0.9)
MONOCYTES NFR BLD AUTO: 17 % — HIGH (ref 2–14)
NEUTROPHILS # BLD AUTO: 3.6 K/UL — SIGNIFICANT CHANGE UP (ref 1.8–7.4)
NEUTROPHILS NFR BLD AUTO: 32 % — LOW (ref 43–77)
NRBC # BLD: 0 — SIGNIFICANT CHANGE UP
NRBC # BLD: SIGNIFICANT CHANGE UP /100 WBCS (ref 0–0)
OVALOCYTES BLD QL SMEAR: SLIGHT — SIGNIFICANT CHANGE UP
PLAT MORPH BLD: NORMAL — SIGNIFICANT CHANGE UP
PLATELET # BLD AUTO: 437 K/UL — HIGH (ref 150–400)
POIKILOCYTOSIS BLD QL AUTO: SLIGHT — SIGNIFICANT CHANGE UP
POLYCHROMASIA BLD QL SMEAR: SLIGHT — SIGNIFICANT CHANGE UP
POTASSIUM SERPL-MCNC: 4.3 MMOL/L — SIGNIFICANT CHANGE UP (ref 3.5–5.3)
POTASSIUM SERPL-SCNC: 4.3 MMOL/L — SIGNIFICANT CHANGE UP (ref 3.5–5.3)
PROT SERPL-MCNC: 6.5 G/DL — SIGNIFICANT CHANGE UP (ref 6–8.3)
RBC # BLD: 3.95 M/UL — SIGNIFICANT CHANGE UP (ref 3.8–5.2)
RBC # FLD: 15.2 % — HIGH (ref 10.3–14.5)
RBC BLD AUTO: ABNORMAL
SODIUM SERPL-SCNC: 145 MMOL/L — SIGNIFICANT CHANGE UP (ref 135–145)
STOMATOCYTES BLD QL SMEAR: SLIGHT — SIGNIFICANT CHANGE UP
VARIANT LYMPHS # BLD: 3 % — SIGNIFICANT CHANGE UP (ref 0–6)
WBC # BLD: 11.25 K/UL — HIGH (ref 3.8–10.5)
WBC # FLD AUTO: 11.25 K/UL — HIGH (ref 3.8–10.5)

## 2019-12-09 PROCEDURE — 82962 GLUCOSE BLOOD TEST: CPT

## 2019-12-09 PROCEDURE — 96361 HYDRATE IV INFUSION ADD-ON: CPT

## 2019-12-09 PROCEDURE — 87631 RESP VIRUS 3-5 TARGETS: CPT

## 2019-12-09 PROCEDURE — 84443 ASSAY THYROID STIM HORMONE: CPT

## 2019-12-09 PROCEDURE — 87449 NOS EACH ORGANISM AG IA: CPT

## 2019-12-09 PROCEDURE — 82803 BLOOD GASES ANY COMBINATION: CPT

## 2019-12-09 PROCEDURE — 87640 STAPH A DNA AMP PROBE: CPT

## 2019-12-09 PROCEDURE — 87581 M.PNEUMON DNA AMP PROBE: CPT

## 2019-12-09 PROCEDURE — 87641 MR-STAPH DNA AMP PROBE: CPT

## 2019-12-09 PROCEDURE — 87086 URINE CULTURE/COLONY COUNT: CPT

## 2019-12-09 PROCEDURE — 81001 URINALYSIS AUTO W/SCOPE: CPT

## 2019-12-09 PROCEDURE — 82607 VITAMIN B-12: CPT

## 2019-12-09 PROCEDURE — 85610 PROTHROMBIN TIME: CPT

## 2019-12-09 PROCEDURE — 84145 PROCALCITONIN (PCT): CPT

## 2019-12-09 PROCEDURE — 83735 ASSAY OF MAGNESIUM: CPT

## 2019-12-09 PROCEDURE — 71045 X-RAY EXAM CHEST 1 VIEW: CPT | Mod: 26

## 2019-12-09 PROCEDURE — 87798 DETECT AGENT NOS DNA AMP: CPT

## 2019-12-09 PROCEDURE — 87486 CHLMYD PNEUM DNA AMP PROBE: CPT

## 2019-12-09 PROCEDURE — 80164 ASSAY DIPROPYLACETIC ACD TOT: CPT

## 2019-12-09 PROCEDURE — 84484 ASSAY OF TROPONIN QUANT: CPT

## 2019-12-09 PROCEDURE — 87040 BLOOD CULTURE FOR BACTERIA: CPT

## 2019-12-09 PROCEDURE — 85730 THROMBOPLASTIN TIME PARTIAL: CPT

## 2019-12-09 PROCEDURE — 71045 X-RAY EXAM CHEST 1 VIEW: CPT

## 2019-12-09 PROCEDURE — 80053 COMPREHEN METABOLIC PANEL: CPT

## 2019-12-09 PROCEDURE — 85027 COMPLETE CBC AUTOMATED: CPT

## 2019-12-09 PROCEDURE — 83880 ASSAY OF NATRIURETIC PEPTIDE: CPT

## 2019-12-09 PROCEDURE — 86803 HEPATITIS C AB TEST: CPT

## 2019-12-09 PROCEDURE — 96365 THER/PROPH/DIAG IV INF INIT: CPT

## 2019-12-09 PROCEDURE — 83605 ASSAY OF LACTIC ACID: CPT

## 2019-12-09 PROCEDURE — 82553 CREATINE MB FRACTION: CPT

## 2019-12-09 PROCEDURE — 87633 RESP VIRUS 12-25 TARGETS: CPT

## 2019-12-09 PROCEDURE — 83036 HEMOGLOBIN GLYCOSYLATED A1C: CPT

## 2019-12-09 PROCEDURE — 87899 AGENT NOS ASSAY W/OPTIC: CPT

## 2019-12-09 PROCEDURE — 99285 EMERGENCY DEPT VISIT HI MDM: CPT | Mod: 25

## 2019-12-09 PROCEDURE — 71250 CT THORAX DX C-: CPT

## 2019-12-09 PROCEDURE — 36415 COLL VENOUS BLD VENIPUNCTURE: CPT

## 2019-12-09 PROCEDURE — 94640 AIRWAY INHALATION TREATMENT: CPT

## 2019-12-09 PROCEDURE — 93005 ELECTROCARDIOGRAM TRACING: CPT

## 2019-12-09 PROCEDURE — 94760 N-INVAS EAR/PLS OXIMETRY 1: CPT

## 2019-12-09 RX ORDER — ASCORBIC ACID 60 MG
1 TABLET,CHEWABLE ORAL
Qty: 0 | Refills: 0 | DISCHARGE
Start: 2019-12-09

## 2019-12-09 RX ORDER — IPRATROPIUM/ALBUTEROL SULFATE 18-103MCG
3 AEROSOL WITH ADAPTER (GRAM) INHALATION
Qty: 0 | Refills: 0 | DISCHARGE
Start: 2019-12-09

## 2019-12-09 RX ORDER — SENNA PLUS 8.6 MG/1
2 TABLET ORAL
Qty: 0 | Refills: 0 | DISCHARGE
Start: 2019-12-09

## 2019-12-09 RX ORDER — LACTULOSE 10 G/15ML
15 SOLUTION ORAL
Qty: 0 | Refills: 0 | DISCHARGE
Start: 2019-12-09

## 2019-12-09 RX ORDER — MULTIVIT-MIN/FERROUS GLUCONATE 9 MG/15 ML
1 LIQUID (ML) ORAL
Qty: 0 | Refills: 0 | DISCHARGE
Start: 2019-12-09

## 2019-12-09 RX ADMIN — Medication 3 MILLILITER(S): at 00:21

## 2019-12-09 RX ADMIN — Medication 300 MILLIGRAM(S): at 05:44

## 2019-12-09 RX ADMIN — Medication 20 MILLIGRAM(S): at 05:44

## 2019-12-09 RX ADMIN — Medication 0.5 MILLIGRAM(S): at 07:55

## 2019-12-09 RX ADMIN — Medication 10 MILLIEQUIVALENT(S): at 12:28

## 2019-12-09 RX ADMIN — Medication 10 MILLIGRAM(S): at 09:40

## 2019-12-09 RX ADMIN — LACTULOSE 10 GRAM(S): 10 SOLUTION ORAL at 05:43

## 2019-12-09 RX ADMIN — Medication 3 MILLILITER(S): at 13:41

## 2019-12-09 RX ADMIN — Medication 3 MILLILITER(S): at 07:55

## 2019-12-09 RX ADMIN — DIVALPROEX SODIUM 125 MILLIGRAM(S): 500 TABLET, DELAYED RELEASE ORAL at 05:44

## 2019-12-09 RX ADMIN — Medication 400 MILLIGRAM(S): at 06:15

## 2019-12-09 RX ADMIN — Medication 400 MILLIGRAM(S): at 05:43

## 2019-12-09 RX ADMIN — ENOXAPARIN SODIUM 40 MILLIGRAM(S): 100 INJECTION SUBCUTANEOUS at 12:28

## 2019-12-09 NOTE — PROGRESS NOTE ADULT - SUBJECTIVE AND OBJECTIVE BOX
[facility]    V 3WES 355 W1    Patient is a 59y old  Female who presents with a chief complaint of wheezing  coughing (09 Dec 2019 08:13)       Allergies    Colace (Unknown)  phenobarbital (Unknown)    Intolerances        HPI:  wheezing  coughing  leaves in group home (02 Dec 2019 14:41)      PAST MEDICAL & SURGICAL HISTORY:  Osteoporosis  CP (cerebral palsy)  Scoliosis  Mentally challenged  Epilepsy      FAMILY HISTORY:        MEDICATIONS   albuterol/ipratropium for Nebulization 3 milliLiter(s) Nebulizer every 6 hours  buDESOnide    Inhalation Suspension 0.5 milliGRAM(s) Inhalation two times a day  dextrose 40% Gel 15 Gram(s) Oral once PRN  dextrose 5%. 1000 milliLiter(s) IV Continuous <Continuous>  dextrose 50% Injectable 12.5 Gram(s) IV Push once  dextrose 50% Injectable 25 Gram(s) IV Push once  dextrose 50% Injectable 25 Gram(s) IV Push once  diltiazem    milliGRAM(s) Oral daily  diVALproex Sprinkle 375 milliGRAM(s) Oral daily  diVALproex Sprinkle 125 milliGRAM(s) Oral daily  enoxaparin Injectable 40 milliGRAM(s) SubCutaneous daily  furosemide    Tablet 20 milliGRAM(s) Oral daily  lactulose Syrup 10 Gram(s) Oral three times a day  latanoprost 0.005% Ophthalmic Solution 1 Drop(s) Both EYES at bedtime  LORazepam   Injectable 0.5 milliGRAM(s) IV Push every 4 hours PRN  montelukast 10 milliGRAM(s) Oral at bedtime  potassium chloride    Tablet ER 10 milliEquivalent(s) Oral daily  predniSONE   Tablet 10 milliGRAM(s) Oral daily  senna 2 Tablet(s) Oral at bedtime      Vital Signs Last 24 Hrs  T(C): 37.6 (09 Dec 2019 08:00), Max: 37.7 (09 Dec 2019 05:05)  T(F): 99.6 (09 Dec 2019 08:00), Max: 99.9 (09 Dec 2019 05:05)  HR: 82 (09 Dec 2019 08:09) (76 - 98)  BP: 97/67 (09 Dec 2019 08:00) (94/61 - 128/56)  BP(mean): --  RR: 18 (09 Dec 2019 08:00) (18 - 18)  SpO2: 92% (09 Dec 2019 08:51) (90% - 99%)      12-08-19 @ 07:01  -  12-09-19 @ 07:00  --------------------------------------------------------  IN: 425 mL / OUT: 120 mL / NET: 305 mL            LABS:                        13.1   13.21 )-----------( 392      ( 08 Dec 2019 07:31 )             40.2     12-08    144  |  108  |  8   ----------------------------<  85  4.4   |  31  |  0.42<L>    Ca    9.0      08 Dec 2019 07:31    TPro  6.1  /  Alb  2.6<L>  /  TBili  0.4  /  DBili  x   /  AST  17  /  ALT  22  /  AlkPhos  52  12-08              WBC:  WBC Count: 13.21 K/uL (12-08 @ 07:31)  WBC Count: 10.36 K/uL (12-06 @ 08:20)      MICROBIOLOGY:  RECENT CULTURES:  12-03 .Blood Blood XXXX XXXX   No growth at 5 days.    12-02 .Urine Catheterized XXXX XXXX   No growth    12-02 .Blood Blood-Peripheral XXXX XXXX   No growth at 5 days.                    Sodium:  Sodium, Serum: 144 mmol/L (12-08 @ 07:31)  Sodium, Serum: 148 mmol/L (12-06 @ 08:20)      0.42 mg/dL 12-08 @ 07:31  0.42 mg/dL 12-06 @ 08:20      Hemoglobin:  Hemoglobin: 13.1 g/dL (12-08 @ 07:31)  Hemoglobin: 12.8 g/dL (12-06 @ 08:20)      Platelets: Platelet Count - Automated: 392 K/uL (12-08 @ 07:31)  Platelet Count - Automated: 249 K/uL (12-06 @ 08:20)      LIVER FUNCTIONS - ( 08 Dec 2019 07:31 )  Alb: 2.6 g/dL / Pro: 6.1 g/dL / ALK PHOS: 52 U/L / ALT: 22 U/L / AST: 17 U/L / GGT: x                 RADIOLOGY & ADDITIONAL STUDIES:

## 2019-12-09 NOTE — DISCHARGE NOTE PROVIDER - NSDCCPCAREPLAN_GEN_ALL_CORE_FT
PRINCIPAL DISCHARGE DIAGNOSIS  Diagnosis: Parainfluenza  Assessment and Plan of Treatment: Parainfluenza  with viral pn  not bacterial  seen by pulmonary and id  complete 5 days prednisone  keep observing off antabiotics  all home medications diet activity the same

## 2019-12-09 NOTE — DISCHARGE NOTE PROVIDER - NSDCMRMEDTOKEN_GEN_ALL_CORE_FT
ascorbic acid 500 mg oral tablet: 1 tab(s) orally 2 times a day  budesonide 0.5 mg/2 mL inhalation suspension: 2 milliliter(s) inhaled 2 times a day  dilTIAZem 300 mg/24 hours oral capsule, extended release: 1 cap(s) orally once a day  divalproex sodium 125 mg oral delayed release capsule: 2 cap(s) orally once a day (in the morning)  divalproex sodium 125 mg oral delayed release capsule: 3 cap(s) orally once a day (in the evening)  furosemide 20 mg oral tablet: 1 tab(s) orally once a day  ipratropium-albuterol 0.5 mg-2.5 mg/3 mLinhalation solution: 3 milliliter(s) inhaled every 6 hours  lactulose 10 g/15 mL oral syrup: 15 milliliter(s) orally 3 times a day  montelukast 10 mg oral tablet: 1 tab(s) orally once a day (at bedtime)  Multiple Vitamins with Minerals oral tablet: 1 tab(s) orally once a day  potassium chloride 8 mEq (600 mg) oral capsule, extended release: 1 cap(s) orally once a day with lasix   predniSONE 10 mg oral tablet: 1 tab(s) orally once a day 5 days  senna oral tablet: 2 tab(s) orally once a day (at bedtime)  Travatan Z 0.004% ophthalmic solution: 1 drop(s) to each affected eye once a day (in the evening)

## 2019-12-09 NOTE — PROGRESS NOTE ADULT - REASON FOR ADMISSION
wheezing  coughing

## 2019-12-09 NOTE — DISCHARGE NOTE NURSING/CASE MANAGEMENT/SOCIAL WORK - PATIENT PORTAL LINK FT
You can access the FollowMyHealth Patient Portal offered by [facility] by registering at the following website: [email or web address]. By joining [email or web address] FollowMyHealth portal, you will also be able to view your health information using other applications (apps) compatible with our system.

## 2019-12-09 NOTE — DISCHARGE NOTE PROVIDER - HOSPITAL COURSE
patient came in fevers cough    + parainfluenza    seen by pulmonary and id    cxr/ct infiltrates    was taken off antabiotics    given fluids iv solumedrol    eating well    alert    interactive    cxr labs ordered this morning    continue prednisone 10mg qd

## 2019-12-13 RX ORDER — IPRATROPIUM/ALBUTEROL SULFATE 18-103MCG
3 AEROSOL WITH ADAPTER (GRAM) INHALATION
Qty: 0 | Refills: 0 | DISCHARGE

## 2019-12-13 RX ORDER — MAGNESIUM HYDROXIDE 400 MG/1
30 TABLET, CHEWABLE ORAL
Qty: 0 | Refills: 0 | DISCHARGE

## 2019-12-13 RX ORDER — ALBUTEROL 90 UG/1
2 AEROSOL, METERED ORAL
Qty: 0 | Refills: 0 | DISCHARGE

## 2019-12-13 RX ORDER — ASPIRIN/CALCIUM CARB/MAGNESIUM 324 MG
1 TABLET ORAL
Qty: 0 | Refills: 0 | DISCHARGE

## 2019-12-13 RX ORDER — DIVALPROEX SODIUM 500 MG/1
1 TABLET, DELAYED RELEASE ORAL
Qty: 0 | Refills: 0 | DISCHARGE

## 2019-12-13 RX ORDER — LANOLIN/MINERAL OIL
1 LOTION (ML) TOPICAL
Qty: 0 | Refills: 0 | DISCHARGE

## 2019-12-13 RX ORDER — ALBUTEROL 90 UG/1
0 AEROSOL, METERED ORAL
Qty: 0 | Refills: 0 | DISCHARGE

## 2019-12-13 RX ORDER — HYDROXYZINE HCL 10 MG
1 TABLET ORAL
Qty: 0 | Refills: 0 | DISCHARGE

## 2019-12-13 RX ORDER — DENOSUMAB 60 MG/ML
0 INJECTION SUBCUTANEOUS
Qty: 0 | Refills: 0 | DISCHARGE

## 2019-12-13 RX ORDER — ASCORBIC ACID 60 MG
1 TABLET,CHEWABLE ORAL
Qty: 0 | Refills: 0 | DISCHARGE

## 2019-12-13 RX ORDER — LACTULOSE 10 G/15ML
0 SOLUTION ORAL
Qty: 0 | Refills: 0 | DISCHARGE

## 2019-12-13 RX ORDER — FERROUS SULFATE 325(65) MG
1 TABLET ORAL
Qty: 0 | Refills: 0 | DISCHARGE

## 2019-12-13 RX ORDER — NYSTATIN CREAM 100000 [USP'U]/G
1 CREAM TOPICAL
Qty: 0 | Refills: 0 | DISCHARGE

## 2019-12-13 RX ORDER — DIPHENHYDRAMINE HCL 50 MG
1 CAPSULE ORAL
Qty: 0 | Refills: 0 | DISCHARGE

## 2019-12-13 RX ORDER — HYDROCORTISONE 1 %
1 OINTMENT (GRAM) TOPICAL
Qty: 0 | Refills: 0 | DISCHARGE

## 2019-12-13 RX ORDER — ZINC OXIDE 200 MG/G
0 OINTMENT TOPICAL
Qty: 0 | Refills: 0 | DISCHARGE

## 2019-12-13 RX ORDER — WHEAT DEXTRIN 3 G/4 G
0 POWDER IN PACKET (EA) ORAL
Qty: 0 | Refills: 0 | DISCHARGE

## 2019-12-13 RX ORDER — LORATADINE 10 MG/1
1 TABLET ORAL
Qty: 0 | Refills: 0 | DISCHARGE

## 2019-12-13 RX ORDER — POTASSIUM CHLORIDE 20 MEQ
1 PACKET (EA) ORAL
Qty: 0 | Refills: 0 | DISCHARGE

## 2019-12-13 RX ORDER — LACTULOSE 10 G/15ML
30 SOLUTION ORAL
Qty: 0 | Refills: 0 | DISCHARGE

## 2019-12-13 RX ORDER — LEVOTHYROXINE SODIUM 125 MCG
1 TABLET ORAL
Qty: 0 | Refills: 0 | DISCHARGE

## 2019-12-13 RX ORDER — DENOSUMAB 60 MG/ML
1 INJECTION SUBCUTANEOUS
Qty: 0 | Refills: 0 | DISCHARGE

## 2019-12-13 RX ORDER — WHEAT DEXTRIN 3 G/4 G
15 POWDER IN PACKET (EA) ORAL
Qty: 0 | Refills: 0 | DISCHARGE

## 2019-12-13 RX ORDER — CHLORHEXIDINE GLUCONATE 213 G/1000ML
15 SOLUTION TOPICAL
Qty: 0 | Refills: 0 | DISCHARGE

## 2019-12-13 RX ORDER — CHOLECALCIFEROL (VITAMIN D3) 125 MCG
1 CAPSULE ORAL
Qty: 0 | Refills: 0 | DISCHARGE

## 2019-12-13 RX ORDER — FOLIC ACID 0.8 MG
1 TABLET ORAL
Qty: 0 | Refills: 0 | DISCHARGE

## 2019-12-13 RX ORDER — MONTELUKAST 4 MG/1
1 TABLET, CHEWABLE ORAL
Qty: 0 | Refills: 0 | DISCHARGE

## 2019-12-13 RX ORDER — BUDESONIDE AND FORMOTEROL FUMARATE DIHYDRATE 160; 4.5 UG/1; UG/1
2 AEROSOL RESPIRATORY (INHALATION)
Qty: 0 | Refills: 0 | DISCHARGE

## 2019-12-13 RX ORDER — ALBUTEROL 90 UG/1
3 AEROSOL, METERED ORAL
Qty: 0 | Refills: 0 | DISCHARGE

## 2020-01-22 NOTE — REASON FOR VISIT
[Follow-Up - Clinic] : a clinic follow-up of [Atrial Fibrillation] : atrial fibrillation [Hypertension] : hypertension [Medication Management] : Medication management [FreeTextEntry1] : f/u  Initial (On Arrival)

## 2020-03-28 ENCOUNTER — INPATIENT (INPATIENT)
Facility: HOSPITAL | Age: 60
LOS: 11 days | DRG: 177 | End: 2020-04-09
Attending: FAMILY MEDICINE | Admitting: FAMILY MEDICINE
Payer: MEDICARE

## 2020-03-28 VITALS
HEART RATE: 93 BPM | WEIGHT: 130.07 LBS | DIASTOLIC BLOOD PRESSURE: 63 MMHG | HEIGHT: 63 IN | SYSTOLIC BLOOD PRESSURE: 94 MMHG | RESPIRATION RATE: 22 BRPM | OXYGEN SATURATION: 98 %

## 2020-03-28 DIAGNOSIS — I11.0 HYPERTENSIVE HEART DISEASE WITH HEART FAILURE: ICD-10-CM

## 2020-03-28 DIAGNOSIS — M41.9 SCOLIOSIS, UNSPECIFIED: ICD-10-CM

## 2020-03-28 DIAGNOSIS — E87.2 ACIDOSIS: ICD-10-CM

## 2020-03-28 DIAGNOSIS — Z51.5 ENCOUNTER FOR PALLIATIVE CARE: ICD-10-CM

## 2020-03-28 DIAGNOSIS — Z79.52 LONG TERM (CURRENT) USE OF SYSTEMIC STEROIDS: ICD-10-CM

## 2020-03-28 DIAGNOSIS — Z79.82 LONG TERM (CURRENT) USE OF ASPIRIN: ICD-10-CM

## 2020-03-28 DIAGNOSIS — F73 PROFOUND INTELLECTUAL DISABILITIES: ICD-10-CM

## 2020-03-28 DIAGNOSIS — J80 ACUTE RESPIRATORY DISTRESS SYNDROME: ICD-10-CM

## 2020-03-28 DIAGNOSIS — J44.0 CHRONIC OBSTRUCTIVE PULMONARY DISEASE WITH (ACUTE) LOWER RESPIRATORY INFECTION: ICD-10-CM

## 2020-03-28 DIAGNOSIS — N17.9 ACUTE KIDNEY FAILURE, UNSPECIFIED: ICD-10-CM

## 2020-03-28 DIAGNOSIS — Z78.1 PHYSICAL RESTRAINT STATUS: ICD-10-CM

## 2020-03-28 DIAGNOSIS — R68.0 HYPOTHERMIA, NOT ASSOCIATED WITH LOW ENVIRONMENTAL TEMPERATURE: ICD-10-CM

## 2020-03-28 DIAGNOSIS — J12.89 OTHER VIRAL PNEUMONIA: ICD-10-CM

## 2020-03-28 DIAGNOSIS — U07.1 COVID-19: ICD-10-CM

## 2020-03-28 DIAGNOSIS — I48.0 PAROXYSMAL ATRIAL FIBRILLATION: ICD-10-CM

## 2020-03-28 DIAGNOSIS — I95.9 HYPOTENSION, UNSPECIFIED: ICD-10-CM

## 2020-03-28 DIAGNOSIS — G40.909 EPILEPSY, UNSPECIFIED, NOT INTRACTABLE, WITHOUT STATUS EPILEPTICUS: ICD-10-CM

## 2020-03-28 DIAGNOSIS — E87.0 HYPEROSMOLALITY AND HYPERNATREMIA: ICD-10-CM

## 2020-03-28 DIAGNOSIS — Z66 DO NOT RESUSCITATE: ICD-10-CM

## 2020-03-28 DIAGNOSIS — I50.32 CHRONIC DIASTOLIC (CONGESTIVE) HEART FAILURE: ICD-10-CM

## 2020-03-28 DIAGNOSIS — G80.9 CEREBRAL PALSY, UNSPECIFIED: ICD-10-CM

## 2020-03-28 DIAGNOSIS — Z88.8 ALLERGY STATUS TO OTHER DRUGS, MEDICAMENTS AND BIOLOGICAL SUBSTANCES STATUS: ICD-10-CM

## 2020-03-28 RX ORDER — SODIUM CHLORIDE 9 MG/ML
1000 INJECTION INTRAMUSCULAR; INTRAVENOUS; SUBCUTANEOUS
Refills: 0 | Status: COMPLETED | OUTPATIENT
Start: 2020-03-28 | End: 2020-03-29

## 2020-03-28 NOTE — ED PROVIDER NOTE - CARE PLAN
Principal Discharge DX:	Coronavirus infection  Secondary Diagnosis:	Hypoxia  Secondary Diagnosis:	Shortness of breath

## 2020-03-28 NOTE — ED PROVIDER NOTE - OBJECTIVE STATEMENT
58 yo F p/w fever , some dyspnea noted today. Pt was diag with COVID by swab. NO change in activity or behavior. No vomiting, no known diarrhea. no other co. EMS found pt with O2 sat ~ 80 on RA, improved with NC.

## 2020-03-28 NOTE — ED PROVIDER NOTE - PMH
Chest pain    COPD (chronic obstructive pulmonary disease)    CP (cerebral palsy)    Epilepsy    Mentally challenged    MR (mental retardation), severe  immobilty sec to severe dissability  Osteoporosis    Osteoporosis    PVD (peripheral vascular disease)    Scoliosis    Scoliosis    Seizure

## 2020-03-28 NOTE — ED PROVIDER NOTE - ENMT, MLM
Airway patent, Nasal mucosa clear. Mouth with normal mucosa. Throat has no vesicles, no oropharyngeal exudates and uvula is midline. MM Moist. neck supple

## 2020-03-29 DIAGNOSIS — B34.2 CORONAVIRUS INFECTION, UNSPECIFIED: ICD-10-CM

## 2020-03-29 DIAGNOSIS — I48.0 PAROXYSMAL ATRIAL FIBRILLATION: ICD-10-CM

## 2020-03-29 DIAGNOSIS — R09.02 HYPOXEMIA: ICD-10-CM

## 2020-03-29 PROBLEM — F79 UNSPECIFIED INTELLECTUAL DISABILITIES: Chronic | Status: ACTIVE | Noted: 2019-12-02

## 2020-03-29 PROBLEM — G40.909 EPILEPSY, UNSPECIFIED, NOT INTRACTABLE, WITHOUT STATUS EPILEPTICUS: Chronic | Status: ACTIVE | Noted: 2019-12-02

## 2020-03-29 PROBLEM — M41.9 SCOLIOSIS, UNSPECIFIED: Chronic | Status: ACTIVE | Noted: 2019-12-02

## 2020-03-29 PROBLEM — G80.9 CEREBRAL PALSY, UNSPECIFIED: Chronic | Status: ACTIVE | Noted: 2019-12-02

## 2020-03-29 PROBLEM — M81.0 AGE-RELATED OSTEOPOROSIS WITHOUT CURRENT PATHOLOGICAL FRACTURE: Chronic | Status: ACTIVE | Noted: 2019-12-02

## 2020-03-29 LAB
ALBUMIN SERPL ELPH-MCNC: 2.5 G/DL — LOW (ref 3.3–5)
ALBUMIN SERPL ELPH-MCNC: 3 G/DL — LOW (ref 3.3–5)
ALP SERPL-CCNC: 43 U/L — SIGNIFICANT CHANGE UP (ref 40–120)
ALP SERPL-CCNC: 47 U/L — SIGNIFICANT CHANGE UP (ref 40–120)
ALT FLD-CCNC: 25 U/L — SIGNIFICANT CHANGE UP (ref 12–78)
ALT FLD-CCNC: 28 U/L — SIGNIFICANT CHANGE UP (ref 12–78)
ANION GAP SERPL CALC-SCNC: 4 MMOL/L — LOW (ref 5–17)
ANION GAP SERPL CALC-SCNC: 5 MMOL/L — SIGNIFICANT CHANGE UP (ref 5–17)
APTT BLD: 25.1 SEC — LOW (ref 28.5–37)
AST SERPL-CCNC: 32 U/L — SIGNIFICANT CHANGE UP (ref 15–37)
AST SERPL-CCNC: 33 U/L — SIGNIFICANT CHANGE UP (ref 15–37)
BASOPHILS # BLD AUTO: 0 K/UL — SIGNIFICANT CHANGE UP (ref 0–0.2)
BASOPHILS # BLD AUTO: 0.02 K/UL — SIGNIFICANT CHANGE UP (ref 0–0.2)
BASOPHILS NFR BLD AUTO: 0 % — SIGNIFICANT CHANGE UP (ref 0–2)
BASOPHILS NFR BLD AUTO: 0.3 % — SIGNIFICANT CHANGE UP (ref 0–2)
BILIRUB SERPL-MCNC: 0.2 MG/DL — SIGNIFICANT CHANGE UP (ref 0.2–1.2)
BILIRUB SERPL-MCNC: 0.2 MG/DL — SIGNIFICANT CHANGE UP (ref 0.2–1.2)
BUN SERPL-MCNC: 14 MG/DL — SIGNIFICANT CHANGE UP (ref 7–23)
BUN SERPL-MCNC: 14 MG/DL — SIGNIFICANT CHANGE UP (ref 7–23)
CALCIUM SERPL-MCNC: 7.9 MG/DL — LOW (ref 8.5–10.1)
CALCIUM SERPL-MCNC: 9.2 MG/DL — SIGNIFICANT CHANGE UP (ref 8.5–10.1)
CHLORIDE SERPL-SCNC: 105 MMOL/L — SIGNIFICANT CHANGE UP (ref 96–108)
CHLORIDE SERPL-SCNC: 116 MMOL/L — HIGH (ref 96–108)
CO2 SERPL-SCNC: 26 MMOL/L — SIGNIFICANT CHANGE UP (ref 22–31)
CO2 SERPL-SCNC: 33 MMOL/L — HIGH (ref 22–31)
CREAT SERPL-MCNC: 0.42 MG/DL — LOW (ref 0.5–1.3)
CREAT SERPL-MCNC: 0.65 MG/DL — SIGNIFICANT CHANGE UP (ref 0.5–1.3)
EOSINOPHIL # BLD AUTO: 0 K/UL — SIGNIFICANT CHANGE UP (ref 0–0.5)
EOSINOPHIL # BLD AUTO: 0.01 K/UL — SIGNIFICANT CHANGE UP (ref 0–0.5)
EOSINOPHIL NFR BLD AUTO: 0 % — SIGNIFICANT CHANGE UP (ref 0–6)
EOSINOPHIL NFR BLD AUTO: 0.2 % — SIGNIFICANT CHANGE UP (ref 0–6)
GLUCOSE SERPL-MCNC: 90 MG/DL — SIGNIFICANT CHANGE UP (ref 70–99)
GLUCOSE SERPL-MCNC: 90 MG/DL — SIGNIFICANT CHANGE UP (ref 70–99)
HCT VFR BLD CALC: 38 % — SIGNIFICANT CHANGE UP (ref 34.5–45)
HCT VFR BLD CALC: 40.9 % — SIGNIFICANT CHANGE UP (ref 34.5–45)
HGB BLD-MCNC: 12.2 G/DL — SIGNIFICANT CHANGE UP (ref 11.5–15.5)
HGB BLD-MCNC: 13.6 G/DL — SIGNIFICANT CHANGE UP (ref 11.5–15.5)
IMM GRANULOCYTES NFR BLD AUTO: 1.2 % — SIGNIFICANT CHANGE UP (ref 0–1.5)
INR BLD: 1.11 RATIO — SIGNIFICANT CHANGE UP (ref 0.88–1.16)
LACTATE SERPL-SCNC: 1.3 MMOL/L — SIGNIFICANT CHANGE UP (ref 0.7–2)
LYMPHOCYTES # BLD AUTO: 0.61 K/UL — LOW (ref 1–3.3)
LYMPHOCYTES # BLD AUTO: 1.3 K/UL — SIGNIFICANT CHANGE UP (ref 1–3.3)
LYMPHOCYTES # BLD AUTO: 13 % — SIGNIFICANT CHANGE UP (ref 13–44)
LYMPHOCYTES # BLD AUTO: 21.5 % — SIGNIFICANT CHANGE UP (ref 13–44)
MANUAL SMEAR VERIFICATION: YES — SIGNIFICANT CHANGE UP
MCHC RBC-ENTMCNC: 32.1 GM/DL — SIGNIFICANT CHANGE UP (ref 32–36)
MCHC RBC-ENTMCNC: 32.6 PG — SIGNIFICANT CHANGE UP (ref 27–34)
MCHC RBC-ENTMCNC: 32.7 PG — SIGNIFICANT CHANGE UP (ref 27–34)
MCHC RBC-ENTMCNC: 33.3 GM/DL — SIGNIFICANT CHANGE UP (ref 32–36)
MCV RBC AUTO: 101.6 FL — HIGH (ref 80–100)
MCV RBC AUTO: 98.3 FL — SIGNIFICANT CHANGE UP (ref 80–100)
MONOCYTES # BLD AUTO: 0.52 K/UL — SIGNIFICANT CHANGE UP (ref 0–0.9)
MONOCYTES # BLD AUTO: 0.93 K/UL — HIGH (ref 0–0.9)
MONOCYTES NFR BLD AUTO: 11 % — SIGNIFICANT CHANGE UP (ref 2–14)
MONOCYTES NFR BLD AUTO: 15.4 % — HIGH (ref 2–14)
NEUTROPHILS # BLD AUTO: 3.57 K/UL — SIGNIFICANT CHANGE UP (ref 1.8–7.4)
NEUTROPHILS # BLD AUTO: 3.72 K/UL — SIGNIFICANT CHANGE UP (ref 1.8–7.4)
NEUTROPHILS NFR BLD AUTO: 61.4 % — SIGNIFICANT CHANGE UP (ref 43–77)
NEUTROPHILS NFR BLD AUTO: 69 % — SIGNIFICANT CHANGE UP (ref 43–77)
NEUTS BAND # BLD: 7 % — SIGNIFICANT CHANGE UP (ref 0–8)
NRBC # BLD: 0 /100 WBCS — SIGNIFICANT CHANGE UP (ref 0–0)
NRBC # BLD: 0 — SIGNIFICANT CHANGE UP
NRBC # BLD: SIGNIFICANT CHANGE UP /100 WBCS (ref 0–0)
PLAT MORPH BLD: NORMAL — SIGNIFICANT CHANGE UP
PLATELET # BLD AUTO: 158 K/UL — SIGNIFICANT CHANGE UP (ref 150–400)
PLATELET # BLD AUTO: 159 K/UL — SIGNIFICANT CHANGE UP (ref 150–400)
POTASSIUM SERPL-MCNC: 3.2 MMOL/L — LOW (ref 3.5–5.3)
POTASSIUM SERPL-MCNC: 3.8 MMOL/L — SIGNIFICANT CHANGE UP (ref 3.5–5.3)
POTASSIUM SERPL-SCNC: 3.2 MMOL/L — LOW (ref 3.5–5.3)
POTASSIUM SERPL-SCNC: 3.8 MMOL/L — SIGNIFICANT CHANGE UP (ref 3.5–5.3)
PROCALCITONIN SERPL-MCNC: 0.11 NG/ML — HIGH (ref 0–0.04)
PROT SERPL-MCNC: 5.9 G/DL — LOW (ref 6–8.3)
PROT SERPL-MCNC: 6.7 G/DL — SIGNIFICANT CHANGE UP (ref 6–8.3)
PROTHROM AB SERPL-ACNC: 12.5 SEC — SIGNIFICANT CHANGE UP (ref 10–12.9)
RBC # BLD: 3.74 M/UL — LOW (ref 3.8–5.2)
RBC # BLD: 4.16 M/UL — SIGNIFICANT CHANGE UP (ref 3.8–5.2)
RBC # FLD: 14.6 % — HIGH (ref 10.3–14.5)
RBC # FLD: 14.6 % — HIGH (ref 10.3–14.5)
RBC BLD AUTO: SIGNIFICANT CHANGE UP
SODIUM SERPL-SCNC: 143 MMOL/L — SIGNIFICANT CHANGE UP (ref 135–145)
SODIUM SERPL-SCNC: 146 MMOL/L — HIGH (ref 135–145)
WBC # BLD: 4.7 K/UL — SIGNIFICANT CHANGE UP (ref 3.8–10.5)
WBC # BLD: 6.05 K/UL — SIGNIFICANT CHANGE UP (ref 3.8–10.5)
WBC # FLD AUTO: 4.7 K/UL — SIGNIFICANT CHANGE UP (ref 3.8–10.5)
WBC # FLD AUTO: 6.05 K/UL — SIGNIFICANT CHANGE UP (ref 3.8–10.5)

## 2020-03-29 PROCEDURE — 93010 ELECTROCARDIOGRAM REPORT: CPT

## 2020-03-29 PROCEDURE — 71045 X-RAY EXAM CHEST 1 VIEW: CPT | Mod: 26

## 2020-03-29 PROCEDURE — 99285 EMERGENCY DEPT VISIT HI MDM: CPT | Mod: CS

## 2020-03-29 RX ORDER — BUDESONIDE, MICRONIZED 100 %
2 POWDER (GRAM) MISCELLANEOUS
Qty: 0 | Refills: 0 | DISCHARGE

## 2020-03-29 RX ORDER — HEPARIN SODIUM 5000 [USP'U]/ML
5000 INJECTION INTRAVENOUS; SUBCUTANEOUS EVERY 12 HOURS
Refills: 0 | Status: DISCONTINUED | OUTPATIENT
Start: 2020-03-29 | End: 2020-03-30

## 2020-03-29 RX ORDER — POTASSIUM CHLORIDE 20 MEQ
1 PACKET (EA) ORAL
Qty: 0 | Refills: 0 | DISCHARGE

## 2020-03-29 RX ORDER — LACTULOSE 10 G/15ML
10 SOLUTION ORAL THREE TIMES A DAY
Refills: 0 | Status: DISCONTINUED | OUTPATIENT
Start: 2020-03-29 | End: 2020-04-09

## 2020-03-29 RX ORDER — CALCIUM CARBONATE 500(1250)
1 TABLET ORAL
Qty: 0 | Refills: 0 | DISCHARGE

## 2020-03-29 RX ORDER — TIOTROPIUM BROMIDE 18 UG/1
1 CAPSULE ORAL; RESPIRATORY (INHALATION) DAILY
Refills: 0 | Status: DISCONTINUED | OUTPATIENT
Start: 2020-03-29 | End: 2020-04-09

## 2020-03-29 RX ORDER — LATANOPROST 0.05 MG/ML
1 SOLUTION/ DROPS OPHTHALMIC; TOPICAL AT BEDTIME
Refills: 0 | Status: DISCONTINUED | OUTPATIENT
Start: 2020-03-29 | End: 2020-04-09

## 2020-03-29 RX ORDER — DIVALPROEX SODIUM 500 MG/1
3 TABLET, DELAYED RELEASE ORAL
Qty: 0 | Refills: 0 | DISCHARGE

## 2020-03-29 RX ORDER — POTASSIUM CHLORIDE 20 MEQ
40 PACKET (EA) ORAL ONCE
Refills: 0 | Status: DISCONTINUED | OUTPATIENT
Start: 2020-03-29 | End: 2020-03-31

## 2020-03-29 RX ORDER — SODIUM CHLORIDE 9 MG/ML
1000 INJECTION INTRAMUSCULAR; INTRAVENOUS; SUBCUTANEOUS
Refills: 0 | Status: DISCONTINUED | OUTPATIENT
Start: 2020-03-29 | End: 2020-04-01

## 2020-03-29 RX ORDER — MONTELUKAST 4 MG/1
0 TABLET, CHEWABLE ORAL
Qty: 0 | Refills: 0 | DISCHARGE

## 2020-03-29 RX ORDER — DIVALPROEX SODIUM 500 MG/1
250 TABLET, DELAYED RELEASE ORAL DAILY
Refills: 0 | Status: DISCONTINUED | OUTPATIENT
Start: 2020-03-29 | End: 2020-03-31

## 2020-03-29 RX ORDER — CHOLECALCIFEROL (VITAMIN D3) 125 MCG
2000 CAPSULE ORAL
Refills: 0 | Status: DISCONTINUED | OUTPATIENT
Start: 2020-03-29 | End: 2020-04-09

## 2020-03-29 RX ORDER — MONTELUKAST 4 MG/1
1 TABLET, CHEWABLE ORAL
Qty: 0 | Refills: 0 | DISCHARGE

## 2020-03-29 RX ORDER — BUDESONIDE, MICRONIZED 100 %
0 POWDER (GRAM) MISCELLANEOUS
Qty: 0 | Refills: 0 | DISCHARGE

## 2020-03-29 RX ORDER — TRAVOPROST 0.04 MG/ML
1 SOLUTION/ DROPS OPHTHALMIC
Qty: 0 | Refills: 0 | DISCHARGE

## 2020-03-29 RX ORDER — DILTIAZEM HCL 120 MG
1 CAPSULE, EXT RELEASE 24 HR ORAL
Qty: 0 | Refills: 0 | DISCHARGE

## 2020-03-29 RX ORDER — DENOSUMAB 60 MG/ML
1 INJECTION SUBCUTANEOUS
Qty: 0 | Refills: 0 | DISCHARGE

## 2020-03-29 RX ORDER — FUROSEMIDE 40 MG
20 TABLET ORAL DAILY
Refills: 0 | Status: DISCONTINUED | OUTPATIENT
Start: 2020-03-29 | End: 2020-03-30

## 2020-03-29 RX ORDER — POTASSIUM CHLORIDE 20 MEQ
10 PACKET (EA) ORAL EVERY 4 HOURS
Refills: 0 | Status: COMPLETED | OUTPATIENT
Start: 2020-03-29 | End: 2020-03-29

## 2020-03-29 RX ORDER — ASCORBIC ACID 60 MG
500 TABLET,CHEWABLE ORAL
Refills: 0 | Status: DISCONTINUED | OUTPATIENT
Start: 2020-03-29 | End: 2020-04-09

## 2020-03-29 RX ORDER — SENNA PLUS 8.6 MG/1
2 TABLET ORAL AT BEDTIME
Refills: 0 | Status: DISCONTINUED | OUTPATIENT
Start: 2020-03-29 | End: 2020-04-09

## 2020-03-29 RX ORDER — WHEAT DEXTRIN 3 G/4 G
0 POWDER IN PACKET (EA) ORAL
Qty: 0 | Refills: 0 | DISCHARGE

## 2020-03-29 RX ORDER — FOLIC ACID 0.8 MG
1 TABLET ORAL
Qty: 0 | Refills: 0 | DISCHARGE

## 2020-03-29 RX ORDER — DILTIAZEM HCL 120 MG
240 CAPSULE, EXT RELEASE 24 HR ORAL DAILY
Refills: 0 | Status: DISCONTINUED | OUTPATIENT
Start: 2020-03-29 | End: 2020-03-29

## 2020-03-29 RX ORDER — ALBUTEROL 90 UG/1
3 AEROSOL, METERED ORAL
Qty: 0 | Refills: 0 | DISCHARGE

## 2020-03-29 RX ORDER — FUROSEMIDE 40 MG
1 TABLET ORAL
Qty: 0 | Refills: 0 | DISCHARGE

## 2020-03-29 RX ORDER — MONTELUKAST 4 MG/1
10 TABLET, CHEWABLE ORAL AT BEDTIME
Refills: 0 | Status: DISCONTINUED | OUTPATIENT
Start: 2020-03-29 | End: 2020-04-09

## 2020-03-29 RX ORDER — DIVALPROEX SODIUM 500 MG/1
2 TABLET, DELAYED RELEASE ORAL
Qty: 0 | Refills: 0 | DISCHARGE

## 2020-03-29 RX ORDER — MAGNESIUM HYDROXIDE 400 MG/1
0 TABLET, CHEWABLE ORAL
Qty: 0 | Refills: 0 | DISCHARGE

## 2020-03-29 RX ORDER — VERAPAMIL HCL 240 MG
40 CAPSULE, EXTENDED RELEASE PELLETS 24 HR ORAL
Refills: 0 | Status: DISCONTINUED | OUTPATIENT
Start: 2020-03-29 | End: 2020-03-31

## 2020-03-29 RX ORDER — CHOLECALCIFEROL (VITAMIN D3) 125 MCG
1 CAPSULE ORAL
Qty: 0 | Refills: 0 | DISCHARGE

## 2020-03-29 RX ORDER — WHEAT DEXTRIN 3 G/4 G
15 POWDER IN PACKET (EA) ORAL
Qty: 0 | Refills: 0 | DISCHARGE

## 2020-03-29 RX ORDER — MIDODRINE HYDROCHLORIDE 2.5 MG/1
2.5 TABLET ORAL THREE TIMES A DAY
Refills: 0 | Status: DISCONTINUED | OUTPATIENT
Start: 2020-03-29 | End: 2020-04-06

## 2020-03-29 RX ORDER — ACETAMINOPHEN 500 MG
650 TABLET ORAL ONCE
Refills: 0 | Status: COMPLETED | OUTPATIENT
Start: 2020-03-29 | End: 2020-03-30

## 2020-03-29 RX ORDER — ALBUTEROL 90 UG/1
0 AEROSOL, METERED ORAL
Qty: 0 | Refills: 0 | DISCHARGE

## 2020-03-29 RX ORDER — PANTOPRAZOLE SODIUM 20 MG/1
40 TABLET, DELAYED RELEASE ORAL DAILY
Refills: 0 | Status: DISCONTINUED | OUTPATIENT
Start: 2020-03-29 | End: 2020-04-09

## 2020-03-29 RX ORDER — ALBUTEROL 90 UG/1
2 AEROSOL, METERED ORAL EVERY 6 HOURS
Refills: 0 | Status: DISCONTINUED | OUTPATIENT
Start: 2020-03-29 | End: 2020-04-09

## 2020-03-29 RX ORDER — DIVALPROEX SODIUM 500 MG/1
375 TABLET, DELAYED RELEASE ORAL AT BEDTIME
Refills: 0 | Status: DISCONTINUED | OUTPATIENT
Start: 2020-03-29 | End: 2020-03-31

## 2020-03-29 RX ADMIN — LACTULOSE 10 GRAM(S): 10 SOLUTION ORAL at 17:23

## 2020-03-29 RX ADMIN — LATANOPROST 1 DROP(S): 0.05 SOLUTION/ DROPS OPHTHALMIC; TOPICAL at 21:56

## 2020-03-29 RX ADMIN — Medication 500 MILLIGRAM(S): at 17:25

## 2020-03-29 RX ADMIN — LACTULOSE 10 GRAM(S): 10 SOLUTION ORAL at 21:56

## 2020-03-29 RX ADMIN — SODIUM CHLORIDE 1000 MILLILITER(S): 9 INJECTION INTRAMUSCULAR; INTRAVENOUS; SUBCUTANEOUS at 01:16

## 2020-03-29 RX ADMIN — Medication 1 TABLET(S): at 12:21

## 2020-03-29 RX ADMIN — DIVALPROEX SODIUM 250 MILLIGRAM(S): 500 TABLET, DELAYED RELEASE ORAL at 12:21

## 2020-03-29 RX ADMIN — SODIUM CHLORIDE 1000 MILLILITER(S): 9 INJECTION INTRAMUSCULAR; INTRAVENOUS; SUBCUTANEOUS at 00:22

## 2020-03-29 RX ADMIN — HEPARIN SODIUM 5000 UNIT(S): 5000 INJECTION INTRAVENOUS; SUBCUTANEOUS at 07:04

## 2020-03-29 RX ADMIN — ALBUTEROL 2 PUFF(S): 90 AEROSOL, METERED ORAL at 21:47

## 2020-03-29 RX ADMIN — Medication 2000 UNIT(S): at 07:10

## 2020-03-29 RX ADMIN — Medication 100 MILLIEQUIVALENT(S): at 07:22

## 2020-03-29 RX ADMIN — PANTOPRAZOLE SODIUM 40 MILLIGRAM(S): 20 TABLET, DELAYED RELEASE ORAL at 12:21

## 2020-03-29 RX ADMIN — LACTULOSE 10 GRAM(S): 10 SOLUTION ORAL at 07:05

## 2020-03-29 RX ADMIN — ALBUTEROL 2 PUFF(S): 90 AEROSOL, METERED ORAL at 07:12

## 2020-03-29 RX ADMIN — SODIUM CHLORIDE 100 MILLILITER(S): 9 INJECTION INTRAMUSCULAR; INTRAVENOUS; SUBCUTANEOUS at 02:16

## 2020-03-29 RX ADMIN — HEPARIN SODIUM 5000 UNIT(S): 5000 INJECTION INTRAVENOUS; SUBCUTANEOUS at 17:23

## 2020-03-29 RX ADMIN — TIOTROPIUM BROMIDE 1 CAPSULE(S): 18 CAPSULE ORAL; RESPIRATORY (INHALATION) at 07:12

## 2020-03-29 RX ADMIN — Medication 2000 UNIT(S): at 17:22

## 2020-03-29 RX ADMIN — SODIUM CHLORIDE 100 MILLILITER(S): 9 INJECTION INTRAMUSCULAR; INTRAVENOUS; SUBCUTANEOUS at 07:22

## 2020-03-29 RX ADMIN — Medication 500 MILLIGRAM(S): at 07:05

## 2020-03-29 RX ADMIN — Medication 100 MILLIEQUIVALENT(S): at 02:16

## 2020-03-29 NOTE — PROGRESS NOTE ADULT - PROBLEM SELECTOR PLAN 1
long hx of aspiration pn/asthma  pending covid19 cx  until then continue iv anatbiotics  with oxygen support

## 2020-03-29 NOTE — H&P ADULT - NSICDXPASTMEDICALHX_GEN_ALL_CORE_FT
PAST MEDICAL HISTORY:  Chest pain     COPD (chronic obstructive pulmonary disease)     CP (cerebral palsy)     Epilepsy     Mentally challenged     MR (mental retardation), severe immobilty sec to severe dissability    Osteoporosis     Osteoporosis     PVD (peripheral vascular disease)     Scoliosis     Scoliosis     Seizure

## 2020-03-29 NOTE — H&P ADULT - NSHPREVIEWOFSYSTEMS_GEN_ALL_CORE
profound mental retardation  cp  asthma  peripheral edema  chronic seizures  scoliosis  osteoporosis

## 2020-03-29 NOTE — ED ADULT NURSE NOTE - OBJECTIVE STATEMENT
Pt received laying on stretcher in NAD. Pt Awake and alert nonverbal was sent from group home for fever and SOB. pt nonverbal yelling incoherent language. PERRLA. Lungs BL rales. RR even unlabored. Ab soft non tender, + bowel sounds x 4quads. Denies Nausea, Vomiting, Diarrhea. Skin warm, dry, color appropriate for age and race. no skin break down noted

## 2020-03-29 NOTE — ED ADULT NURSE REASSESSMENT NOTE - NS ED NURSE REASSESS COMMENT FT1
Patient given and assisted with breakfast tray. Patient positioned for comfort. Plan of care discussed with patient. Comfort and safety maintained. Will continue to monitor.

## 2020-03-29 NOTE — ED ADULT NURSE REASSESSMENT NOTE - NS ED NURSE REASSESS COMMENT FT1
Report received from [place] at 0700, care assumed. Patient awake and alert. patient has intellectual disabilities. No signs of pain or discomfort. oxygen saturation in place. Nasal cannula remains in place. PIV intact. Plan of care discussed with patient. Comfort and safety maintained. Will continue to monitor.

## 2020-03-30 DIAGNOSIS — J12.81 PNEUMONIA DUE TO SARS-ASSOCIATED CORONAVIRUS: ICD-10-CM

## 2020-03-30 LAB
ALBUMIN SERPL ELPH-MCNC: 2.7 G/DL — LOW (ref 3.3–5)
ALP SERPL-CCNC: 49 U/L — SIGNIFICANT CHANGE UP (ref 40–120)
ALT FLD-CCNC: 24 U/L — SIGNIFICANT CHANGE UP (ref 12–78)
ANION GAP SERPL CALC-SCNC: 6 MMOL/L — SIGNIFICANT CHANGE UP (ref 5–17)
AST SERPL-CCNC: 35 U/L — SIGNIFICANT CHANGE UP (ref 15–37)
BASE EXCESS BLDA CALC-SCNC: 0.2 MMOL/L — SIGNIFICANT CHANGE UP (ref -2–2)
BASOPHILS # BLD AUTO: 0.03 K/UL — SIGNIFICANT CHANGE UP (ref 0–0.2)
BASOPHILS NFR BLD AUTO: 0.4 % — SIGNIFICANT CHANGE UP (ref 0–2)
BILIRUB SERPL-MCNC: 0.3 MG/DL — SIGNIFICANT CHANGE UP (ref 0.2–1.2)
BLOOD GAS COMMENTS ARTERIAL: SIGNIFICANT CHANGE UP
BUN SERPL-MCNC: 7 MG/DL — SIGNIFICANT CHANGE UP (ref 7–23)
CALCIUM SERPL-MCNC: 8.3 MG/DL — LOW (ref 8.5–10.1)
CHLORIDE SERPL-SCNC: 111 MMOL/L — HIGH (ref 96–108)
CO2 SERPL-SCNC: 28 MMOL/L — SIGNIFICANT CHANGE UP (ref 22–31)
CREAT SERPL-MCNC: 0.41 MG/DL — LOW (ref 0.5–1.3)
D DIMER BLD IA.RAPID-MCNC: 253 NG/ML DDU — HIGH
EOSINOPHIL # BLD AUTO: 0 K/UL — SIGNIFICANT CHANGE UP (ref 0–0.5)
EOSINOPHIL NFR BLD AUTO: 0 % — SIGNIFICANT CHANGE UP (ref 0–6)
GLUCOSE SERPL-MCNC: 87 MG/DL — SIGNIFICANT CHANGE UP (ref 70–99)
HCO3 BLDA-SCNC: 24 MMOL/L — SIGNIFICANT CHANGE UP (ref 23–27)
HCT VFR BLD CALC: 40.3 % — SIGNIFICANT CHANGE UP (ref 34.5–45)
HGB BLD-MCNC: 13.2 G/DL — SIGNIFICANT CHANGE UP (ref 11.5–15.5)
HOROWITZ INDEX BLDA+IHG-RTO: 50 — SIGNIFICANT CHANGE UP
IMM GRANULOCYTES NFR BLD AUTO: 1.2 % — SIGNIFICANT CHANGE UP (ref 0–1.5)
LYMPHOCYTES # BLD AUTO: 1.05 K/UL — SIGNIFICANT CHANGE UP (ref 1–3.3)
LYMPHOCYTES # BLD AUTO: 15.2 % — SIGNIFICANT CHANGE UP (ref 13–44)
MCHC RBC-ENTMCNC: 32.6 PG — SIGNIFICANT CHANGE UP (ref 27–34)
MCHC RBC-ENTMCNC: 32.8 GM/DL — SIGNIFICANT CHANGE UP (ref 32–36)
MCV RBC AUTO: 99.5 FL — SIGNIFICANT CHANGE UP (ref 80–100)
MONOCYTES # BLD AUTO: 0.88 K/UL — SIGNIFICANT CHANGE UP (ref 0–0.9)
MONOCYTES NFR BLD AUTO: 12.8 % — SIGNIFICANT CHANGE UP (ref 2–14)
NEUTROPHILS # BLD AUTO: 4.86 K/UL — SIGNIFICANT CHANGE UP (ref 1.8–7.4)
NEUTROPHILS NFR BLD AUTO: 70.4 % — SIGNIFICANT CHANGE UP (ref 43–77)
NRBC # BLD: 0 /100 WBCS — SIGNIFICANT CHANGE UP (ref 0–0)
NT-PROBNP SERPL-SCNC: 3539 PG/ML — HIGH (ref 0–125)
PCO2 BLDA: 40 MMHG — SIGNIFICANT CHANGE UP (ref 32–46)
PH BLDA: 7.4 — SIGNIFICANT CHANGE UP (ref 7.35–7.45)
PLATELET # BLD AUTO: 219 K/UL — SIGNIFICANT CHANGE UP (ref 150–400)
PO2 BLDA: 55 MMHG — LOW (ref 74–108)
POTASSIUM SERPL-MCNC: 4 MMOL/L — SIGNIFICANT CHANGE UP (ref 3.5–5.3)
POTASSIUM SERPL-SCNC: 4 MMOL/L — SIGNIFICANT CHANGE UP (ref 3.5–5.3)
PROCALCITONIN SERPL-MCNC: 0.05 NG/ML — HIGH (ref 0–0.04)
PROT SERPL-MCNC: 6.6 G/DL — SIGNIFICANT CHANGE UP (ref 6–8.3)
RBC # BLD: 4.05 M/UL — SIGNIFICANT CHANGE UP (ref 3.8–5.2)
RBC # FLD: 14.6 % — HIGH (ref 10.3–14.5)
SAO2 % BLDA: 88 % — LOW (ref 92–96)
SODIUM SERPL-SCNC: 145 MMOL/L — SIGNIFICANT CHANGE UP (ref 135–145)
WBC # BLD: 6.9 K/UL — SIGNIFICANT CHANGE UP (ref 3.8–10.5)
WBC # FLD AUTO: 6.9 K/UL — SIGNIFICANT CHANGE UP (ref 3.8–10.5)

## 2020-03-30 RX ORDER — ACETAMINOPHEN 500 MG
325 TABLET ORAL ONCE
Refills: 0 | Status: COMPLETED | OUTPATIENT
Start: 2020-03-30 | End: 2020-03-30

## 2020-03-30 RX ORDER — ACETAMINOPHEN 500 MG
650 TABLET ORAL EVERY 4 HOURS
Refills: 0 | Status: DISCONTINUED | OUTPATIENT
Start: 2020-03-30 | End: 2020-04-09

## 2020-03-30 RX ORDER — APIXABAN 2.5 MG/1
5 TABLET, FILM COATED ORAL
Refills: 0 | Status: DISCONTINUED | OUTPATIENT
Start: 2020-03-30 | End: 2020-04-07

## 2020-03-30 RX ORDER — APIXABAN 2.5 MG/1
2.5 TABLET, FILM COATED ORAL
Refills: 0 | Status: DISCONTINUED | OUTPATIENT
Start: 2020-03-30 | End: 2020-03-30

## 2020-03-30 RX ORDER — FUROSEMIDE 40 MG
20 TABLET ORAL ONCE
Refills: 0 | Status: COMPLETED | OUTPATIENT
Start: 2020-03-30 | End: 2020-03-30

## 2020-03-30 RX ADMIN — LACTULOSE 10 GRAM(S): 10 SOLUTION ORAL at 14:44

## 2020-03-30 RX ADMIN — MIDODRINE HYDROCHLORIDE 2.5 MILLIGRAM(S): 2.5 TABLET ORAL at 17:09

## 2020-03-30 RX ADMIN — MIDODRINE HYDROCHLORIDE 2.5 MILLIGRAM(S): 2.5 TABLET ORAL at 11:53

## 2020-03-30 RX ADMIN — Medication 650 MILLIGRAM(S): at 14:46

## 2020-03-30 RX ADMIN — DIVALPROEX SODIUM 375 MILLIGRAM(S): 500 TABLET, DELAYED RELEASE ORAL at 21:45

## 2020-03-30 RX ADMIN — SODIUM CHLORIDE 50 MILLILITER(S): 9 INJECTION INTRAMUSCULAR; INTRAVENOUS; SUBCUTANEOUS at 17:12

## 2020-03-30 RX ADMIN — MONTELUKAST 10 MILLIGRAM(S): 4 TABLET, CHEWABLE ORAL at 21:45

## 2020-03-30 RX ADMIN — Medication 500 MILLIGRAM(S): at 17:09

## 2020-03-30 RX ADMIN — TIOTROPIUM BROMIDE 1 CAPSULE(S): 18 CAPSULE ORAL; RESPIRATORY (INHALATION) at 11:53

## 2020-03-30 RX ADMIN — Medication 325 MILLIGRAM(S): at 18:08

## 2020-03-30 RX ADMIN — HEPARIN SODIUM 5000 UNIT(S): 5000 INJECTION INTRAVENOUS; SUBCUTANEOUS at 05:26

## 2020-03-30 RX ADMIN — Medication 2000 UNIT(S): at 17:09

## 2020-03-30 RX ADMIN — SENNA PLUS 2 TABLET(S): 8.6 TABLET ORAL at 21:45

## 2020-03-30 RX ADMIN — Medication 40 MILLIGRAM(S): at 17:09

## 2020-03-30 RX ADMIN — Medication 1 TABLET(S): at 11:53

## 2020-03-30 RX ADMIN — DIVALPROEX SODIUM 250 MILLIGRAM(S): 500 TABLET, DELAYED RELEASE ORAL at 11:53

## 2020-03-30 RX ADMIN — ALBUTEROL 2 PUFF(S): 90 AEROSOL, METERED ORAL at 05:27

## 2020-03-30 RX ADMIN — PANTOPRAZOLE SODIUM 40 MILLIGRAM(S): 20 TABLET, DELAYED RELEASE ORAL at 11:53

## 2020-03-30 RX ADMIN — LACTULOSE 10 GRAM(S): 10 SOLUTION ORAL at 21:45

## 2020-03-30 RX ADMIN — LATANOPROST 1 DROP(S): 0.05 SOLUTION/ DROPS OPHTHALMIC; TOPICAL at 21:45

## 2020-03-30 RX ADMIN — SODIUM CHLORIDE 100 MILLILITER(S): 9 INJECTION INTRAMUSCULAR; INTRAVENOUS; SUBCUTANEOUS at 05:30

## 2020-03-30 RX ADMIN — APIXABAN 5 MILLIGRAM(S): 2.5 TABLET, FILM COATED ORAL at 14:58

## 2020-03-30 NOTE — CHART NOTE - NSCHARTNOTEFT_GEN_A_CORE
I spoke to pulmonary and id  to see the patient  I discussed findings  I spoke to the icu earlier today to alert them that patient may decompensate needing intubation at some point

## 2020-03-30 NOTE — CONSULT NOTE ADULT - ASSESSMENT
59f with cerebral palsy, seizure, copd  admitted with hypoxia  COVID 19+  nlr 2.86
PATIENT SUMMARY      59 f from group home COVID positive admitted 3/29 with fveer SOB Pulm consulted 3/30/2020 3:04 PM   PMH  A fib CHADSVasc score 3 (female PAD Hytn)  chronic diastolic heart failure  cerebral palsy mental retardation epilepsy osteioporosis scolisosis                PATIENT DATA/ASSESSMENT/RECOMMENDATIONS       FEVER OPACITIES POA   A/R  COVID  POA 3/29            POSSIBLE BACTERIAL PNEUMONIA POA   A/R Follow labs incl blod c   ID on case abio deferred     COVID POA  A/R Follow COVID  pcr   Monitor risk strn and Inflamm markers    CRP D-dimer Procal LDH Lact CK Trop ESR Ferritin lfts qtc      Supportive care           Avoid bpap nebulizers hfnc Plan early intubation    RISK STRATN (Fav/Unfav)  SYMPTOM ONSET   N NLR (3-5) 3/30/2020 NLR 4.8 F   A Age (</>50) A 59 U     COPD ex   M montelukast (3/29)   M spiriva (3/29)     HYPOTENSION  M Midodrine 2.5x3 (3/29)   A/R Target MAP 65     CHF  M lasix 20 (3/30)   A/R 3/30/2020 Change IV to 50     SZ  Depakote (3/30)    NONVIOLENT NONSELFDESTR LEVEL ONE RESTR   Implemented 3/30/2020     RESP GAS EXCHANGE   A/R Target PO 90-95%   3/30/2020 50% vm 740/40/55   A/R Gas exchange acceptable  If oxygenation worsens may need intubation        BRIEF ASSESSMENT PLAN      59 F HO CP MR PA fib c [facility] ADMITTED 3/29 WITH COVID PNEUMONIA RESP FAILURE   RESP FAILURE IF WORSE ICU  COVID 19 RX AS POER ID   COPD ON BD   PA fib OPNA PIXABAN   DCHF MONITORED           TIME SPENT Over 55 minutes aggregate care time spent on encounter; activities included   direct pt care, counseling and/or coordinating care reviewing notes, lab data/ imaging , discussion with multidisciplinary team/ pt /family. Risks, benefits, alternatives  discussed in detail.    JAYA BEJARANO [facility] P [phone]  1960 DOA 3/30/2020  DR MODESTA RUBY
59F with known PAF currently in atrial fibrillation in the setting of cerebral palsy, Mental retardation, COPD, PAD.  She has a CHADSVasc Score of 3 (Female, PAD, HTN).  Heart rates ok overnight but this am increased to 130's.      Suggest:  1. Start Eliquis 2.5mg twice daily.  Would follow CBC daily while in the hospital and every 2 -3 months after discharge.  2. Continue with Furosemide for chronic diastolic HF  3. Will get echo at a later time.  No acute CHF at this time.  4. Supportive care for COVID treatment  5. Will D/C heparin SQ.  6. Continue with Verapamil for rate control in the setting of COPD.  7. Will follow as needed in the setting of COVID.  8. Above discussed with PCP.

## 2020-03-30 NOTE — CONSULT NOTE ADULT - SUBJECTIVE AND OBJECTIVE BOX
[facility], Division of Infectious Diseases  CHERRI Salas A. Lee  [phone]  -JAYA, CARLEE  59y, Female  [phone]      HPI: pt cognitive dysfunction cannot give history    59f with cp, copd, seizure, admitted with hypoxia   Pt is covid 19 positive from a group home.        PMH/PSH--  Osteoporosis  CP (cerebral palsy)  Scoliosis  Mentally challenged  Epilepsy  COPD (chronic obstructive pulmonary disease)  PVD (peripheral vascular disease)  Osteoporosis  Chest pain  Scoliosis  Seizure  MR (mental retardation), severe        Allergies--phenobaribital  and others listed      Medications--  Antibiotics:   Immunologic:   Other: acetaminophen  Suppository ..  acetaminophen  Suppository .. PRN  acetaminophen  Suppository .. PRN  ALBUTerol    90 MICROgram(s) HFA Inhaler  apixaban  ascorbic acid  calcium carbonate 1250 mG  + Vitamin D (OsCal 500 + D)  cholecalciferol  diVALproex Sprinkle  diVALproex Sprinkle  furosemide    Tablet  lactulose Syrup  latanoprost 0.005% Ophthalmic Solution  LORazepam   Injectable PRN  midodrine.  montelukast  pantoprazole   Suspension  potassium chloride   Powder  senna  sodium chloride 0.9%.  tiotropium 18 MICROgram(s) Capsule  verapamil      Social History--  NC    Family/Marital History--  NC  Remainder not relevant to clinical concern.    Travel/Environmental/Occupational History:  NC    Review of Systems:  A >=10-point review of systems was obtained.     unable to obtain  Review of systems otherwise negative except as previously noted.    Physical Exam--  Vital Signs: T(F): 101.9 (03-30-20 @ 13:42), Max: 101.9 (03-30-20 @ 13:42)  HR: 114 (03-30-20 @ 13:42)  BP: 129/63 (03-30-20 @ 13:42)  RR: 22 (03-30-20 @ 13:04)  SpO2: 93% (03-30-20 @ 13:04)  Wt(kg): --  General: Nontoxic-appearing Female in no acute distress.  HEENT: AT/NC.  Neck: Not rigid. No sense of mass.  Nodes: None palpable.  Lungs: noncompliant   Heart: Regular rate and rhythm.  Abdomen: Bowel sounds present and normoactive. Soft. Nondistended  Back: No spinal tenderness. No costovertebral angle tenderness.   Extremities: No cyanosis or clubbing. No edema.   Skin: Warm. Dry. Good turgor. No rash. No vasculitic stigmata.  Psychiatric: cognitive dysfunction        Laboratory & Imaging Data--  CBC                        12.2   6.05  )-----------( 159      ( 29 Mar 2020 16:22 )             38.0       Chemistries  03-29    146<H>  |  116<H>  |  14  ----------------------------<  90  3.8   |  26  |  0.42<L>    Ca    7.9<L>      29 Mar 2020 16:22    TPro  5.9<L>  /  Alb  2.5<L>  /  TBili  0.2  /  DBili  x   /  AST  33  /  ALT  25  /  AlkPhos  43  03-29      Culture Data    Culture - Blood (collected 29 Mar 2020 10:42)  Source: .Blood Blood-Peripheral  Preliminary Report (30 Mar 2020 11:02):    No growth to date.    Culture - Blood (collected 29 Mar 2020 10:42)  Source: .Blood Blood-Peripheral  Preliminary Report (30 Mar 2020 11:02):    No growth to date.            < from: Xray Chest 1 View AP/PA (03.29.20 @ 00:37) >    EXAM:  XR CHEST AP OR PA 1V                            PROCEDURE DATE:  03/29/2020          INTERPRETATION:  Exam Date: 3/29/2020 12:37 AM    History: Shortness of breath, cough, fever    Technique: Single frontal portable view of the chest with comparison to  12/9/2018    Findings:    Heart is enlarged. Marked elevation of the left hemidiaphragm. Increased bilateral perihilar markings appears unchanged however multifocal pneumonia cannot be excluded. Thoracolumbar scoliosis convex to the right. Degenerative changes of the visualized osseous structures.        Impression:     Increased bilateral perihilar markings appears unchanged however multifocal pneumonia cannot be excluded        < end of copied text >      Procalcitonin, Serum (03.29.20 @ 00:27)    Procalcitonin, Serum: 0.11: Procalcitonin (PCT) Interpretation (ng/mL) - Diagnosis of systemic  bacterial infection/sepsis  PCT < 0.5: Systemic infection (sepsis) is not likely and risk for  progression to severe systemic infection is low. Local bacterial  infection is possible. If early sepsis is suspected clinically, PCT  should be re-assessed in 6-24 hours.  PCT >/= 0.5 but < 2.0: Systemic infection (sepsis) is possible, but other  conditions are known to elevate PCT as well. Moderate risk for  progression to severe systemic infection. The patient should be closely  monitored both clinically and by re-assessing PCT within 6-24 hours.  PCT >/= 2.0 but < 10.0: Systemic infection (sepsis) is likely, unless  other causes are known. High risk of progression to severe systemic  infection (severe sepsis/septic shock).  PCT >/= 10.0: Important systemic inflammatory response, almost  exclusively due to severe bacterial sepsis or septic shock. High  likelihood of severe sepsis or septic shock. ng/mL [facility], Division of Infectious Diseases  CHERRI Salas A. Lee  [phone]  -JAYA, CARLEE  59y, Female  [phone]      HPI: pt cognitive dysfunction cannot give history    59f with cp, copd, seizure, admitted with hypoxia   Pt is covid 19 positive from a group home.        PMH/PSH--  Osteoporosis  CP (cerebral palsy)  Scoliosis  Mentally challenged  Epilepsy  COPD (chronic obstructive pulmonary disease)  PVD (peripheral vascular disease)  Osteoporosis  Chest pain  Scoliosis  Seizure  MR (mental retardation), severe        Allergies--phenobaribital  and others listed      Medications--  Antibiotics:   Immunologic:   Other: acetaminophen  Suppository ..  acetaminophen  Suppository .. PRN  acetaminophen  Suppository .. PRN  ALBUTerol    90 MICROgram(s) HFA Inhaler  apixaban  ascorbic acid  calcium carbonate 1250 mG  + Vitamin D (OsCal 500 + D)  cholecalciferol  diVALproex Sprinkle  diVALproex Sprinkle  furosemide    Tablet  lactulose Syrup  latanoprost 0.005% Ophthalmic Solution  LORazepam   Injectable PRN  midodrine.  montelukast  pantoprazole   Suspension  potassium chloride   Powder  senna  sodium chloride 0.9%.  tiotropium 18 MICROgram(s) Capsule  verapamil      Social History--  NC    Family/Marital History--  NC  Remainder not relevant to clinical concern.    Travel/Environmental/Occupational History:  NC    Review of Systems:  A >=10-point review of systems was obtained.     unable to obtain  Review of systems otherwise negative except as previously noted.    Physical Exam--  Vital Signs: T(F): 101.9 (03-30-20 @ 13:42), Max: 101.9 (03-30-20 @ 13:42)  HR: 114 (03-30-20 @ 13:42)  BP: 129/63 (03-30-20 @ 13:42)  RR: 22 (03-30-20 @ 13:04)  SpO2: 93% (03-30-20 @ 13:04)  Wt(kg): --  General: calling out  HEENT: AT/NC.  Neck: Not rigid. No sense of mass.  Nodes: None palpable.  Lungs: noncompliant   Heart: Regular rate and rhythm.  Abdomen: Bowel sounds present and normoactive. Soft. Nondistended  Back: No spinal tenderness. No costovertebral angle tenderness.   Extremities: No cyanosis or clubbing. No edema.   Skin: Warm. Dry. Good turgor. No rash. No vasculitic stigmata.  Psychiatric: cognitive dysfunction a bit agitated        Laboratory & Imaging Data--  CBC                        12.2   6.05  )-----------( 159      ( 29 Mar 2020 16:22 )             38.0       Chemistries  03-29    146<H>  |  116<H>  |  14  ----------------------------<  90  3.8   |  26  |  0.42<L>    Ca    7.9<L>      29 Mar 2020 16:22    TPro  5.9<L>  /  Alb  2.5<L>  /  TBili  0.2  /  DBili  x   /  AST  33  /  ALT  25  /  AlkPhos  43  03-29      Culture Data    Culture - Blood (collected 29 Mar 2020 10:42)  Source: .Blood Blood-Peripheral  Preliminary Report (30 Mar 2020 11:02):    No growth to date.    Culture - Blood (collected 29 Mar 2020 10:42)  Source: .Blood Blood-Peripheral  Preliminary Report (30 Mar 2020 11:02):    No growth to date.            < from: Xray Chest 1 View AP/PA (03.29.20 @ 00:37) >    EXAM:  XR CHEST AP OR PA 1V                            PROCEDURE DATE:  03/29/2020          INTERPRETATION:  Exam Date: 3/29/2020 12:37 AM    History: Shortness of breath, cough, fever    Technique: Single frontal portable view of the chest with comparison to  12/9/2018    Findings:    Heart is enlarged. Marked elevation of the left hemidiaphragm. Increased bilateral perihilar markings appears unchanged however multifocal pneumonia cannot be excluded. Thoracolumbar scoliosis convex to the right. Degenerative changes of the visualized osseous structures.        Impression:     Increased bilateral perihilar markings appears unchanged however multifocal pneumonia cannot be excluded        < end of copied text >      Procalcitonin, Serum (03.29.20 @ 00:27)    Procalcitonin, Serum: 0.11: Procalcitonin (PCT) Interpretation (ng/mL) - Diagnosis of systemic  bacterial infection/sepsis  PCT < 0.5: Systemic infection (sepsis) is not likely and risk for  progression to severe systemic infection is low. Local bacterial  infection is possible. If early sepsis is suspected clinically, PCT  should be re-assessed in 6-24 hours.  PCT >/= 0.5 but < 2.0: Systemic infection (sepsis) is possible, but other  conditions are known to elevate PCT as well. Moderate risk for  progression to severe systemic infection. The patient should be closely  monitored both clinically and by re-assessing PCT within 6-24 hours.  PCT >/= 2.0 but < 10.0: Systemic infection (sepsis) is likely, unless  other causes are known. High risk of progression to severe systemic  infection (severe sepsis/septic shock).  PCT >/= 10.0: Important systemic inflammatory response, almost  exclusively due to severe bacterial sepsis or septic shock. High  likelihood of severe sepsis or septic shock. ng/mL

## 2020-03-30 NOTE — CONSULT NOTE ADULT - SUBJECTIVE AND OBJECTIVE BOX
JAYA BEJARANO [facility] P [phone]  1960 DOA 3/30/2020  DR MODESTA ESCAMILLA   ALLERGY      colace phenobarb   CONTACT      mother Ada NEGRETE [phone]                 Initial evaluation/Pulmonary Critical Care consultation requested on  3/30/2020  by Dr Escamilla  from Dr Trevizo   Patient examined chart reviewed    HOSPITAL ADMISSION   PATIENT CAME  FROM (if information available)      JAYA BEJARANO [facility] P [phone]  1960 DOA 3/30/2020  DR MODESTA ESCAMILLA     REVIEW OF SYMPTOMS      Able to give ROS  NO     PHYSICAL EXAM    HEENT Unremarkable PERRLA atraumatic   RESP Fair air entry EXP prolonged    Harsh breath sound Resp distres mild   CARDIAC S1 S2 No S3     NO JVD    ABDOMEN SOFT BS PRESENT NOT DISTENDED No hepatosplenomegaly PEDAL EDEMA present No calf tenderness  NO rash   GENERAL Not TOXIC looking    VITALS/LABS       3/30/2020 101 114 120/60   3/30/2020 W 6.9 Hb 13.2 Plt 219   3/29/2020 Na 146 K 3.8 CO2 26 Cr .4     PT DATA/BEST PRACTICE  ALLERGY     NOTEWORTHY  POINTS/CHANGES ROS/PE                                  WT  59 (3/30/2020)                    BMI   27 (3/30/2020)        ADVANCED DIRECTIVE       Goals of care discussion                                                                                      HEAD OF BED ELEVATION Yes  DYSPHAGIA EVAL                                  DIET      dys 1 puree   honey (3/29)                                  IV F   ns 100 (3/29)                                                     DVT PROPHYLAXIS   M apixa 5.2 (3/30)  ( a fib)                  STRESS ULCER PROPHYLAXIS                                                                  INFECTION PPLX    ECHO                  CXR         3/30/2020 cxr incr bl perihilar markings                   CT                                                                              MICROBIO         3/30/2020 Blod c n           ABIO                PROCEDURE                               COVID-19  SYMPTOM ONSET  PRE DC AFEBRILE DATES                          RESP   O  A   3/30/2020 50% vm 740/40/55   W    3/30/2020 w 6.9                       H 3/30/2020 Hb 13.2   N    3/30/2020 Neutr 4.8           l  3/30/2020 lymp 1.05  NLR      4.8

## 2020-03-30 NOTE — CONSULT NOTE ADULT - REASON FOR ADMISSION
covid19+  was sent for low oxygen

## 2020-03-30 NOTE — CONSULT NOTE ADULT - PROBLEM SELECTOR RECOMMENDATION 9
pt with viral pna fevers  cont supportive care-- oxygen   keep oxygen > 90  avoid steroids if possible  would position prone to side to improve ventilation  fevers are part of course underlying disease  cont precautions contact and airborne  monitor  cbc with diff, ldh, ferritin, crp and ddimer.  no indication for antibx right now.

## 2020-03-30 NOTE — PHARMACOTHERAPY INTERVENTION NOTE - COMMENTS
Patient meets criteria for 5mg apixaban dose (scr 0.42, 60yo, 59kg).  Discussed with Dr. Recinos.  Changed to 5mg per telephone order.

## 2020-03-30 NOTE — CONSULT NOTE ADULT - SUBJECTIVE AND OBJECTIVE BOX
[facility] Cardiology Consult - Michelle Recinos Tsiakos Ginnamanuel [phone]    CHIEF COMPLAINT: Patient is a 59y old  Female who presents with a chief complaint of covid19+  was sent for low oxygen (29 Mar 2020 14:45)      HPI:  patient was tested +covid19 few days ago  was sent by house for low oxygen (29 Mar 2020 01:07)      PAST MEDICAL & SURGICAL HISTORY:  Osteoporosis  CP (cerebral palsy)  Scoliosis  Mentally challenged  Epilepsy  COPD (chronic obstructive pulmonary disease)  PVD (peripheral vascular disease)  Osteoporosis  Chest pain  Scoliosis  Seizure  MR (mental retardation), severe: immobilty sec to severe dissability  No significant past surgical history      SOCIAL HISTORY: Alochol: Denied  Smoking: Nonsmoker  Drug Use: Denied  Marital Status:         FAMILY HISTORY: FAMILY HISTORY:      MEDICATIONS  (STANDING):  acetaminophen  Suppository .. 650 milliGRAM(s) Rectal once  ALBUTerol    90 MICROgram(s) HFA Inhaler 2 Puff(s) Inhalation every 6 hours  ascorbic acid 500 milliGRAM(s) Oral two times a day  calcium carbonate 1250 mG  + Vitamin D (OsCal 500 + D) 1 Tablet(s) Oral daily  cholecalciferol 2000 Unit(s) Oral two times a day  diVALproex Sprinkle 375 milliGRAM(s) Oral at bedtime  diVALproex Sprinkle 250 milliGRAM(s) Oral daily  furosemide    Tablet 20 milliGRAM(s) Oral daily  heparin  Injectable 5000 Unit(s) SubCutaneous every 12 hours  lactulose Syrup 10 Gram(s) Oral three times a day  latanoprost 0.005% Ophthalmic Solution 1 Drop(s) Both EYES at bedtime  midodrine. 2.5 milliGRAM(s) Oral three times a day  montelukast 10 milliGRAM(s) Oral at bedtime  pantoprazole   Suspension 40 milliGRAM(s) Oral daily  potassium chloride   Powder 40 milliEquivalent(s) Oral once  senna 2 Tablet(s) Oral at bedtime  sodium chloride 0.9%. 1000 milliLiter(s) (100 mL/Hr) IV Continuous <Continuous>  tiotropium 18 MICROgram(s) Capsule 1 Capsule(s) Inhalation daily  verapamil 40 milliGRAM(s) Oral two times a day    MEDICATIONS  (PRN):  LORazepam   Injectable 0.5 milliGRAM(s) IV Push every 3 hours PRN acute seizures      Allergies    barbiturates (Unknown)  Colace (Unknown)  docusate (Unknown)  phenobarbital (Unknown)    Intolerances        REVIEW OF SYSTEMS:  CONSTITUTIONAL: No weakness, fevers or chills  EYES/ENT: No visual changes;  No vertigo or throat pain   NECK: No pain or stiffness  RESPIRATORY: No cough, wheezing, hemoptysis; No shortness of breath  CARDIOVASCULAR: No chest pain or palpitations  GASTROINTESTINAL: No abdominal pain. No nausea, vomiting, or hematemesis; No diarrhea or constipation. No melena or hematochezia.  GENITOURINARY: No dysuria, frequency or hematuria  NEUROLOGICAL: No numbness or weakness  SKIN: No itching or rash  All other review of systems is negative unless indicated above    VITAL SIGNS:   Vital Signs Last 24 Hrs  T(C): 37.4 (30 Mar 2020 04:38), Max: 37.4 (30 Mar 2020 04:38)  T(F): 99.4 (30 Mar 2020 04:38), Max: 99.4 (30 Mar 2020 04:38)  HR: 95 (30 Mar 2020 04:38) (60 - 95)  BP: 129/72 (30 Mar 2020 04:38) (106/65 - 129/72)  BP(mean): 77 (29 Mar 2020 14:04) (77 - 77)  RR: 18 (30 Mar 2020 04:38) (18 - 19)  SpO2: 95% (30 Mar 2020 04:38) (89% - 95%)    I&O's Summary      PHYSICAL EXAM:  Constitutional: Awake in NAD  HEENT:  BELTRAN, EOMI  Neck: No JVD  Pulmonary: CTA B/L No R/R/W  Cardiovascular: PMI not palpable non-displaced Regular S1 and S2, no murmurs, rubs, gallops or clicks  Gastrointestinal: Bowel Sounds present, soft, nontender.   Extremities: Trace peripheral edema.   Neuro: No gross focal deficits    LABS: All Labs Reviewed:                        12.2   6.05  )-----------( 159      ( 29 Mar 2020 16:22 )             38.0                         13.6   4.70  )-----------( 158      ( 29 Mar 2020 00:27 )             40.9     29 Mar 2020 16:22    146    |  116    |  14     ----------------------------<  90     3.8     |  26     |  0.42   29 Mar 2020 00:27    143    |  105    |  14     ----------------------------<  90     3.2     |  33     |  0.65     Ca    7.9        29 Mar 2020 16:22  Ca    9.2        29 Mar 2020 00:27    TPro  5.9    /  Alb  2.5    /  TBili  0.2    /  DBili  x      /  AST  33     /  ALT  25     /  AlkPhos  43     29 Mar 2020 16:22  TPro  6.7    /  Alb  3.0    /  TBili  0.2    /  DBili  x      /  AST  32     /  ALT  28     /  AlkPhos  47     29 Mar 2020 00:27    PT/INR - ( 29 Mar 2020 00:27 )   PT: 12.5 sec;   INR: 1.11 ratio         PTT - ( 29 Mar 2020 00:27 )  PTT:25.1 sec      Blood Culture:         RADIOLOGY/EKG: [facility] Cardiology Consult - Michelle Recinos TsiakosJaspreet [phone]    CHIEF COMPLAINT: Patient is a 59y old  Female who presents with a chief complaint of covid19+  was sent for low oxygen (29 Mar 2020 14:45).  She is unable to verbalize complaint or provide history. No events overnight.  She remains in persistent afib on tele monitor.  Rates were well controlled overnight but now increased to 130's.      HPI:  patient was tested +covid19 few days ago  was sent by house for low oxygen (29 Mar 2020 01:07)      PAST MEDICAL & SURGICAL HISTORY:  Osteoporosis  CP (cerebral palsy)  Scoliosis  Mentally challenged  Epilepsy  COPD (chronic obstructive pulmonary disease)  PVD (peripheral vascular disease)  Osteoporosis  Chest pain  Scoliosis  Seizure  MR (mental retardation), severe: immobilty sec to severe dissability  No significant past surgical history      SOCIAL HISTORY: Alochol: Denied  Smoking: Nonsmoker  Drug Use: Denied  Marital Status:         FAMILY HISTORY: FAMILY HISTORY:      MEDICATIONS  (STANDING):  acetaminophen  Suppository .. 650 milliGRAM(s) Rectal once  ALBUTerol    90 MICROgram(s) HFA Inhaler 2 Puff(s) Inhalation every 6 hours  ascorbic acid 500 milliGRAM(s) Oral two times a day  calcium carbonate 1250 mG  + Vitamin D (OsCal 500 + D) 1 Tablet(s) Oral daily  cholecalciferol 2000 Unit(s) Oral two times a day  diVALproex Sprinkle 375 milliGRAM(s) Oral at bedtime  diVALproex Sprinkle 250 milliGRAM(s) Oral daily  furosemide    Tablet 20 milliGRAM(s) Oral daily  heparin  Injectable 5000 Unit(s) SubCutaneous every 12 hours  lactulose Syrup 10 Gram(s) Oral three times a day  latanoprost 0.005% Ophthalmic Solution 1 Drop(s) Both EYES at bedtime  midodrine. 2.5 milliGRAM(s) Oral three times a day  montelukast 10 milliGRAM(s) Oral at bedtime  pantoprazole   Suspension 40 milliGRAM(s) Oral daily  potassium chloride   Powder 40 milliEquivalent(s) Oral once  senna 2 Tablet(s) Oral at bedtime  sodium chloride 0.9%. 1000 milliLiter(s) (100 mL/Hr) IV Continuous <Continuous>  tiotropium 18 MICROgram(s) Capsule 1 Capsule(s) Inhalation daily  verapamil 40 milliGRAM(s) Oral two times a day    MEDICATIONS  (PRN):  LORazepam   Injectable 0.5 milliGRAM(s) IV Push every 3 hours PRN acute seizures      Allergies    barbiturates (Unknown)  Colace (Unknown)  docusate (Unknown)  phenobarbital (Unknown)    Intolerances        REVIEW OF SYSTEMS:  CONSTITUTIONAL: No weakness, fevers or chills  EYES/ENT: No visual changes;  No vertigo or throat pain   NECK: No pain or stiffness  RESPIRATORY: No cough, wheezing, hemoptysis; No shortness of breath  CARDIOVASCULAR: No chest pain or palpitations  GASTROINTESTINAL: No abdominal pain. No nausea, vomiting, or hematemesis; No diarrhea or constipation. No melena or hematochezia.  GENITOURINARY: No dysuria, frequency or hematuria  NEUROLOGICAL: No numbness or weakness  SKIN: No itching or rash  All other review of systems is negative unless indicated above    VITAL SIGNS:   Vital Signs Last 24 Hrs  T(C): 37.4 (30 Mar 2020 04:38), Max: 37.4 (30 Mar 2020 04:38)  T(F): 99.4 (30 Mar 2020 04:38), Max: 99.4 (30 Mar 2020 04:38)  HR: 95 (30 Mar 2020 04:38) (60 - 95)  BP: 129/72 (30 Mar 2020 04:38) (106/65 - 129/72)  BP(mean): 77 (29 Mar 2020 14:04) (77 - 77)  RR: 18 (30 Mar 2020 04:38) (18 - 19)  SpO2: 95% (30 Mar 2020 04:38) (89% - 95%)    I&O's Summary      PHYSICAL EXAM:  Constitutional: Awake in NAD  HEENT:  BELTRAN, EOMI  Neck: No JVD  Pulmonary: CTA B/L No R/R/W  Cardiovascular: PMI not palpable non-displaced, irregularly irregular S1 and S2, no murmurs, rubs, gallops or clicks  Gastrointestinal: Bowel Sounds present, soft, nontender.   Extremities: Trace peripheral edema.   Neuro: No gross focal deficits    LABS: All Labs Reviewed:                        12.2   6.05  )-----------( 159      ( 29 Mar 2020 16:22 )             38.0                         13.6   4.70  )-----------( 158      ( 29 Mar 2020 00:27 )             40.9     29 Mar 2020 16:22    146    |  116    |  14     ----------------------------<  90     3.8     |  26     |  0.42   29 Mar 2020 00:27    143    |  105    |  14     ----------------------------<  90     3.2     |  33     |  0.65     Ca    7.9        29 Mar 2020 16:22  Ca    9.2        29 Mar 2020 00:27    TPro  5.9    /  Alb  2.5    /  TBili  0.2    /  DBili  x      /  AST  33     /  ALT  25     /  AlkPhos  43     29 Mar 2020 16:22  TPro  6.7    /  Alb  3.0    /  TBili  0.2    /  DBili  x      /  AST  32     /  ALT  28     /  AlkPhos  47     29 Mar 2020 00:27    PT/INR - ( 29 Mar 2020 00:27 )   PT: 12.5 sec;   INR: 1.11 ratio         PTT - ( 29 Mar 2020 00:27 )  PTT:25.1 sec      Blood Culture:         RADIOLOGY/EKG:  < from: 12 Lead ECG (03.29.20 @ 00:10) >  Diagnosis Line Atrial fibrillation  Nonspecific ST and T wave abnormality  Prolonged QT  Abnormal ECG  When compared with ECG of 02-DEC-2019 07:54,  No significant change was found  Confirmed by Denys Al MD ([id number]) on 3/29/2020 1:51:29 PM    < end of copied text >    Diagnosis Line Atrial fibrillation  Nonspecific ST and T wave abnormality  Abnormal ECG  No previous ECGs available  Confirmed by RENITA SOSA ([id number]) on 12/2/2019 1:36:47 PM

## 2020-03-31 LAB
ALBUMIN SERPL ELPH-MCNC: 2.4 G/DL — LOW (ref 3.3–5)
ALP SERPL-CCNC: 48 U/L — SIGNIFICANT CHANGE UP (ref 40–120)
ALT FLD-CCNC: 23 U/L — SIGNIFICANT CHANGE UP (ref 12–78)
ANION GAP SERPL CALC-SCNC: 5 MMOL/L — SIGNIFICANT CHANGE UP (ref 5–17)
AST SERPL-CCNC: 36 U/L — SIGNIFICANT CHANGE UP (ref 15–37)
BASOPHILS # BLD AUTO: 0.04 K/UL — SIGNIFICANT CHANGE UP (ref 0–0.2)
BASOPHILS NFR BLD AUTO: 0.3 % — SIGNIFICANT CHANGE UP (ref 0–2)
BILIRUB SERPL-MCNC: 0.4 MG/DL — SIGNIFICANT CHANGE UP (ref 0.2–1.2)
BUN SERPL-MCNC: 7 MG/DL — SIGNIFICANT CHANGE UP (ref 7–23)
CALCIUM SERPL-MCNC: 8.8 MG/DL — SIGNIFICANT CHANGE UP (ref 8.5–10.1)
CHLORIDE SERPL-SCNC: 111 MMOL/L — HIGH (ref 96–108)
CO2 SERPL-SCNC: 29 MMOL/L — SIGNIFICANT CHANGE UP (ref 22–31)
CREAT SERPL-MCNC: 0.32 MG/DL — LOW (ref 0.5–1.3)
EOSINOPHIL # BLD AUTO: 0 K/UL — SIGNIFICANT CHANGE UP (ref 0–0.5)
EOSINOPHIL NFR BLD AUTO: 0 % — SIGNIFICANT CHANGE UP (ref 0–6)
ERYTHROCYTE [SEDIMENTATION RATE] IN BLOOD: 29 MM/HR — HIGH (ref 0–20)
FERRITIN SERPL-MCNC: 595 NG/ML — HIGH (ref 15–150)
GLUCOSE SERPL-MCNC: 105 MG/DL — HIGH (ref 70–99)
HCT VFR BLD CALC: 39 % — SIGNIFICANT CHANGE UP (ref 34.5–45)
HGB BLD-MCNC: 12.7 G/DL — SIGNIFICANT CHANGE UP (ref 11.5–15.5)
IMM GRANULOCYTES NFR BLD AUTO: 0.9 % — SIGNIFICANT CHANGE UP (ref 0–1.5)
INR BLD: 1.35 RATIO — HIGH (ref 0.88–1.16)
LDH SERPL L TO P-CCNC: 496 U/L — HIGH (ref 50–242)
LYMPHOCYTES # BLD AUTO: 1.11 K/UL — SIGNIFICANT CHANGE UP (ref 1–3.3)
LYMPHOCYTES # BLD AUTO: 9.3 % — LOW (ref 13–44)
MCHC RBC-ENTMCNC: 32.4 PG — SIGNIFICANT CHANGE UP (ref 27–34)
MCHC RBC-ENTMCNC: 32.6 GM/DL — SIGNIFICANT CHANGE UP (ref 32–36)
MCV RBC AUTO: 99.5 FL — SIGNIFICANT CHANGE UP (ref 80–100)
MONOCYTES # BLD AUTO: 1.35 K/UL — HIGH (ref 0–0.9)
MONOCYTES NFR BLD AUTO: 11.3 % — SIGNIFICANT CHANGE UP (ref 2–14)
NEUTROPHILS # BLD AUTO: 9.29 K/UL — HIGH (ref 1.8–7.4)
NEUTROPHILS NFR BLD AUTO: 78.2 % — HIGH (ref 43–77)
NRBC # BLD: 0 /100 WBCS — SIGNIFICANT CHANGE UP (ref 0–0)
PLATELET # BLD AUTO: 275 K/UL — SIGNIFICANT CHANGE UP (ref 150–400)
POTASSIUM SERPL-MCNC: 3.2 MMOL/L — LOW (ref 3.5–5.3)
POTASSIUM SERPL-SCNC: 3.2 MMOL/L — LOW (ref 3.5–5.3)
PROT SERPL-MCNC: 6.2 G/DL — SIGNIFICANT CHANGE UP (ref 6–8.3)
PROTHROM AB SERPL-ACNC: 15.3 SEC — HIGH (ref 10–12.9)
RBC # BLD: 3.92 M/UL — SIGNIFICANT CHANGE UP (ref 3.8–5.2)
RBC # FLD: 14.5 % — SIGNIFICANT CHANGE UP (ref 10.3–14.5)
SODIUM SERPL-SCNC: 145 MMOL/L — SIGNIFICANT CHANGE UP (ref 135–145)
WBC # BLD: 11.9 K/UL — HIGH (ref 3.8–10.5)
WBC # FLD AUTO: 11.9 K/UL — HIGH (ref 3.8–10.5)

## 2020-03-31 RX ORDER — POTASSIUM CHLORIDE 20 MEQ
10 PACKET (EA) ORAL
Refills: 0 | Status: COMPLETED | OUTPATIENT
Start: 2020-03-31 | End: 2020-03-31

## 2020-03-31 RX ORDER — VALPROIC ACID (AS SODIUM SALT) 250 MG/5ML
250 SOLUTION, ORAL ORAL THREE TIMES A DAY
Refills: 0 | Status: DISCONTINUED | OUTPATIENT
Start: 2020-03-31 | End: 2020-04-09

## 2020-03-31 RX ORDER — VERAPAMIL HCL 240 MG
80 CAPSULE, EXTENDED RELEASE PELLETS 24 HR ORAL
Refills: 0 | Status: DISCONTINUED | OUTPATIENT
Start: 2020-03-31 | End: 2020-04-05

## 2020-03-31 RX ADMIN — LATANOPROST 1 DROP(S): 0.05 SOLUTION/ DROPS OPHTHALMIC; TOPICAL at 22:28

## 2020-03-31 RX ADMIN — MIDODRINE HYDROCHLORIDE 2.5 MILLIGRAM(S): 2.5 TABLET ORAL at 17:50

## 2020-03-31 RX ADMIN — Medication 50 MILLIEQUIVALENT(S): at 19:04

## 2020-03-31 RX ADMIN — Medication 80 MILLIGRAM(S): at 17:50

## 2020-03-31 RX ADMIN — SODIUM CHLORIDE 50 MILLILITER(S): 9 INJECTION INTRAMUSCULAR; INTRAVENOUS; SUBCUTANEOUS at 22:26

## 2020-03-31 RX ADMIN — LACTULOSE 10 GRAM(S): 10 SOLUTION ORAL at 04:10

## 2020-03-31 RX ADMIN — LACTULOSE 10 GRAM(S): 10 SOLUTION ORAL at 22:26

## 2020-03-31 RX ADMIN — MONTELUKAST 10 MILLIGRAM(S): 4 TABLET, CHEWABLE ORAL at 22:27

## 2020-03-31 RX ADMIN — Medication 50 MILLIEQUIVALENT(S): at 17:49

## 2020-03-31 RX ADMIN — Medication 1 TABLET(S): at 12:35

## 2020-03-31 RX ADMIN — Medication 80 MILLIGRAM(S): at 10:05

## 2020-03-31 RX ADMIN — LACTULOSE 10 GRAM(S): 10 SOLUTION ORAL at 17:50

## 2020-03-31 RX ADMIN — Medication 500 MILLIGRAM(S): at 04:10

## 2020-03-31 RX ADMIN — MIDODRINE HYDROCHLORIDE 2.5 MILLIGRAM(S): 2.5 TABLET ORAL at 12:35

## 2020-03-31 RX ADMIN — Medication 40 MILLIGRAM(S): at 04:12

## 2020-03-31 RX ADMIN — DIVALPROEX SODIUM 250 MILLIGRAM(S): 500 TABLET, DELAYED RELEASE ORAL at 12:35

## 2020-03-31 RX ADMIN — SENNA PLUS 2 TABLET(S): 8.6 TABLET ORAL at 22:28

## 2020-03-31 RX ADMIN — Medication 2000 UNIT(S): at 17:50

## 2020-03-31 RX ADMIN — MIDODRINE HYDROCHLORIDE 2.5 MILLIGRAM(S): 2.5 TABLET ORAL at 04:11

## 2020-03-31 RX ADMIN — APIXABAN 5 MILLIGRAM(S): 2.5 TABLET, FILM COATED ORAL at 04:09

## 2020-03-31 RX ADMIN — Medication 500 MILLIGRAM(S): at 17:50

## 2020-03-31 RX ADMIN — APIXABAN 5 MILLIGRAM(S): 2.5 TABLET, FILM COATED ORAL at 17:50

## 2020-03-31 RX ADMIN — Medication 100 MILLIGRAM(S): at 22:26

## 2020-03-31 RX ADMIN — PANTOPRAZOLE SODIUM 40 MILLIGRAM(S): 20 TABLET, DELAYED RELEASE ORAL at 12:35

## 2020-03-31 RX ADMIN — Medication 2000 UNIT(S): at 04:10

## 2020-03-31 NOTE — PROGRESS NOTE ADULT - ASSESSMENT
59f with cerebral palsy, seizure, copd  admitted with hypoxia  COVID 19+  3/30 nlr 2.86  3/31 nlr 8  procalcitonin trending down    CXR -- similar to 12/2019

## 2020-03-31 NOTE — DIETITIAN INITIAL EVALUATION ADULT. - OTHER INFO
As per chart pt is a 59 year old female who presents from a Group home with a PMH of MRDD, COPD, Cerebral palsy, seizure. Pt admitted hypoxia, positive COVID-19.     Pt under airborne precautions, pt with MRDD unable to obtain subjective information from pt at this time. Subjective information obtained from comprehensive chart review. Supplement use PTA includes Vitamin C, Vitamin D3, MVI, Potassium chloride. NKFA.     Pt is currently on a dysphagia 1 puree, honey thick liquid diet, with fair appetite and PO intake, noted to be consuming about 50% of meals. Admission weight 130.1lbs. Per previous RD note pt's weight (12/3/19) 132lbs, indicates pt's weight has remained stable over the past few months, No nausea/ vomiting/ diarrhea/ constipation noted at this time, noted with fecal incontinence.     +1 b/l knees edema   No pressure injuries noted at this time

## 2020-03-31 NOTE — PROGRESS NOTE ADULT - SUBJECTIVE AND OBJECTIVE BOX
PAST MEDICAL & SURGICAL HISTORY:  Osteoporosis  CP (cerebral palsy)  Scoliosis  Mentally challenged  Epilepsy  COPD (chronic obstructive pulmonary disease)  PVD (peripheral vascular disease)  Osteoporosis  Chest pain  Scoliosis  Seizure  MR (mental retardation), severe: immobilty sec to severe dissability  No significant past surgical history      MEDICATIONS  (STANDING):  ALBUTerol    90 MICROgram(s) HFA Inhaler 2 Puff(s) Inhalation every 6 hours  apixaban 5 milliGRAM(s) Oral two times a day  ascorbic acid 500 milliGRAM(s) Oral two times a day  calcium carbonate 1250 mG  + Vitamin D (OsCal 500 + D) 1 Tablet(s) Oral daily  cholecalciferol 2000 Unit(s) Oral two times a day  lactulose Syrup 10 Gram(s) Oral three times a day  latanoprost 0.005% Ophthalmic Solution 1 Drop(s) Both EYES at bedtime  midodrine. 2.5 milliGRAM(s) Oral three times a day  montelukast 10 milliGRAM(s) Oral at bedtime  pantoprazole   Suspension 40 milliGRAM(s) Oral daily  senna 2 Tablet(s) Oral at bedtime  sodium chloride 0.9%. 1000 milliLiter(s) (50 mL/Hr) IV Continuous <Continuous>  tiotropium 18 MICROgram(s) Capsule 1 Capsule(s) Inhalation daily  valproate sodium IVPB 250 milliGRAM(s) IV Intermittent three times a day  verapamil 80 milliGRAM(s) Oral two times a day    MEDICATIONS  (PRN):  acetaminophen  Suppository .. 650 milliGRAM(s) Rectal every 4 hours PRN Temp greater or equal to 38C (100.4F)  acetaminophen  Suppository .. 650 milliGRAM(s) Rectal every 4 hours PRN Mild Pain (1 - 3)  LORazepam   Injectable 0.5 milliGRAM(s) IV Push every 3 hours PRN acute seizures      Patient is a 59y old  Female who presents with a chief complaint of covid19+  was sent for low oxygen (31 Mar 2020 19:09)      Vital Signs Last 24 Hrs  T(C): 38 (31 Mar 2020 13:41), Max: 38 (31 Mar 2020 13:41)  T(F): 100.4 (31 Mar 2020 13:41), Max: 100.4 (31 Mar 2020 13:41)  HR: 101 (31 Mar 2020 17:16) (96 - 109)  BP: 125/74 (31 Mar 2020 17:16) (90/62 - 131/80)  BP(mean): --  RR: 20 (31 Mar 2020 13:41) (18 - 20)  SpO2: 94% (31 Mar 2020 13:41) (90% - 95%)          03-30 @ 07:01  -  03-31 @ 07:00  --------------------------------------------------------  IN: 600 mL / OUT: 0 mL / NET: 600 mL    03-31 @ 07:01 - 03-31 @ 20:11  --------------------------------------------------------  IN: 540 mL / OUT: 500 mL / NET: 40 mL        REVIEW OF SYSTEMS:    Constitutional: No fever, weight loss or fatigue  Eyes: No eye pain, visual disturbances, or discharge  ENT:  No difficulty hearing, tinnitus, vertigo; No sinus or throat pain  Neck: No pain or stiffness  Breasts: No pain, masses or nipple discharge  Respiratory: No cough, wheezing, chills or hemoptysis  Cardiovascular: No chest pain, palpitations, shortness of breath, dizziness or leg swelling  Gastrointestinal: No abdominal or epigastric pain. No nausea, vomiting or hematemesis; No diarrhea or constipation. No melena or hematochezia.  Genitourinary: No dysuria, frequency, hematuria or incontinence  Rectal: No pain, hemorrhoids or incontinence  Neurological: No headaches, memory loss, loss of strength, numbness or tremors  Skin: No itching, burning, rashes or lesions   Lymph Nodes: No enlarged glands  Endocrine: No heat or cold intolerance; No hair loss  Musculoskeletal: No joint pain or swelling; No muscle, back or extremity pain  Psychiatric: No depression, anxiety, mood swings or difficulty sleeping  Heme/Lymph: No easy bruising or bleeding gums  Allergy and Immunologic: No hives or eczema    PHYSICAL EXAM:  alert  responds to her name  ate very very well todayRespiratory: occ wheez   Cardiovascular: irreg  Gastrointestinal: BS+, soft, NT/ND  Extremities: + peripheral edema  Neurological: A/O , no focal deficits  Skin: No rashes      decubiti: none                          12.7   11.90 )-----------( 275      ( 31 Mar 2020 11:20 )             39.0     03-31    145  |  111<H>  |  7   ----------------------------<  105<H>  3.2<L>   |  29  |  0.32<L>    Ca    8.8      31 Mar 2020 11:19  Phos  1.1     03-31    TPro  6.2  /  Alb  2.4<L>  /  TBili  0.4  /  DBili  x   /  AST  36  /  ALT  23  /  AlkPhos  48  03-31    PT/INR - ( 31 Mar 2020 13:57 )   PT: 15.3 sec;   INR: 1.35 ratio             CARDIAC MARKERS ( 31 Mar 2020 11:19 )  <.015 ng/mL / x     / 115 U/L / x     / x          03-31 @ 11:19    TSH: --  B12:  --   Folate: --   KEV: --   Rheumatoid: --   Ammonia:  --   CPK:  --  Total PSA:  --  Free PSA: --  Cortisol: --  C-Diff:  --  BNP:  --  SPEP: --  Troponin:  <.015  CKMB: --  Valproic acid level:  --  tegretol level: --  dilantin level:  --  phenobarb level: --  keppra level:  --    .Blood Blood-Peripheral  03-29 @ 10:42   No growth to date.  --  --        Urine Culture:  03-29 @ 10:42    --        No growth to date.        Radiology:

## 2020-03-31 NOTE — DIETITIAN INITIAL EVALUATION ADULT. - ADD RECOMMEND
1) Continue with current Dysphagia 1 puree with honey thick liquids diet, 2) Monitor pt's PO intake, if poor recommend oral nutritional supplement, 3) Encouraged adequate PO intake with focus on protein, 4) Replete potassium and phosphorus levels prn, 5) Monitor pt's PO intake, weight, skin, edema, GI distress

## 2020-03-31 NOTE — PROGRESS NOTE ADULT - SUBJECTIVE AND OBJECTIVE BOX
[facility]    [facility] 1EAS 109 W1    Patient is a 59y old  Female who presents with a chief complaint of covid19+  was sent for low oxygen (31 Mar 2020 11:43)       Allergies    barbiturates (Unknown)  Colace (Unknown)  docusate (Unknown)  phenobarbital (Unknown)    Intolerances        HPI:  patient was tested +covid19 few days ago  was sent by house for low oxygen (29 Mar 2020 01:07)      PAST MEDICAL & SURGICAL HISTORY:  Osteoporosis  CP (cerebral palsy)  Scoliosis  Mentally challenged  Epilepsy  COPD (chronic obstructive pulmonary disease)  PVD (peripheral vascular disease)  Osteoporosis  Chest pain  Scoliosis  Seizure  MR (mental retardation), severe: immobilty sec to severe dissability  No significant past surgical history      FAMILY HISTORY:        MEDICATIONS   acetaminophen  Suppository .. 650 milliGRAM(s) Rectal every 4 hours PRN  acetaminophen  Suppository .. 650 milliGRAM(s) Rectal every 4 hours PRN  ALBUTerol    90 MICROgram(s) HFA Inhaler 2 Puff(s) Inhalation every 6 hours  apixaban 5 milliGRAM(s) Oral two times a day  ascorbic acid 500 milliGRAM(s) Oral two times a day  calcium carbonate 1250 mG  + Vitamin D (OsCal 500 + D) 1 Tablet(s) Oral daily  cholecalciferol 2000 Unit(s) Oral two times a day  lactulose Syrup 10 Gram(s) Oral three times a day  latanoprost 0.005% Ophthalmic Solution 1 Drop(s) Both EYES at bedtime  LORazepam   Injectable 0.5 milliGRAM(s) IV Push every 3 hours PRN  midodrine. 2.5 milliGRAM(s) Oral three times a day  montelukast 10 milliGRAM(s) Oral at bedtime  pantoprazole   Suspension 40 milliGRAM(s) Oral daily  senna 2 Tablet(s) Oral at bedtime  sodium chloride 0.9%. 1000 milliLiter(s) IV Continuous <Continuous>  tiotropium 18 MICROgram(s) Capsule 1 Capsule(s) Inhalation daily  valproate sodium IVPB 250 milliGRAM(s) IV Intermittent three times a day  verapamil 80 milliGRAM(s) Oral two times a day      Vital Signs Last 24 Hrs  T(C): 38 (31 Mar 2020 13:41), Max: 38 (31 Mar 2020 13:41)  T(F): 100.4 (31 Mar 2020 13:41), Max: 100.4 (31 Mar 2020 13:41)  HR: 101 (31 Mar 2020 17:16) (96 - 109)  BP: 125/74 (31 Mar 2020 17:16) (90/62 - 131/80)  BP(mean): --  RR: 20 (31 Mar 2020 13:41) (18 - 20)  SpO2: 94% (31 Mar 2020 13:41) (90% - 95%)      03-30-20 @ 07:01  -  03-31-20 @ 07:00  --------------------------------------------------------  IN: 600 mL / OUT: 0 mL / NET: 600 mL    03-31-20 @ 07:01  -  03-31-20 @ 19:09  --------------------------------------------------------  IN: 540 mL / OUT: 500 mL / NET: 40 mL            LABS:                        12.7   11.90 )-----------( 275      ( 31 Mar 2020 11:20 )             39.0     03-31    145  |  111<H>  |  7   ----------------------------<  105<H>  3.2<L>   |  29  |  0.32<L>    Ca    8.8      31 Mar 2020 11:19  Phos  1.1     03-31    TPro  6.2  /  Alb  2.4<L>  /  TBili  0.4  /  DBili  x   /  AST  36  /  ALT  23  /  AlkPhos  48  03-31    PT/INR - ( 31 Mar 2020 13:57 )   PT: 15.3 sec;   INR: 1.35 ratio               ABG - ( 30 Mar 2020 13:55 )  pH, Arterial: 7.40  pH, Blood: x     /  pCO2: 40    /  pO2: 55    / HCO3: 24    / Base Excess: 0.2   /  SaO2: 88                  WBC:  WBC Count: 11.90 K/uL (03-31 @ 11:20)  WBC Count: 6.90 K/uL (03-30 @ 14:26)  WBC Count: 6.05 K/uL (03-29 @ 16:22)  WBC Count: 4.70 K/uL (03-29 @ 00:27)      MICROBIOLOGY:  RECENT CULTURES:  03-29 .Blood Blood-Peripheral XXXX XXXX   No growth to date.          CARDIAC MARKERS ( 31 Mar 2020 11:19 )  <.015 ng/mL / x     / 115 U/L / x     / x            PT/INR - ( 31 Mar 2020 13:57 )   PT: 15.3 sec;   INR: 1.35 ratio             Sodium:  Sodium, Serum: 145 mmol/L (03-31 @ 11:19)  Sodium, Serum: 145 mmol/L (03-30 @ 14:26)  Sodium, Serum: 146 mmol/L (03-29 @ 16:22)  Sodium, Serum: 143 mmol/L (03-29 @ 00:27)      0.32 mg/dL 03-31 @ 11:19  0.41 mg/dL 03-30 @ 14:26  0.42 mg/dL 03-29 @ 16:22  0.65 mg/dL 03-29 @ 00:27      Hemoglobin:  Hemoglobin: 12.7 g/dL (03-31 @ 11:20)  Hemoglobin: 13.2 g/dL (03-30 @ 14:26)  Hemoglobin: 12.2 g/dL (03-29 @ 16:22)  Hemoglobin: 13.6 g/dL (03-29 @ 00:27)      Platelets: Platelet Count - Automated: 275 K/uL (03-31 @ 11:20)  Platelet Count - Automated: 219 K/uL (03-30 @ 14:26)  Platelet Count - Automated: 159 K/uL (03-29 @ 16:22)  Platelet Count - Automated: 158 K/uL (03-29 @ 00:27)      LIVER FUNCTIONS - ( 31 Mar 2020 11:19 )  Alb: 2.4 g/dL / Pro: 6.2 g/dL / ALK PHOS: 48 U/L / ALT: 23 U/L / AST: 36 U/L / GGT: x                 RADIOLOGY & ADDITIONAL STUDIES:

## 2020-03-31 NOTE — PROGRESS NOTE ADULT - PROBLEM SELECTOR PLAN 1
fevers and nlr trending up.  pt does not appear toxic,  cont supportive care with oxygen nrb  encourage deep breaths.  monitor closely  other labs pending today--f/u   blood cx neg-- no focal bacterial infection identified- cont to monitor off antibx.

## 2020-03-31 NOTE — PROGRESS NOTE ADULT - ASSESSMENT
59F with known PAF currently in atrial fibrillation in the setting of cerebral palsy, Mental retardation, COPD, PAD.  She has a CHADSVasc Score of 3 (Female, PAD, HTN).  Heart rates ok overnight but this am increased to 130's.      Suggest:  1. Start Eliquis 2.5mg twice daily.  Would follow CBC daily while in the hospital and every 2 -3 months after discharge.  2. Continue with Furosemide for chronic diastolic HF  3. Will get echo at a later time.  No acute CHF at this time.  4. Supportive care for COVID treatment  5. Will D/C heparin SQ.  6. Continue with Verapamil for rate control in the setting of COPD but increase to 80mg bid.  7. Will follow as needed in the setting of COVID.  8. Above discussed with PCP. 59F with known PAF currently in atrial fibrillation in the setting of cerebral palsy, Mental retardation, COPD, PAD.  She has a CHADSVasc Score of 3 (Female, PAD, HTN).  Heart rates ok overnight but this am increased to 130's.      Suggest:  1. Continue Eliquis 5mg twice daily.  Would follow CBC daily while in the hospital and every 2 -3 months after discharge.  2. Continue with Furosemide for chronic diastolic HF  3. Will get echo at a later time.  No acute CHF at this time.  4. Supportive care for COVID treatment  5. Continue with Verapamil for rate control in the setting of COPD but increase to 80mg bid.  6. Will follow as needed in the setting of COVID.

## 2020-03-31 NOTE — PROGRESS NOTE ADULT - ASSESSMENT
JAYA BEJARANO [facility] P [phone]  1960 DOA 3/30/2020  DR MODESTA RUBY     REVIEW OF SYMPTOMS      Able to give ROS  NO     PHYSICAL EXAM    HEENT Unremarkable PERRLA atraumatic   RESP Fair air entry EXP prolonged    Harsh breath sound Resp distres mild   CARDIAC S1 S2 No S3     NO JVD    ABDOMEN SOFT BS PRESENT NOT DISTENDED No hepatosplenomegaly PEDAL EDEMA present No calf tenderness  NO rash   GENERAL Not TOXIC looking    VITALS/LABS       3/31/2020 1004 104 120/70   3/31/2020 w 11.9 Hb 12.7  Na 45 K 3.2     PT DATA/BEST PRACTICE  ALLERGY     NOTEWORTHY  POINTS/CHANGES ROS/PE                                  WT  59 (3/30/2020)                    BMI   27 (3/30/2020)        ADVANCED DIRECTIVE       Goals of care discussion                                                                                      HEAD OF BED ELEVATION Yes  DYSPHAGIA EVAL                                  DIET      dys 1 puree   honey (3/29)                                  IV F   ns 100 (3/29)  --> NS 50 (3/31)                                                    DVT PROPHYLAXIS   M apixa 5.2 (3/30)  ( a fib)               PATIENT DATA/ASSESSMENT/RECOMMENDATIONS       FEVER OPACITIES POA   A/R  COVID  POA 3/29            POSSIBLE BACTERIAL PNEUMONIA POA   A/R Follow labs incl blod c   ID on case abio deferred   COVID POA  A/R Follow COVID  pcr   Monitor risk strn and Inflamm markers    CRP D-dimer Procal LDH Lact CK Trop ESR Ferritin lfts qtc      Supportive care           Avoid bpap nebulizers hfnc Plan early intubationif resp status worsens     COPD ex   M montelukast (3/29)   M spiriva (3/29)     HYPOTENSION  M Midodrine 2.5x3 (3/29)   A/R Target MAP 65     CHF  M lasix 20 (3/30)   A/R 3/30/2020 Change IV to 50     SZ  Depakote (3/30)    NONVIOLENT NONSELFDESTR LEVEL ONE RESTR   Implemented 3/30/2020     RESP GAS EXCHANGE   A/R Target PO 90-95%   3/30/2020 50% vm 740/40/55   A/R Gas exchange acceptable  If oxygenation worsens may need intubation        BRIEF ASSESSMENT PLAN      59 F HO CP MR PA fib c DHF ADMITTED 3/29 WITH COVID PNEUMONIA RESP FAILURE   RESP FAILURE IF WORSE ICU  COVID 19 positivs RX AS PER ID   OXYGENATION Requiring nrb  NLR    3/30-3/31  4.8  - 8.3           COPD ON BD   PA fib ON APIXABAN   DCHF MONITORED         TIME SPENT Over 25 minutes aggregate care time spent on encounter; activities included   direct pt care, counseling and/or coordinating care reviewing notes, lab data/ imaging , discussion with multidisciplinary team/ pt /family. Risks, benefits, alternatives  discussed in detail.    JAYA BEJARANO [facility] P [phone]  1960 DOA 3/30/2020  DR MODESTA RUBY

## 2020-03-31 NOTE — PROGRESS NOTE ADULT - SUBJECTIVE AND OBJECTIVE BOX
[facility] Cardiology Progress Note [phone] (Dr. Recinos, Michelle, Bernardo, Jaspreet)    CHIEF COMPLAINT: Patient is a 59y old  Female who presents with a chief complaint of covid19+  was sent for low oxygen (30 Mar 2020 18:03)      Follow Up Today: The patient is non-verbal.  Patient with fast heart rates this am and remains in atrial fibrillation with HR in the 130's.    HPI:  patient was tested +covid19 few days ago  was sent by house for low oxygen (29 Mar 2020 01:07)      PAST MEDICAL & SURGICAL HISTORY:  Osteoporosis  CP (cerebral palsy)  Scoliosis  Mentally challenged  Epilepsy  COPD (chronic obstructive pulmonary disease)  PVD (peripheral vascular disease)  Osteoporosis  Chest pain  Scoliosis  Seizure  MR (mental retardation), severe: immobilty sec to severe dissability  No significant past surgical history      MEDICATIONS  (STANDING):  ALBUTerol    90 MICROgram(s) HFA Inhaler 2 Puff(s) Inhalation every 6 hours  apixaban 5 milliGRAM(s) Oral two times a day  ascorbic acid 500 milliGRAM(s) Oral two times a day  calcium carbonate 1250 mG  + Vitamin D (OsCal 500 + D) 1 Tablet(s) Oral daily  cholecalciferol 2000 Unit(s) Oral two times a day  diVALproex Sprinkle 375 milliGRAM(s) Oral at bedtime  diVALproex Sprinkle 250 milliGRAM(s) Oral daily  lactulose Syrup 10 Gram(s) Oral three times a day  latanoprost 0.005% Ophthalmic Solution 1 Drop(s) Both EYES at bedtime  midodrine. 2.5 milliGRAM(s) Oral three times a day  montelukast 10 milliGRAM(s) Oral at bedtime  pantoprazole   Suspension 40 milliGRAM(s) Oral daily  potassium chloride   Powder 40 milliEquivalent(s) Oral once  senna 2 Tablet(s) Oral at bedtime  sodium chloride 0.9%. 1000 milliLiter(s) (50 mL/Hr) IV Continuous <Continuous>  tiotropium 18 MICROgram(s) Capsule 1 Capsule(s) Inhalation daily  verapamil 40 milliGRAM(s) Oral two times a day    MEDICATIONS  (PRN):  acetaminophen  Suppository .. 650 milliGRAM(s) Rectal every 4 hours PRN Temp greater or equal to 38C (100.4F)  acetaminophen  Suppository .. 650 milliGRAM(s) Rectal every 4 hours PRN Mild Pain (1 - 3)  LORazepam   Injectable 0.5 milliGRAM(s) IV Push every 3 hours PRN acute seizures      Allergies    barbiturates (Unknown)  Colace (Unknown)  docusate (Unknown)  phenobarbital (Unknown)    Intolerances        REVIEW OF SYSTEMS:    All other review of systems is negative unless indicated above    Vital Signs Last 24 Hrs  T(C): 37.8 (31 Mar 2020 05:02), Max: 38.8 (30 Mar 2020 13:42)  T(F): 100 (31 Mar 2020 05:02), Max: 101.9 (30 Mar 2020 13:42)  HR: 96 (31 Mar 2020 05:02) (96 - 116)  BP: 131/80 (31 Mar 2020 03:43) (90/62 - 131/80)  BP(mean): --  RR: 18 (31 Mar 2020 05:02) (18 - 22)  SpO2: 93% (31 Mar 2020 05:02) (90% - 93%)    I&O's Summary    30 Mar 2020 07:01  -  31 Mar 2020 07:00  --------------------------------------------------------  IN: 600 mL / OUT: 0 mL / NET: 600 mL        PHYSICAL EXAM:    Constitutional: NAD, awake and alert, well-developed  Eyes:  EOMI,  Pupils round, No oral cyanosis.  HEENT: No exudate or erythema  Pulmonary: Non-labored, breath sounds are clear bilaterally, No wheezing, rales or rhonchi  Cardiovascular: Regular, S1 and S2, No murmurs, rubs, gallops oir clicks  Gastrointestinal: Bowel Sounds present, soft, nontender.   Ext: No significant LE edema with good pulses x 4  Neurological: Alert, no gross focal motor deficits  Skin: No rashes.  Psych:  Mood & affect appropriate    LABS: All Labs Reviewed:                        13.2   6.90  )-----------( 219      ( 30 Mar 2020 14:26 )             40.3                         12.2   6.05  )-----------( 159      ( 29 Mar 2020 16:22 )             38.0                         13.6   4.70  )-----------( 158      ( 29 Mar 2020 00:27 )             40.9     30 Mar 2020 14:26    145    |  111    |  7      ----------------------------<  87     4.0     |  28     |  0.41   29 Mar 2020 16:22    146    |  116    |  14     ----------------------------<  90     3.8     |  26     |  0.42   29 Mar 2020 00:27    143    |  105    |  14     ----------------------------<  90     3.2     |  33     |  0.65     Ca    8.3        30 Mar 2020 14:26  Ca    7.9        29 Mar 2020 16:22  Ca    9.2        29 Mar 2020 00:27    TPro  6.6    /  Alb  2.7    /  TBili  0.3    /  DBili  x      /  AST  35     /  ALT  24     /  AlkPhos  49     30 Mar 2020 14:26  TPro  5.9    /  Alb  2.5    /  TBili  0.2    /  DBili  x      /  AST  33     /  ALT  25     /  AlkPhos  43     29 Mar 2020 16:22  TPro  6.7    /  Alb  3.0    /  TBili  0.2    /  DBili  x      /  AST  32     /  ALT  28     /  AlkPhos  47     29 Mar 2020 00:27          Blood Culture: Organism --  Gram Stain Blood -- Gram Stain --  Specimen Source .Blood Blood-Peripheral  Culture-Blood --      03-30 @ 14:26  Pro Bnp [id number]        RADIOLOGY/EKG:    Attending Attestation:   20 minutes spent on total encounter; more than 50% of the visit was spent counseling and/or coordinating care by the attending physician.     ASSESSMENT AND PLAN [facility] Card[facility] Progress Note [phone] (Dr. Recinos, Michelle, Bernardo, Jaspreet)    CHIEF COMPLAINT: Patient is a 59y old  Female who presents with a chief complaint of covid19+  was sent for low oxygen (30 Mar 2020 18:03)      Follow Up Today: The patient is non-verbal.  Patient with fast heart rates this am and remains in atrial fibrillation with HR in the 120's this am.    HPI:  patient was tested +covid19 few days ago  was sent by house for low oxygen (29 Mar 2020 01:07)      PAST MEDICAL & SURGICAL HISTORY:  Osteoporosis  CP (cerebral palsy)  Scoliosis  Mentally challenged  Epilepsy  COPD (chronic obstructive pulmonary disease)  PVD (peripheral vascular disease)  Osteoporosis  Chest pain  Scoliosis  Seizure  MR (mental retardation), severe: immobilty sec to severe dissability  No significant past surgical history      MEDICATIONS  (STANDING):  ALBUTerol    90 MICROgram(s) HFA Inhaler 2 Puff(s) Inhalation every 6 hours  apixaban 5 milliGRAM(s) Oral two times a day  ascorbic acid 500 milliGRAM(s) Oral two times a day  calcium carbonate 1250 mG  + Vitamin D (OsCal 500 + D) 1 Tablet(s) Oral daily  cholecalciferol 2000 Unit(s) Oral two times a day  diVALproex Sprinkle 375 milliGRAM(s) Oral at bedtime  diVALproex Sprinkle 250 milliGRAM(s) Oral daily  lactulose Syrup 10 Gram(s) Oral three times a day  latanoprost 0.005% Ophthalmic Solution 1 Drop(s) Both EYES at bedtime  midodrine. 2.5 milliGRAM(s) Oral three times a day  montelukast 10 milliGRAM(s) Oral at bedtime  pantoprazole   Suspension 40 milliGRAM(s) Oral daily  potassium chloride   Powder 40 milliEquivalent(s) Oral once  senna 2 Tablet(s) Oral at bedtime  sodium chloride 0.9%. 1000 milliLiter(s) (50 mL/Hr) IV Continuous <Continuous>  tiotropium 18 MICROgram(s) Capsule 1 Capsule(s) Inhalation daily  verapamil 40 milliGRAM(s) Oral two times a day    MEDICATIONS  (PRN):  acetaminophen  Suppository .. 650 milliGRAM(s) Rectal every 4 hours PRN Temp greater or equal to 38C (100.4F)  acetaminophen  Suppository .. 650 milliGRAM(s) Rectal every 4 hours PRN Mild Pain (1 - 3)  LORazepam   Injectable 0.5 milliGRAM(s) IV Push every 3 hours PRN acute seizures      Allergies    barbiturates (Unknown)  Colace (Unknown)  docusate (Unknown)  phenobarbital (Unknown)    Intolerances        REVIEW OF SYSTEMS:    All other review of systems is negative unless indicated above    Vital Signs Last 24 Hrs  T(C): 37.8 (31 Mar 2020 05:02), Max: 38.8 (30 Mar 2020 13:42)  T(F): 100 (31 Mar 2020 05:02), Max: 101.9 (30 Mar 2020 13:42)  HR: 96 (31 Mar 2020 05:02) (96 - 116)  BP: 131/80 (31 Mar 2020 03:43) (90/62 - 131/80)  BP(mean): --  RR: 18 (31 Mar 2020 05:02) (18 - 22)  SpO2: 93% (31 Mar 2020 05:02) (90% - 93%)    I&O's Summary    30 Mar 2020 07:01  -  31 Mar 2020 07:00  --------------------------------------------------------  IN: 600 mL / OUT: 0 mL / NET: 600 mL        PHYSICAL EXAM:    Constitutional: NAD, awake and alert, well-developed  Eyes:  EOMI,  Pupils round, No oral cyanosis.  HEENT: No exudate or erythema  Pulmonary: Decreased BS b/li  Cardiovascular: Regular, S1 and S2, No murmurs  Gastrointestinal: Bowel Sounds present, soft, nontender.   Ext: No significant LE edema   Neurological: Alert, moves all extremeties  Skin: No rashes.  Psych:  MRDD    LABS: All Labs Reviewed:                        13.2   6.90  )-----------( 219      ( 30 Mar 2020 14:26 )             40.3                         12.2   6.05  )-----------( 159      ( 29 Mar 2020 16:22 )             38.0                         13.6   4.70  )-----------( 158      ( 29 Mar 2020 00:27 )             40.9     30 Mar 2020 14:26    145    |  111    |  7      ----------------------------<  87     4.0     |  28     |  0.41   29 Mar 2020 16:22    146    |  116    |  14     ----------------------------<  90     3.8     |  26     |  0.42   29 Mar 2020 00:27    143    |  105    |  14     ----------------------------<  90     3.2     |  33     |  0.65     Ca    8.3        30 Mar 2020 14:26  Ca    7.9        29 Mar 2020 16:22  Ca    9.2        29 Mar 2020 00:27    TPro  6.6    /  Alb  2.7    /  TBili  0.3    /  DBili  x      /  AST  35     /  ALT  24     /  AlkPhos  49     30 Mar 2020 14:26  TPro  5.9    /  Alb  2.5    /  TBili  0.2    /  DBili  x      /  AST  33     /  ALT  25     /  AlkPhos  43     29 Mar 2020 16:22  TPro  6.7    /  Alb  3.0    /  TBili  0.2    /  DBili  x      /  AST  32     /  ALT  28     /  AlkPhos  47     29 Mar 2020 00:27          Blood Culture: Organism --  Gram Stain Blood -- Gram Stain --  Specimen Source .Blood Blood-Peripheral  Culture-Blood --      03-30 @ 14:26  Pro Bnp [id number]        RADIOLOGY/EKG:    Attending Attestation:   20 minutes spent on total encounter; more than 50% of the visit was spent counseling and/or coordinating care by the attending physician.     ASSESSMENT AND PLAN

## 2020-03-31 NOTE — PROGRESS NOTE ADULT - SUBJECTIVE AND OBJECTIVE BOX
[facility], Division of Infectious Diseases  CHERRI Salas A. Lee  [phone]    Name: CARLEE LAKE  Age: 59y  Gender: Female  MRN: [id number]    Interval History--  Notes reviewed  awake, screaming, cognitive dysfunction    Past Medical History--  Osteoporosis  CP (cerebral palsy)  Scoliosis  Mentally challenged  Epilepsy  COPD (chronic obstructive pulmonary disease)  PVD (peripheral vascular disease)  Osteoporosis  Chest pain  Scoliosis  Seizure  MR (mental retardation), severe  No significant past surgical history      For details regarding the patient's social history, family history, and other miscellaneous elements, please refer the initial infectious diseases consultation and/or the admitting history and physical examination for this admission.    Allergies    barbiturates (Unknown)  Colace (Unknown)  docusate (Unknown)  phenobarbital (Unknown)    Intolerances        Medications--  Antibiotics:    Immunologic:    Other:  acetaminophen  Suppository .. PRN  acetaminophen  Suppository .. PRN  ALBUTerol    90 MICROgram(s) HFA Inhaler  apixaban  ascorbic acid  calcium carbonate 1250 mG  + Vitamin D (OsCal 500 + D)  cholecalciferol  diVALproex Sprinkle  diVALproex Sprinkle  lactulose Syrup  latanoprost 0.005% Ophthalmic Solution  LORazepam   Injectable PRN  midodrine.  montelukast  pantoprazole   Suspension  potassium chloride   Powder  senna  sodium chloride 0.9%.  tiotropium 18 MICROgram(s) Capsule  verapamil      Review of Systems--  A 10-point review of systems was obtained.   unable to obtain  Review of systems otherwise negative except as previously noted.    Physical Examination--  Vital Signs: T(F): 100 (03-31-20 @ 05:02), Max: 101.9 (03-30-20 @ 13:42)  HR: 109 (03-31-20 @ 10:03)  BP: 124/69 (03-31-20 @ 10:03)  RR: 19 (03-31-20 @ 10:03)  SpO2: 94% (03-31-20 @ 10:03)  Wt(kg): --  General: Nontoxic-appearing Female in no acute distress.  HEENT: AT/NC Anicteric. +FM NRB  Nodes: None palpable.  Lungs: Clear bilaterally without rales, wheezing or rhonchi  Heart:tachy s1s2  Abdomen: soft  Extremities: No cyanosis or clubbing. trace edema.   Skin: Warm. Dry. Good turgor. No rash. No vasculitic stigmata.  Psychiatric: confused        Laboratory Studies--  CBC                        12.7   11.90 )-----------( 275      ( 31 Mar 2020 11:20 )             39.0       Chemistries  03-30    145  |  111<H>  |  7   ----------------------------<  87  4.0   |  28  |  0.41<L>    Ca    8.3<L>      30 Mar 2020 14:26    TPro  6.6  /  Alb  2.7<L>  /  TBili  0.3  /  DBili  x   /  AST  35  /  ALT  24  /  AlkPhos  49  03-30      Culture Data    Culture - Blood (collected 29 Mar 2020 10:42)  Source: .Blood Blood-Peripheral  Preliminary Report (30 Mar 2020 11:02):    No growth to date.    Culture - Blood (collected 29 Mar 2020 10:42)  Source: .Blood Blood-Peripheral  Preliminary Report (30 Mar 2020 11:02):    No growth to date.

## 2020-04-01 LAB
ALBUMIN SERPL ELPH-MCNC: 2.5 G/DL — LOW (ref 3.3–5)
ALP SERPL-CCNC: 58 U/L — SIGNIFICANT CHANGE UP (ref 40–120)
ALT FLD-CCNC: 24 U/L — SIGNIFICANT CHANGE UP (ref 12–78)
ANION GAP SERPL CALC-SCNC: 7 MMOL/L — SIGNIFICANT CHANGE UP (ref 5–17)
AST SERPL-CCNC: 33 U/L — SIGNIFICANT CHANGE UP (ref 15–37)
BASE EXCESS BLDA CALC-SCNC: 1.7 MMOL/L — SIGNIFICANT CHANGE UP (ref -2–2)
BASOPHILS # BLD AUTO: 0.04 K/UL — SIGNIFICANT CHANGE UP (ref 0–0.2)
BASOPHILS NFR BLD AUTO: 0.3 % — SIGNIFICANT CHANGE UP (ref 0–2)
BILIRUB SERPL-MCNC: 0.4 MG/DL — SIGNIFICANT CHANGE UP (ref 0.2–1.2)
BLOOD GAS COMMENTS ARTERIAL: SIGNIFICANT CHANGE UP
BUN SERPL-MCNC: 8 MG/DL — SIGNIFICANT CHANGE UP (ref 7–23)
CALCIUM SERPL-MCNC: 8.7 MG/DL — SIGNIFICANT CHANGE UP (ref 8.5–10.1)
CHLORIDE SERPL-SCNC: 109 MMOL/L — HIGH (ref 96–108)
CK SERPL-CCNC: 65 U/L — SIGNIFICANT CHANGE UP (ref 26–192)
CO2 SERPL-SCNC: 29 MMOL/L — SIGNIFICANT CHANGE UP (ref 22–31)
CREAT SERPL-MCNC: 0.39 MG/DL — LOW (ref 0.5–1.3)
EOSINOPHIL # BLD AUTO: 0 K/UL — SIGNIFICANT CHANGE UP (ref 0–0.5)
EOSINOPHIL NFR BLD AUTO: 0 % — SIGNIFICANT CHANGE UP (ref 0–6)
FERRITIN SERPL-MCNC: 642 NG/ML — HIGH (ref 15–150)
GLUCOSE SERPL-MCNC: 84 MG/DL — SIGNIFICANT CHANGE UP (ref 70–99)
HCO3 BLDA-SCNC: 25 MMOL/L — SIGNIFICANT CHANGE UP (ref 23–27)
HCT VFR BLD CALC: 40.9 % — SIGNIFICANT CHANGE UP (ref 34.5–45)
HGB BLD-MCNC: 13.2 G/DL — SIGNIFICANT CHANGE UP (ref 11.5–15.5)
HOROWITZ INDEX BLDA+IHG-RTO: 100 — SIGNIFICANT CHANGE UP
IMM GRANULOCYTES NFR BLD AUTO: 1.4 % — SIGNIFICANT CHANGE UP (ref 0–1.5)
INR BLD: 1.15 RATIO — SIGNIFICANT CHANGE UP (ref 0.88–1.16)
LDH SERPL L TO P-CCNC: 478 U/L — HIGH (ref 50–242)
LYMPHOCYTES # BLD AUTO: 1.38 K/UL — SIGNIFICANT CHANGE UP (ref 1–3.3)
LYMPHOCYTES # BLD AUTO: 11 % — LOW (ref 13–44)
MAGNESIUM SERPL-MCNC: 1.6 MG/DL — SIGNIFICANT CHANGE UP (ref 1.6–2.6)
MCHC RBC-ENTMCNC: 32.3 GM/DL — SIGNIFICANT CHANGE UP (ref 32–36)
MCHC RBC-ENTMCNC: 32.3 PG — SIGNIFICANT CHANGE UP (ref 27–34)
MCV RBC AUTO: 100 FL — SIGNIFICANT CHANGE UP (ref 80–100)
MONOCYTES # BLD AUTO: 1.21 K/UL — HIGH (ref 0–0.9)
MONOCYTES NFR BLD AUTO: 9.6 % — SIGNIFICANT CHANGE UP (ref 2–14)
NEUTROPHILS # BLD AUTO: 9.79 K/UL — HIGH (ref 1.8–7.4)
NEUTROPHILS NFR BLD AUTO: 77.7 % — HIGH (ref 43–77)
NRBC # BLD: 0 /100 WBCS — SIGNIFICANT CHANGE UP (ref 0–0)
PCO2 BLDA: 52 MMHG — HIGH (ref 32–46)
PH BLDA: 7.34 — LOW (ref 7.35–7.45)
PLATELET # BLD AUTO: 320 K/UL — SIGNIFICANT CHANGE UP (ref 150–400)
PO2 BLDA: 63 MMHG — LOW (ref 74–108)
POTASSIUM SERPL-MCNC: 3.9 MMOL/L — SIGNIFICANT CHANGE UP (ref 3.5–5.3)
POTASSIUM SERPL-SCNC: 3.9 MMOL/L — SIGNIFICANT CHANGE UP (ref 3.5–5.3)
PROT SERPL-MCNC: 6.6 G/DL — SIGNIFICANT CHANGE UP (ref 6–8.3)
PROTHROM AB SERPL-ACNC: 12.9 SEC — SIGNIFICANT CHANGE UP (ref 10–12.9)
RBC # BLD: 4.09 M/UL — SIGNIFICANT CHANGE UP (ref 3.8–5.2)
RBC # FLD: 14.6 % — HIGH (ref 10.3–14.5)
SAO2 % BLDA: 91 % — LOW (ref 92–96)
SODIUM SERPL-SCNC: 145 MMOL/L — SIGNIFICANT CHANGE UP (ref 135–145)
WBC # BLD: 12.6 K/UL — HIGH (ref 3.8–10.5)
WBC # FLD AUTO: 12.6 K/UL — HIGH (ref 3.8–10.5)

## 2020-04-01 PROCEDURE — 71045 X-RAY EXAM CHEST 1 VIEW: CPT | Mod: 26

## 2020-04-01 RX ORDER — FUROSEMIDE 40 MG
20 TABLET ORAL DAILY
Refills: 0 | Status: COMPLETED | OUTPATIENT
Start: 2020-04-01 | End: 2020-04-02

## 2020-04-01 RX ADMIN — ALBUTEROL 2 PUFF(S): 90 AEROSOL, METERED ORAL at 22:20

## 2020-04-01 RX ADMIN — Medication 80 MILLIGRAM(S): at 18:27

## 2020-04-01 RX ADMIN — LACTULOSE 10 GRAM(S): 10 SOLUTION ORAL at 14:59

## 2020-04-01 RX ADMIN — MIDODRINE HYDROCHLORIDE 2.5 MILLIGRAM(S): 2.5 TABLET ORAL at 15:00

## 2020-04-01 RX ADMIN — MIDODRINE HYDROCHLORIDE 2.5 MILLIGRAM(S): 2.5 TABLET ORAL at 18:27

## 2020-04-01 RX ADMIN — Medication 2000 UNIT(S): at 06:55

## 2020-04-01 RX ADMIN — LACTULOSE 10 GRAM(S): 10 SOLUTION ORAL at 06:53

## 2020-04-01 RX ADMIN — Medication 500 MILLIGRAM(S): at 06:53

## 2020-04-01 RX ADMIN — Medication 2000 UNIT(S): at 18:27

## 2020-04-01 RX ADMIN — Medication 80 MILLIGRAM(S): at 06:53

## 2020-04-01 RX ADMIN — Medication 500 MILLIGRAM(S): at 18:27

## 2020-04-01 RX ADMIN — APIXABAN 5 MILLIGRAM(S): 2.5 TABLET, FILM COATED ORAL at 18:27

## 2020-04-01 RX ADMIN — SENNA PLUS 2 TABLET(S): 8.6 TABLET ORAL at 22:35

## 2020-04-01 RX ADMIN — APIXABAN 5 MILLIGRAM(S): 2.5 TABLET, FILM COATED ORAL at 06:53

## 2020-04-01 RX ADMIN — MIDODRINE HYDROCHLORIDE 2.5 MILLIGRAM(S): 2.5 TABLET ORAL at 06:53

## 2020-04-01 RX ADMIN — Medication 100 MILLIGRAM(S): at 22:35

## 2020-04-01 RX ADMIN — MONTELUKAST 10 MILLIGRAM(S): 4 TABLET, CHEWABLE ORAL at 22:20

## 2020-04-01 RX ADMIN — Medication 20 MILLIGRAM(S): at 18:27

## 2020-04-01 RX ADMIN — Medication 1 TABLET(S): at 15:00

## 2020-04-01 RX ADMIN — Medication 100 MILLIGRAM(S): at 15:00

## 2020-04-01 RX ADMIN — TIOTROPIUM BROMIDE 1 CAPSULE(S): 18 CAPSULE ORAL; RESPIRATORY (INHALATION) at 09:56

## 2020-04-01 RX ADMIN — LACTULOSE 10 GRAM(S): 10 SOLUTION ORAL at 22:20

## 2020-04-01 RX ADMIN — PANTOPRAZOLE SODIUM 40 MILLIGRAM(S): 20 TABLET, DELAYED RELEASE ORAL at 14:45

## 2020-04-01 RX ADMIN — Medication 100 MILLIGRAM(S): at 06:53

## 2020-04-01 NOTE — PROGRESS NOTE ADULT - ASSESSMENT
BRIEF ASSESSMENT PLAN      59 F HO CP MR PA fib c [facility] ADMITTED 3/29 WITH COVID PNEUMONIA RESP FAILURE   RESP FAILURE IF WORSE ICU    COPD ON BD   PA fib ON APIXABAN   DCHF MONITORED   lasix 20 ()         COPD ex   M montelukast (3/29)   M spiriva (3/29)     HYPOTENSION  M Midodrine 2.5x3 (3/29)   A/R Target MAP 65     CHF  M lasix 20 (3/30)   A/R 3/30/2020 Change IV to 50     SZ  Depakote (3/30)    NONVIOLENT NONSELFDESTR LEVEL ONE RESTR   Implemented 3/30/2020     RESP GAS EXCHANGE   A/R Target PO 90-95%   3/30/2020 50% vm 740/40/55   A/R Gas exchange acceptable  If oxygenation worsens may need intubation          COVID  Supp care   SYMPTOM ONSET   Poss 3/27  OXYGENATION  Requiring nrb  PROGNOSIS   3/30-3/31-  4.8  - 8.3- 7.3                          MEDS     PLAN   Supp care   Early intubation if desat   Avoid nebs bpap       TIME SPENT Over 25 minutes aggregate care time spent on encounter; activities included   direct pt care, counseling and/or coordinating care reviewing notes, lab data/ imaging , discussion with multidisciplinary team/ pt /family. Risks, benefits, alternatives  discussed in detail.    JAYA BEJARANO [facility] P [phone]  1960 DOA 3/30/2020  DR MODESTA RUBY

## 2020-04-01 NOTE — PROGRESS NOTE ADULT - SUBJECTIVE AND OBJECTIVE BOX
PAST MEDICAL & SURGICAL HISTORY:  Osteoporosis  CP (cerebral palsy)  Scoliosis  Mentally challenged  Epilepsy  COPD (chronic obstructive pulmonary disease)  PVD (peripheral vascular disease)  Osteoporosis  Chest pain  Scoliosis  Seizure  MR (mental retardation), severe: immobilty sec to severe dissability  No significant past surgical history      MEDICATIONS  (STANDING):  ALBUTerol    90 MICROgram(s) HFA Inhaler 2 Puff(s) Inhalation every 6 hours  apixaban 5 milliGRAM(s) Oral two times a day  ascorbic acid 500 milliGRAM(s) Oral two times a day  calcium carbonate 1250 mG  + Vitamin D (OsCal 500 + D) 1 Tablet(s) Oral daily  cholecalciferol 2000 Unit(s) Oral two times a day  furosemide   Injectable 20 milliGRAM(s) IV Push daily  lactulose Syrup 10 Gram(s) Oral three times a day  latanoprost 0.005% Ophthalmic Solution 1 Drop(s) Both EYES at bedtime  midodrine. 2.5 milliGRAM(s) Oral three times a day  montelukast 10 milliGRAM(s) Oral at bedtime  pantoprazole   Suspension 40 milliGRAM(s) Oral daily  senna 2 Tablet(s) Oral at bedtime  tiotropium 18 MICROgram(s) Capsule 1 Capsule(s) Inhalation daily  valproate sodium IVPB 250 milliGRAM(s) IV Intermittent three times a day  verapamil 80 milliGRAM(s) Oral two times a day    MEDICATIONS  (PRN):  acetaminophen  Suppository .. 650 milliGRAM(s) Rectal every 4 hours PRN Temp greater or equal to 38C (100.4F)  acetaminophen  Suppository .. 650 milliGRAM(s) Rectal every 4 hours PRN Mild Pain (1 - 3)  LORazepam   Injectable 0.5 milliGRAM(s) IV Push every 3 hours PRN acute seizures      Patient is a 59y old  Female who presents with a chief complaint of covid19+  was sent for low oxygen (01 Apr 2020 16:01)      Vital Signs Last 24 Hrs  T(C): 37.1 (01 Apr 2020 17:06), Max: 37.3 (01 Apr 2020 00:05)  T(F): 98.7 (01 Apr 2020 17:06), Max: 99.1 (01 Apr 2020 00:05)  HR: 97 (01 Apr 2020 17:06) (90 - 106)  BP: 152/85 (01 Apr 2020 17:06) (100/70 - 152/85)  BP(mean): --  RR: 20 (01 Apr 2020 17:06) (20 - 20)  SpO2: 93% (01 Apr 2020 17:06) (93% - 97%)          03-31 @ 07:01 - 04-01 @ 07:00  --------------------------------------------------------  IN: 590 mL / OUT: 500 mL / NET: 90 mL    04-01 @ 07:01 - 04-01 @ 19:55  --------------------------------------------------------  IN: 600 mL / OUT: 0 mL / NET: 600 mL        REVIEW OF SYSTEMS:    Constitutional: No fever, weight loss or fatigue  Eyes: No eye pain, visual disturbances, or discharge  ENT:  No difficulty hearing, tinnitus, vertigo; No sinus or throat pain  Neck: No pain or stiffness  Breasts: No pain, masses or nipple discharge  Respiratory: No cough, wheezing, chills or hemoptysis  Cardiovascular: No chest pain, palpitations, shortness of breath, dizziness or leg swelling  Gastrointestinal: No abdominal or epigastric pain. No nausea, vomiting or hematemesis; No diarrhea or constipation. No melena or hematochezia.  Genitourinary: No dysuria, frequency, hematuria or incontinence  Rectal: No pain, hemorrhoids or incontinence  Neurological: No headaches, memory loss, loss of strength, numbness or tremors  Skin: No itching, burning, rashes or lesions   Lymph Nodes: No enlarged glands  Endocrine: No heat or cold intolerance; No hair loss  Musculoskeletal: No joint pain or swelling; No muscle, back or extremity pain  Psychiatric: No depression, anxiety, mood swings or difficulty sleeping  Heme/Lymph: No easy bruising or bleeding gums  Allergy and Immunologic: No hives or eczema    PHYSICAL EXAM:  eating much improved  none toxic  Constitutional: NAD, Respiratory: occ wheez   Cardiovascular: S1 and S2, RRR, no M/G/R  Gastrointestinal: BS+, soft, NT/ND  Extremities: No peripheral edema  Neurological: A/O no focal deficits  Skin: No rashes      decubiti: none                          13.2   12.60 )-----------( 320      ( 01 Apr 2020 09:01 )             40.9     04-01    145  |  109<H>  |  8   ----------------------------<  84  3.9   |  29  |  0.39<L>    Ca    8.7      01 Apr 2020 09:01  Phos  1.1     03-31  Mg     1.6     04-01    TPro  6.6  /  Alb  2.5<L>  /  TBili  0.4  /  DBili  x   /  AST  33  /  ALT  24  /  AlkPhos  58  04-01    PT/INR - ( 01 Apr 2020 09:01 )   PT: 12.9 sec;   INR: 1.15 ratio             CARDIAC MARKERS ( 01 Apr 2020 09:01 )  x     / x     / 65 U/L / x     / x      CARDIAC MARKERS ( 31 Mar 2020 11:19 )  <.015 ng/mL / x     / 115 U/L / x     / x            .Blood Blood-Peripheral  03-29 @ 10:42   No growth to date.  --  --        Urine Culture:  03-29 @ 10:42    --        No growth to date.        Radiology:

## 2020-04-01 NOTE — PROGRESS NOTE ADULT - SUBJECTIVE AND OBJECTIVE BOX
[facility] Cardiology    CHIEF COMPLAINT: Patient is a 59y old  Female who presents with a chief complaint of covid19+  was sent for low oxygen (31 Mar 2020 20:10)      Follow Up: [ ] Chest Pain      [ ] Dyspnea     [ ] Palpitations    [ ] Atrial Fibrillation     [ ] Ventricular Dysrhythmia    [ ] Abnormal EKG                      [ ] Abnormal Cardiac Enzymes     [ ] Valvular Disease    HPI:  patient was tested +covid19 few days ago  was sent by house for low oxygen (29 Mar 2020 01:07)      PAST MEDICAL & SURGICAL HISTORY:  Osteoporosis  CP (cerebral palsy)  Scoliosis  Mentally challenged  Epilepsy  COPD (chronic obstructive pulmonary disease)  PVD (peripheral vascular disease)  Osteoporosis  Chest pain  Scoliosis  Seizure  MR (mental retardation), severe: immobilty sec to severe dissability  No significant past surgical history      MEDICATIONS  (STANDING):  ALBUTerol    90 MICROgram(s) HFA Inhaler 2 Puff(s) Inhalation every 6 hours  apixaban 5 milliGRAM(s) Oral two times a day  ascorbic acid 500 milliGRAM(s) Oral two times a day  calcium carbonate 1250 mG  + Vitamin D (OsCal 500 + D) 1 Tablet(s) Oral daily  cholecalciferol 2000 Unit(s) Oral two times a day  furosemide   Injectable 20 milliGRAM(s) IV Push daily  lactulose Syrup 10 Gram(s) Oral three times a day  latanoprost 0.005% Ophthalmic Solution 1 Drop(s) Both EYES at bedtime  midodrine. 2.5 milliGRAM(s) Oral three times a day  montelukast 10 milliGRAM(s) Oral at bedtime  pantoprazole   Suspension 40 milliGRAM(s) Oral daily  senna 2 Tablet(s) Oral at bedtime  tiotropium 18 MICROgram(s) Capsule 1 Capsule(s) Inhalation daily  valproate sodium IVPB 250 milliGRAM(s) IV Intermittent three times a day  verapamil 80 milliGRAM(s) Oral two times a day    MEDICATIONS  (PRN):  acetaminophen  Suppository .. 650 milliGRAM(s) Rectal every 4 hours PRN Temp greater or equal to 38C (100.4F)  acetaminophen  Suppository .. 650 milliGRAM(s) Rectal every 4 hours PRN Mild Pain (1 - 3)  LORazepam   Injectable 0.5 milliGRAM(s) IV Push every 3 hours PRN acute seizures      Allergies    barbiturates (Unknown)  Colace (Unknown)  docusate (Unknown)  phenobarbital (Unknown)    Intolerances        REVIEW OF SYSTEMS:    CONSTITUTIONAL: No weakness, fevers or chills.   EYES/ENT: No visual changes;  No vertigo or throat pain   NECK: No pain or stiffness  RESPIRATORY: No cough, wheezing, hemoptysis; No shortness of breath  CARDIOVASCULAR: No chest pain or palpitations  GASTROINTESTINAL: No abdominal or epigastric pain. No nausea, vomiting, or hematemesis; No diarrhea or constipation. No melena or hematochezia.  GENITOURINARY: No dysuria, frequency or hematuria  NEUROLOGICAL: No numbness or weakness  SKIN: No itching, burning, rashes, or lesions   All other review of systems is negative unless indicated above    Vital Signs Last 24 Hrs  T(C): 37.1 (01 Apr 2020 11:00), Max: 37.3 (01 Apr 2020 00:05)  T(F): 98.7 (01 Apr 2020 11:00), Max: 99.1 (01 Apr 2020 00:05)  HR: 106 (01 Apr 2020 11:00) (90 - 106)  BP: 113/71 (01 Apr 2020 11:00) (100/70 - 125/74)  BP(mean): --  RR: 20 (01 Apr 2020 11:00) (20 - 20)  SpO2: 94% (01 Apr 2020 11:00) (93% - 97%)    I&O's Summary    31 Mar 2020 07:01  -  01 Apr 2020 07:00  --------------------------------------------------------  IN: 590 mL / OUT: 500 mL / NET: 90 mL    01 Apr 2020 07:01  -  01 Apr 2020 16:01  --------------------------------------------------------  IN: 600 mL / OUT: 0 mL / NET: 600 mL        PHYSICAL EXAM:    Constitutional: NAD,   Eyes:  EOMI,  Pupils round, No oral cyanosis.  HEENT: No exudate or erythema  Pulmonary: Decreased BS b/l  Cardiovascular: Regular, S1 and S2, No murmurs  Gastrointestinal: Bowel Sounds present, soft, nontender.   Ext: No significant LE edema   Neurological: Alert,   Skin: No rashes.    LABS: All Labs Reviewed:                        13.2   12.60 )-----------( 320      ( 01 Apr 2020 09:01 )             40.9     04-01    145  |  109<H>  |  8   ----------------------------<  84  3.9   |  29  |  0.39<L>    Ca    8.7      01 Apr 2020 09:01  Phos  1.1     03-31  Mg     1.6     04-01    TPro  6.6  /  Alb  2.5<L>  /  TBili  0.4  /  DBili  x   /  AST  33  /  ALT  24  /  AlkPhos  58  04-01    PT/INR - ( 01 Apr 2020 09:01 )   PT: 12.9 sec;   INR: 1.15 ratio           CARDIAC MARKERS ( 01 Apr 2020 09:01 )  x     / x     / 65 U/L / x     / x      CARDIAC MARKERS ( 31 Mar 2020 11:19 )  <.015 ng/mL / x     / 115 U/L / x     / x          Blood Culture: Organism --  Gram Stain Blood -- Gram Stain --  Specimen Source .Blood Blood-Peripheral  Culture-Blood --      03-30 @ 14:26  Pro Bnp 3539    · Assessment		  59F with known PAF currently in atrial fibrillation in the setting of cerebral palsy, Mental retardation, COPD, PAD.  She has a CHADSVasc Score of 3 (Female, PAD, HTN).  Heart rates ok, low 100s.      Suggest:  1. Continue Eliquis 5mg twice daily.   2. Continue with Furosemide for chronic diastolic HF  3. Will get echo at a later time.  No acute CHF at this time.  4. Supportive care for COVID treatment  5. Continue with Verapamil 80 BID,  in the setting of COPD   6. Will follow as needed in the setting of COVID.

## 2020-04-01 NOTE — PROGRESS NOTE ADULT - SUBJECTIVE AND OBJECTIVE BOX
JAYA BEJARANO [facility] P [phone]  1960 DOA 3/30/2020  DR MODESTA RUBY     REVIEW OF SYMPTOMS      Able to give ROS  NO     PHYSICAL EXAM    HEENT Unremarkable PERRLA atraumatic   RESP Fair air entry EXP prolonged    Harsh breath sound Resp distres mild   CARDIAC S1 S2 No S3     NO JVD    ABDOMEN SOFT BS PRESENT NOT DISTENDED No hepatosplenomegaly PEDAL EDEMA present No calf tenderness  NO rash   GENERAL Not TOXIC looking    VITALS/LABS       2020 afeb 106 113/71   3/31/2020 1004 104 120/70   3/31/2020 w 11.9 Hb 12.7  Na 45 K 3.2     PT DATA/BEST PRACTICE  ALLERGY     NOTEWORTHY  POINTS/CHANGES ROS/PE                                  WT  59 (3/30/2020)                    BMI   27 (3/30/2020)        ADVANCED DIRECTIVE       Goals of care discussion                                                                                      HEAD OF BED ELEVATION Yes  DYSPHAGIA EVAL                                  DIET      dys 1 puree   honey (3/29)                                  IV F   ns 100 (3/29)  --> NS 50 (3/31)                                                    DVT PROPHYLAXIS   M apixa 5.2 (3/30)  ( a fib)                  STRESS ULCER PROPHYLAXIS                                                                  INFECTION PPLX    ECHO  BNP 3/31/2020 bnp 3539                  CXR      2020 cxr porfression of bl airspace dz     3/30/2020 cxr incr bl perihilar markings                   CT                                                                              MICROBIO         3/30/2020 Blod c n   3/31/2020 pc n           ABIO                PROCEDURE                               COVID-19 covid posit 3/28  SYMPTOM ONSET  PRE DC AFEBRILE DATES                          RESP   O 2020 nrb 935   A 1 734/52/63   A   3/30/2020 50% vm 740/40/55   W    3/ w 6.9 -12.6                       H 3/30/2020 Hb 13.2   N    3/30/2020 Neutr 4.8           l  3/30/2020 lymp 1.05  NLR    3/30-3/31-4  4.8  - 8.3- 7.3                             Na   3/31/2020 Na 145   Cr    3/31/2020 Cr .3

## 2020-04-02 LAB
ALBUMIN SERPL ELPH-MCNC: 2.5 G/DL — LOW (ref 3.3–5)
ALP SERPL-CCNC: 67 U/L — SIGNIFICANT CHANGE UP (ref 40–120)
ALT FLD-CCNC: 24 U/L — SIGNIFICANT CHANGE UP (ref 12–78)
ANION GAP SERPL CALC-SCNC: 6 MMOL/L — SIGNIFICANT CHANGE UP (ref 5–17)
AST SERPL-CCNC: 29 U/L — SIGNIFICANT CHANGE UP (ref 15–37)
BASOPHILS # BLD AUTO: 0.06 K/UL — SIGNIFICANT CHANGE UP (ref 0–0.2)
BASOPHILS NFR BLD AUTO: 0.5 % — SIGNIFICANT CHANGE UP (ref 0–2)
BILIRUB SERPL-MCNC: 0.4 MG/DL — SIGNIFICANT CHANGE UP (ref 0.2–1.2)
BUN SERPL-MCNC: 11 MG/DL — SIGNIFICANT CHANGE UP (ref 7–23)
CALCIUM SERPL-MCNC: 8.7 MG/DL — SIGNIFICANT CHANGE UP (ref 8.5–10.1)
CHLORIDE SERPL-SCNC: 107 MMOL/L — SIGNIFICANT CHANGE UP (ref 96–108)
CK SERPL-CCNC: 45 U/L — SIGNIFICANT CHANGE UP (ref 26–192)
CO2 SERPL-SCNC: 33 MMOL/L — HIGH (ref 22–31)
CREAT SERPL-MCNC: 0.3 MG/DL — LOW (ref 0.5–1.3)
CULTURE RESULTS: SIGNIFICANT CHANGE UP
CULTURE RESULTS: SIGNIFICANT CHANGE UP
EOSINOPHIL # BLD AUTO: 0 K/UL — SIGNIFICANT CHANGE UP (ref 0–0.5)
EOSINOPHIL NFR BLD AUTO: 0 % — SIGNIFICANT CHANGE UP (ref 0–6)
GLUCOSE SERPL-MCNC: 97 MG/DL — SIGNIFICANT CHANGE UP (ref 70–99)
HCT VFR BLD CALC: 41.8 % — SIGNIFICANT CHANGE UP (ref 34.5–45)
HGB BLD-MCNC: 13.5 G/DL — SIGNIFICANT CHANGE UP (ref 11.5–15.5)
IMM GRANULOCYTES NFR BLD AUTO: 1.9 % — HIGH (ref 0–1.5)
INR BLD: 1.33 RATIO — HIGH (ref 0.88–1.16)
LYMPHOCYTES # BLD AUTO: 1.1 K/UL — SIGNIFICANT CHANGE UP (ref 1–3.3)
LYMPHOCYTES # BLD AUTO: 8.6 % — LOW (ref 13–44)
MCHC RBC-ENTMCNC: 32.1 PG — SIGNIFICANT CHANGE UP (ref 27–34)
MCHC RBC-ENTMCNC: 32.3 GM/DL — SIGNIFICANT CHANGE UP (ref 32–36)
MCV RBC AUTO: 99.5 FL — SIGNIFICANT CHANGE UP (ref 80–100)
MONOCYTES # BLD AUTO: 0.92 K/UL — HIGH (ref 0–0.9)
MONOCYTES NFR BLD AUTO: 7.2 % — SIGNIFICANT CHANGE UP (ref 2–14)
NEUTROPHILS # BLD AUTO: 10.41 K/UL — HIGH (ref 1.8–7.4)
NEUTROPHILS NFR BLD AUTO: 81.8 % — HIGH (ref 43–77)
NRBC # BLD: 0 /100 WBCS — SIGNIFICANT CHANGE UP (ref 0–0)
PLATELET # BLD AUTO: 387 K/UL — SIGNIFICANT CHANGE UP (ref 150–400)
POTASSIUM SERPL-MCNC: 3.2 MMOL/L — LOW (ref 3.5–5.3)
POTASSIUM SERPL-SCNC: 3.2 MMOL/L — LOW (ref 3.5–5.3)
PROT SERPL-MCNC: 6.9 G/DL — SIGNIFICANT CHANGE UP (ref 6–8.3)
PROTHROM AB SERPL-ACNC: 15 SEC — HIGH (ref 10–12.9)
RBC # BLD: 4.2 M/UL — SIGNIFICANT CHANGE UP (ref 3.8–5.2)
RBC # FLD: 14.8 % — HIGH (ref 10.3–14.5)
SODIUM SERPL-SCNC: 146 MMOL/L — HIGH (ref 135–145)
SPECIMEN SOURCE: SIGNIFICANT CHANGE UP
SPECIMEN SOURCE: SIGNIFICANT CHANGE UP
TROPONIN I SERPL-MCNC: <.015 NG/ML — SIGNIFICANT CHANGE UP (ref 0.01–0.04)
WBC # BLD: 12.73 K/UL — HIGH (ref 3.8–10.5)
WBC # FLD AUTO: 12.73 K/UL — HIGH (ref 3.8–10.5)

## 2020-04-02 RX ORDER — POTASSIUM CHLORIDE 20 MEQ
40 PACKET (EA) ORAL ONCE
Refills: 0 | Status: COMPLETED | OUTPATIENT
Start: 2020-04-02 | End: 2020-04-02

## 2020-04-02 RX ADMIN — APIXABAN 5 MILLIGRAM(S): 2.5 TABLET, FILM COATED ORAL at 06:07

## 2020-04-02 RX ADMIN — LACTULOSE 10 GRAM(S): 10 SOLUTION ORAL at 06:00

## 2020-04-02 RX ADMIN — PANTOPRAZOLE SODIUM 40 MILLIGRAM(S): 20 TABLET, DELAYED RELEASE ORAL at 13:25

## 2020-04-02 RX ADMIN — LACTULOSE 10 GRAM(S): 10 SOLUTION ORAL at 23:19

## 2020-04-02 RX ADMIN — Medication 20 MILLIGRAM(S): at 05:59

## 2020-04-02 RX ADMIN — Medication 1 TABLET(S): at 13:25

## 2020-04-02 RX ADMIN — ALBUTEROL 2 PUFF(S): 90 AEROSOL, METERED ORAL at 01:09

## 2020-04-02 RX ADMIN — MONTELUKAST 10 MILLIGRAM(S): 4 TABLET, CHEWABLE ORAL at 23:20

## 2020-04-02 RX ADMIN — Medication 60 MILLIGRAM(S): at 17:38

## 2020-04-02 RX ADMIN — SENNA PLUS 2 TABLET(S): 8.6 TABLET ORAL at 23:19

## 2020-04-02 RX ADMIN — Medication 2000 UNIT(S): at 06:07

## 2020-04-02 RX ADMIN — MIDODRINE HYDROCHLORIDE 2.5 MILLIGRAM(S): 2.5 TABLET ORAL at 17:37

## 2020-04-02 RX ADMIN — Medication 500 MILLIGRAM(S): at 17:40

## 2020-04-02 RX ADMIN — LACTULOSE 10 GRAM(S): 10 SOLUTION ORAL at 13:26

## 2020-04-02 RX ADMIN — Medication 100 MILLIGRAM(S): at 16:21

## 2020-04-02 RX ADMIN — Medication 80 MILLIGRAM(S): at 18:51

## 2020-04-02 RX ADMIN — Medication 40 MILLIEQUIVALENT(S): at 17:38

## 2020-04-02 RX ADMIN — Medication 100 MILLIGRAM(S): at 06:07

## 2020-04-02 RX ADMIN — LATANOPROST 1 DROP(S): 0.05 SOLUTION/ DROPS OPHTHALMIC; TOPICAL at 23:20

## 2020-04-02 RX ADMIN — LATANOPROST 1 DROP(S): 0.05 SOLUTION/ DROPS OPHTHALMIC; TOPICAL at 00:16

## 2020-04-02 RX ADMIN — Medication 500 MILLIGRAM(S): at 06:07

## 2020-04-02 RX ADMIN — ALBUTEROL 2 PUFF(S): 90 AEROSOL, METERED ORAL at 23:20

## 2020-04-02 RX ADMIN — Medication 2000 UNIT(S): at 17:38

## 2020-04-02 RX ADMIN — Medication 80 MILLIGRAM(S): at 06:00

## 2020-04-02 RX ADMIN — Medication 100 MILLIGRAM(S): at 23:19

## 2020-04-02 RX ADMIN — APIXABAN 5 MILLIGRAM(S): 2.5 TABLET, FILM COATED ORAL at 17:40

## 2020-04-02 NOTE — PROGRESS NOTE ADULT - SUBJECTIVE AND OBJECTIVE BOX
infectious diseases progress note:    CARLEE LAKE is a 59y y. o. Female patient    COVID Patient    Allergies    barbiturates (Unknown)  Colace (Unknown)  docusate (Unknown)  phenobarbital (Unknown)    Intolerances        ANTIBIOTICS/RELEVANT:  antimicrobials    immunologic:    OTHER:  acetaminophen  Suppository .. 650 milliGRAM(s) Rectal every 4 hours PRN  acetaminophen  Suppository .. 650 milliGRAM(s) Rectal every 4 hours PRN  ALBUTerol    90 MICROgram(s) HFA Inhaler 2 Puff(s) Inhalation every 6 hours  apixaban 5 milliGRAM(s) Oral two times a day  ascorbic acid 500 milliGRAM(s) Oral two times a day  calcium carbonate 1250 mG  + Vitamin D (OsCal 500 + D) 1 Tablet(s) Oral daily  cholecalciferol 2000 Unit(s) Oral two times a day  lactulose Syrup 10 Gram(s) Oral three times a day  latanoprost 0.005% Ophthalmic Solution 1 Drop(s) Both EYES at bedtime  LORazepam   Injectable 0.5 milliGRAM(s) IV Push every 3 hours PRN  methylPREDNISolone sodium succinate Injectable 60 milliGRAM(s) IV Push daily  midodrine. 2.5 milliGRAM(s) Oral three times a day  montelukast 10 milliGRAM(s) Oral at bedtime  pantoprazole   Suspension 40 milliGRAM(s) Oral daily  senna 2 Tablet(s) Oral at bedtime  tiotropium 18 MICROgram(s) Capsule 1 Capsule(s) Inhalation daily  valproate sodium IVPB 250 milliGRAM(s) IV Intermittent three times a day  verapamil 80 milliGRAM(s) Oral two times a day      Objective:  Vital Signs Last 24 Hrs  T(C): 36.4 (02 Apr 2020 08:23), Max: 37.1 (01 Apr 2020 17:06)  T(F): 97.6 (02 Apr 2020 08:23), Max: 98.7 (01 Apr 2020 17:06)  HR: 75 (02 Apr 2020 08:23) (75 - 102)  BP: 105/69 (02 Apr 2020 08:23) (105/69 - 152/85)  BP(mean): --  RR: 20 (02 Apr 2020 08:23) (20 - 20)  SpO2: 90% (02 Apr 2020 08:23) (90% - 93%)    T(C): 36.4 (04-02-20 @ 08:23), Max: 38 (03-31-20 @ 13:41)  T(C): 36.4 (04-02-20 @ 08:23), Max: 38.8 (03-30-20 @ 13:42)  T(C): 36.4 (04-02-20 @ 08:23), Max: 38.8 (03-30-20 @ 13:42)    PHYSICAL EXAM:  HEENT: NC atraumatic  Neck: supple  Respiratory: no accessory muscle use, breathing comfortably  Cardiovascular: distant  Gastrointestinal: normal appearing, nondistended  Extremities: no clubbing, no cyanosis,      LABS:                          13.5   12.73 )-----------( 387      ( 02 Apr 2020 07:18 )             41.8       12.73 04-02 @ 07:18  12.60 04-01 @ 09:01  11.90 03-31 @ 11:20  6.90 03-30 @ 14:26  6.05 03-29 @ 16:22  4.70 03-29 @ 00:27      04-02    146<H>  |  107  |  11  ----------------------------<  97  3.2<L>   |  33<H>  |  0.30<L>    Ca    8.7      02 Apr 2020 07:18  Mg     1.6     04-01    TPro  6.9  /  Alb  2.5<L>  /  TBili  0.4  /  DBili  x   /  AST  29  /  ALT  24  /  AlkPhos  67  04-02      Creatinine, Serum: 0.30 mg/dL (04-02-20 @ 07:18)  Creatinine, Serum: 0.39 mg/dL (04-01-20 @ 09:01)  Creatinine, Serum: 0.32 mg/dL (03-31-20 @ 11:19)  Creatinine, Serum: 0.41 mg/dL (03-30-20 @ 14:26)  Creatinine, Serum: 0.42 mg/dL (03-29-20 @ 16:22)  Creatinine, Serum: 0.65 mg/dL (03-29-20 @ 00:27)      PT/INR - ( 02 Apr 2020 07:18 )   PT: 15.0 sec;   INR: 1.33 ratio                   COVID RISK SCORE  Auto Neutrophil #: 10.41 K/uL (04-02-20 @ 07:18)  Auto Lymphocyte #: 1.10 K/uL (04-02-20 @ 07:18)  Auto Neutrophil #: 9.79 K/uL (04-01-20 @ 09:01)  Auto Lymphocyte #: 1.38 K/uL (04-01-20 @ 09:01)  Auto Neutrophil #: 9.29 K/uL (03-31-20 @ 11:20)  Auto Lymphocyte #: 1.11 K/uL (03-31-20 @ 11:20)  Auto Neutrophil #: 4.86 K/uL (03-30-20 @ 14:26)  Auto Lymphocyte #: 1.05 K/uL (03-30-20 @ 14:26)  Auto Neutrophil #: 3.72 K/uL (03-29-20 @ 16:22)  Auto Lymphocyte #: 1.30 K/uL (03-29-20 @ 16:22)  Auto Neutrophil #: 3.57 K/uL (03-29-20 @ 00:27)  Auto Lymphocyte #: 0.61 K/uL (03-29-20 @ 00:27)    Lactate, Blood: 1.3 mmol/L (03-29-20 @ 00:27)    Auto Eosinophil #: 0.00 K/uL (04-02-20 @ 07:18)  Auto Eosinophil #: 0.00 K/uL (04-01-20 @ 09:01)  Auto Eosinophil #: 0.00 K/uL (03-31-20 @ 11:20)  Auto Eosinophil #: 0.00 K/uL (03-30-20 @ 14:26)  Auto Eosinophil #: 0.01 K/uL (03-29-20 @ 16:22)  Auto Eosinophil #: 0.00 K/uL (03-29-20 @ 00:27)    Lactate Dehydrogenase, Serum: 478 U/L (04-01-20 @ 11:23)  Lactate Dehydrogenase, Serum: 496 U/L (03-31-20 @ 16:52)    Sedimentation Rate, Erythrocyte: 29 mm/hr (03-31-20 @ 13:46)    Procalcitonin, Serum: 0.06 ng/mL (03-31-20 @ 11:19)  Procalcitonin, Serum: 0.05 ng/mL (03-30-20 @ 14:26)  Procalcitonin, Serum: 0.11 ng/mL (03-29-20 @ 00:27)    Troponin I, Serum: <.015 ng/mL (04-02-20 @ 07:18)  Troponin I, Serum: <.015 ng/mL (03-31-20 @ 11:19)    Creatine Kinase, Serum: 45 U/L (04-02-20 @ 07:18)  Creatine Kinase, Serum: 65 U/L (04-01-20 @ 09:01)  Creatine Kinase, Serum: 115 U/L (03-31-20 @ 11:19)        Ferritin, Serum: 642 ng/mL (04-01-20 @ 11:37)  Ferritin, Serum: 595 ng/mL (03-31-20 @ 16:51)        INR: 1.33 ratio (04-02-20 @ 07:18)  INR: 1.15 ratio (04-01-20 @ 09:01)  INR: 1.35 ratio (03-31-20 @ 13:57)  Activated Partial Thromboplastin Time: 25.1 sec (03-29-20 @ 00:27)  INR: 1.11 ratio (03-29-20 @ 00:27)    D-Dimer Assay, Quantitative: 253 ng/mL DDU (03-30-20 @ 17:27)        MICROBIOLOGY:              RADIOLOGY & ADDITIONAL STUDIES:

## 2020-04-02 NOTE — PROGRESS NOTE ADULT - SUBJECTIVE AND OBJECTIVE BOX
[facility] Cardiology    CHIEF COMPLAINT: Patient is a 59y old  Female who presents with a chief complaint of covid19+  was sent for low oxygen (02 Apr 2020 12:42)      Follow Up: [ ] Chest Pain      [ ] Dyspnea     [ ] Palpitations    [ ] Atrial Fibrillation     [ ] Ventricular Dysrhythmia    [ ] Abnormal EKG                      [ ] Abnormal Cardiac Enzymes     [ ] Valvular Disease    HPI:  patient was tested +covid19 few days ago  was sent by house for low oxygen (29 Mar 2020 01:07)      PAST MEDICAL & SURGICAL HISTORY:  Osteoporosis  CP (cerebral palsy)  Scoliosis  Mentally challenged  Epilepsy  COPD (chronic obstructive pulmonary disease)  PVD (peripheral vascular disease)  Osteoporosis  Chest pain  Scoliosis  Seizure  MR (mental retardation), severe: immobilty sec to severe dissability  No significant past surgical history      MEDICATIONS  (STANDING):  ALBUTerol    90 MICROgram(s) HFA Inhaler 2 Puff(s) Inhalation every 6 hours  apixaban 5 milliGRAM(s) Oral two times a day  ascorbic acid 500 milliGRAM(s) Oral two times a day  calcium carbonate 1250 mG  + Vitamin D (OsCal 500 + D) 1 Tablet(s) Oral daily  cholecalciferol 2000 Unit(s) Oral two times a day  lactulose Syrup 10 Gram(s) Oral three times a day  latanoprost 0.005% Ophthalmic Solution 1 Drop(s) Both EYES at bedtime  methylPREDNISolone sodium succinate Injectable 60 milliGRAM(s) IV Push daily  midodrine. 2.5 milliGRAM(s) Oral three times a day  montelukast 10 milliGRAM(s) Oral at bedtime  pantoprazole   Suspension 40 milliGRAM(s) Oral daily  senna 2 Tablet(s) Oral at bedtime  tiotropium 18 MICROgram(s) Capsule 1 Capsule(s) Inhalation daily  valproate sodium IVPB 250 milliGRAM(s) IV Intermittent three times a day  verapamil 80 milliGRAM(s) Oral two times a day    MEDICATIONS  (PRN):  acetaminophen  Suppository .. 650 milliGRAM(s) Rectal every 4 hours PRN Temp greater or equal to 38C (100.4F)  acetaminophen  Suppository .. 650 milliGRAM(s) Rectal every 4 hours PRN Mild Pain (1 - 3)  LORazepam   Injectable 0.5 milliGRAM(s) IV Push every 3 hours PRN acute seizures      Allergies    barbiturates (Unknown)  Colace (Unknown)  docusate (Unknown)  phenobarbital (Unknown)    Intolerances        REVIEW OF SYSTEMS:    CONSTITUTIONAL: No weakness, fevers or chills.   EYES/ENT: No visual changes;  No vertigo or throat pain   NECK: No pain or stiffness  RESPIRATORY: No cough, wheezing, hemoptysis; No shortness of breath  CARDIOVASCULAR: No chest pain or palpitations  GASTROINTESTINAL: No abdominal or epigastric pain. No nausea, vomiting, or hematemesis; No diarrhea or constipation. No melena or hematochezia.  GENITOURINARY: No dysuria, frequency or hematuria  NEUROLOGICAL: No numbness or weakness  SKIN: No itching, burning, rashes, or lesions   All other review of systems is negative unless indicated above    Vital Signs Last 24 Hrs  T(C): 36.4 (02 Apr 2020 08:23), Max: 37.1 (01 Apr 2020 17:06)  T(F): 97.6 (02 Apr 2020 08:23), Max: 98.7 (01 Apr 2020 17:06)  HR: 75 (02 Apr 2020 08:23) (75 - 102)  BP: 105/69 (02 Apr 2020 08:23) (105/69 - 152/85)  BP(mean): --  RR: 20 (02 Apr 2020 08:23) (20 - 20)  SpO2: 90% (02 Apr 2020 08:23) (90% - 93%)    I&O's Summary    01 Apr 2020 07:01  -  02 Apr 2020 07:00  --------------------------------------------------------  IN: 600 mL / OUT: 0 mL / NET: 600 mL    02 Apr 2020 07:01  -  02 Apr 2020 13:04  --------------------------------------------------------  IN: 0 mL / OUT: 700 mL / NET: -700 mL        PHYSICAL EXAM:    Constitutional: NAD,   Eyes:  EOMI,  Pupils round, No oral cyanosis.  HEENT: No exudate or erythema  Pulmonary: Decreased BS b/l  Cardiovascular: Regular, S1 and S2, No murmurs  Gastrointestinal: Bowel Sounds present, soft, nontender.   Ext: No significant LE edema   Neurological: Alert,   Skin: No rashes.  LABS: All Labs Reviewed:                        13.5   12.73 )-----------( 387      ( 02 Apr 2020 07:18 )             41.8     04-02    146<H>  |  107  |  11  ----------------------------<  97  3.2<L>   |  33<H>  |  0.30<L>    Ca    8.7      02 Apr 2020 07:18  Mg     1.6     04-01    TPro  6.9  /  Alb  2.5<L>  /  TBili  0.4  /  DBili  x   /  AST  29  /  ALT  24  /  AlkPhos  67  04-02    PT/INR - ( 02 Apr 2020 07:18 )   PT: 15.0 sec;   INR: 1.33 ratio           CARDIAC MARKERS ( 02 Apr 2020 07:18 )  <.015 ng/mL / x     / 45 U/L / x     / x      CARDIAC MARKERS ( 01 Apr 2020 09:01 )  x     / x     / 65 U/L / x     / x          Blood Culture: Organism --  Gram Stain Blood -- Gram Stain --  Specimen Source .Blood Blood-Peripheral  Culture-Blood --      03-30 @ 14:26  Pro Bnp [id number]    · Assessment		  59F with known PAF currently in atrial fibrillation in the setting of cerebral palsy, Mental retardation, COPD, PAD.  She has a CHADSVasc Score of 3 (Female, PAD, HTN).  Heart rates ok, low 100s.      Suggest:  1. Continue Eliquis 5mg twice daily.   2. s/p Furosemide for chronic diastolic HF  3. Will get echo at a later time.  No acute CHF at this time.  4. Supportive care for COVID treatment  5. Continue with Verapamil 80 BID,  in the setting of COPD   6. Will follow as needed in the setting of COVID.

## 2020-04-02 NOTE — PROGRESS NOTE ADULT - ASSESSMENT
JAYA BEJARANO [facility] P [phone]  1960 DOA 3/30/2020  DR MODESTA RUBY     REVIEW OF SYMPTOMS      Able to give ROS  NO     PHYSICAL EXAM    HEENT Unremarkable PERRLA atraumatic   RESP Fair air entry EXP prolonged    Harsh breath sound Resp distres mild   CARDIAC S1 S2 No S3     NO JVD    ABDOMEN SOFT BS PRESENT NOT DISTENDED No hepatosplenomegaly PEDAL EDEMA present No calf tenderness  NO rash   GENERAL Not TOXIC looking    VITALS/LABS      2020 afeb 75 130/70  2020 w 12.7 Hb 13.5 Plt 387   Na 146 K 3.2 CO2 33 Cr .3     PT DATA/BEST PRACTICE  ALLERGY     NOTEWORTHY  POINTS/CHANGES ROS/PE                                  WT  59 (3/30/2020)                    BMI   27 (3/30/2020)        ADVANCED DIRECTIVE       Goals of care discussion                                                                                      HEAD OF BED ELEVATION Yes  DYSPHAGIA EVAL                                  DIET      dys 1 puree   honey (3/29)                                  IV F   ns 100 (3/29)  --> NS 50 (3/31)                                                    DVT PROPHYLAXIS   M apixa 5.2 (3/30)  ( a fib)                  STRESS ULCER PROPHYLAXIS                                                                  INFECTION PPLX    ECHO  BNP 3/31/2020 bnp 3539                  CXR      2020 cxr porfression of bl airspace dz     3/30/2020 cxr incr bl perihilar markings                   CT                                                                              MICROBIO         3/30/2020 Blod c n   3/31/2020 pc n           ABIO                PROCEDURE                                 BRIEF ASSESSMENT PLAN      59 F HO CP MR PA fib c DHF ADMITTED 3/29 WITH COVID PNEUMONIA RESP FAILURE   RESP FAILURE IF WORSE ICU    COPD ON BD   PA fib ON APIXABAN   DCHF MONITORED   lasix 20 ()   NONVIOLENT NONSELFDESTR LEVLE ONE Applied 2020        COVID  Supp care   SYMPTOM ONSET   Poss 3/27  OXYGENATION  Requiring nrb  PROGNOSIS   NLR    3/30-3/31--  4.8  - 8.3- 7.3 -9.4           MEDS   solumed 60.5d () (Dr SZYMANSKI)   PLAN   Supp care   Early intubation if desat   Avoid nebs bpap       TIME SPENT Over 25 minutes aggregate care time spent on encounter; activities included   direct pt care, counseling and/or coordinating care reviewing notes, lab data/ imaging , discussion with multidisciplinary team/ pt /family. Risks, benefits, alternatives  discussed in detail.    JAYA MCGEEI [facility] P [phone]  1960 DOA 3/30/2020  DR MODESTA RUBY

## 2020-04-02 NOTE — PROGRESS NOTE ADULT - SUBJECTIVE AND OBJECTIVE BOX
PAST MEDICAL & SURGICAL HISTORY:  Osteoporosis  CP (cerebral palsy)  Scoliosis  Mentally challenged  Epilepsy  COPD (chronic obstructive pulmonary disease)  PVD (peripheral vascular disease)  Osteoporosis  Chest pain  Scoliosis  Seizure  MR (mental retardation), severe: immobilty sec to severe dissability  No significant past surgical history      MEDICATIONS  (STANDING):  ALBUTerol    90 MICROgram(s) HFA Inhaler 2 Puff(s) Inhalation every 6 hours  apixaban 5 milliGRAM(s) Oral two times a day  ascorbic acid 500 milliGRAM(s) Oral two times a day  calcium carbonate 1250 mG  + Vitamin D (OsCal 500 + D) 1 Tablet(s) Oral daily  cholecalciferol 2000 Unit(s) Oral two times a day  lactulose Syrup 10 Gram(s) Oral three times a day  latanoprost 0.005% Ophthalmic Solution 1 Drop(s) Both EYES at bedtime  methylPREDNISolone sodium succinate Injectable 60 milliGRAM(s) IV Push daily  midodrine. 2.5 milliGRAM(s) Oral three times a day  montelukast 10 milliGRAM(s) Oral at bedtime  pantoprazole   Suspension 40 milliGRAM(s) Oral daily  senna 2 Tablet(s) Oral at bedtime  tiotropium 18 MICROgram(s) Capsule 1 Capsule(s) Inhalation daily  valproate sodium IVPB 250 milliGRAM(s) IV Intermittent three times a day  verapamil 80 milliGRAM(s) Oral two times a day    MEDICATIONS  (PRN):  acetaminophen  Suppository .. 650 milliGRAM(s) Rectal every 4 hours PRN Temp greater or equal to 38C (100.4F)  acetaminophen  Suppository .. 650 milliGRAM(s) Rectal every 4 hours PRN Mild Pain (1 - 3)  LORazepam   Injectable 0.5 milliGRAM(s) IV Push every 3 hours PRN acute seizures      Patient is a 59y old  Female who presents with a chief complaint of covid19+  was sent for low oxygen (02 Apr 2020 13:04)      Vital Signs Last 24 Hrs  T(C): 36.4 (02 Apr 2020 17:15), Max: 36.8 (02 Apr 2020 02:46)  T(F): 97.6 (02 Apr 2020 17:15), Max: 98.2 (02 Apr 2020 02:46)  HR: 104 (02 Apr 2020 17:15) (75 - 104)  BP: 138/75 (02 Apr 2020 17:15) (105/69 - 143/71)  BP(mean): --  RR: 20 (02 Apr 2020 17:15) (20 - 20)  SpO2: 98% (02 Apr 2020 17:15) (90% - 98%)          04-01 @ 07:01  -  04-02 @ 07:00  --------------------------------------------------------  IN: 600 mL / OUT: 0 mL / NET: 600 mL    04-02 @ 07:01 - 04-02 @ 19:21  --------------------------------------------------------  IN: 0 mL / OUT: 700 mL / NET: -700 mL        REVIEW OF SYSTEMS:    Constitutional: No fever, weight loss or fatigue  Eyes: No eye pain, visual disturbances, or discharge  ENT:  No difficulty hearing, tinnitus, vertigo; No sinus or throat pain  Neck: No pain or stiffness  Breasts: No pain, masses or nipple discharge  Respiratory: No cough, wheezing, chills or hemoptysis  Cardiovascular: No chest pain, palpitations, shortness of breath, dizziness or leg swelling  Gastrointestinal: No abdominal or epigastric pain. No nausea, vomiting or hematemesis; No diarrhea or constipation. No melena or hematochezia.  Genitourinary: No dysuria, frequency, hematuria or incontinence  Rectal: No pain, hemorrhoids or incontinence  Neurological: No headaches, memory loss, loss of strength, numbness or tremors  Skin: No itching, burning, rashes or lesions   Lymph Nodes: No enlarged glands  Endocrine: No heat or cold intolerance; No hair loss  Musculoskeletal: No joint pain or swelling; No muscle, back or extremity pain  Psychiatric: No depression, anxiety, mood swings or difficulty sleeping  Heme/Lymph: No easy bruising or bleeding gums  Allergy and Immunologic: No hives or eczema    PHYSICAL EXAM:  on solumedrol  breathing much improved  Constitutional: NAD, well-groomed, well-developed  HEENT: PERRLA, EOMI, Normal Hearing, MMM  Neck: No LAD, No JVD  Back: Normal spine flexure, No CVA tenderness  Respiratory: CTAB/L   Cardiovascular: S1 and S2, RRR, no M/G/R  Gastrointestinal: BS+, soft, NT/ND  Extremities: No peripheral edema  Vascular: 2+ peripheral pulses  Neurological: A/O x 3, no focal deficits  Skin: No rashes      decubiti:                           13.5   12.73 )-----------( 387      ( 02 Apr 2020 07:18 )             41.8     04-02    146<H>  |  107  |  11  ----------------------------<  97  3.2<L>   |  33<H>  |  0.30<L>    Ca    8.7      02 Apr 2020 07:18  Mg     1.6     04-01    TPro  6.9  /  Alb  2.5<L>  /  TBili  0.4  /  DBili  x   /  AST  29  /  ALT  24  /  AlkPhos  67  04-02    PT/INR - ( 02 Apr 2020 07:18 )   PT: 15.0 sec;   INR: 1.33 ratio             CARDIAC MARKERS ( 02 Apr 2020 07:18 )  <.015 ng/mL / x     / 45 U/L / x     / x      CARDIAC MARKERS ( 01 Apr 2020 09:01 )  x     / x     / 65 U/L / x     / x          04-02 @ 07:18    TSH: --  B12:  --   Folate: --   KEV: --   Rheumatoid: --   Ammonia:  --   CPK:  --  Total PSA:  --  Free PSA: --  Cortisol: --  C-Diff:  --  BNP:  --  SPEP: --  Troponin:  <.015  CKMB: --  Valproic acid level:  --  tegretol level: --  dilantin level:  --  phenobarb level: --  keppra level:  --    .Blood Blood-Peripheral  03-29 @ 10:42   No Growth Final  --  --        Urine Culture:  03-29 @ 10:42    --        No Growth Final        Radiology:

## 2020-04-02 NOTE — PROGRESS NOTE ADULT - ASSESSMENT
59f with cerebral palsy, seizure, copd admitted with hypoxia and is COVID 19+  COVID-19---high risk period for decompensation  (7-14 days post symptom onset), so critical to determine date of symptom onset,  avoid aerosolizing procedures,  focus on supportive care, avoid excessive blood draws so limit lab testing and draws - do recommend for hospitalized patients  -For all patients: daily BMP and CBC w diff to follow eos, lymphocytes and neutrophils and daily   NLR baseline and dynamic calculation (NLR <3 low vs >5 high) -other labs at admission to risk stratify and predict clinical course,  Procalcitonin (>0.2 associated with more severe disease),  Ferritin (lower risk <450 vs >850),  CRP (low risk <2 and higher risk >6) , D-dimer (<1,000 vs >1,000)   and if prognosis is unclear or if immunomodulators being considered: LDH, ESR, PT/PTT, CK, total, CKMB mass assay, lactate, troponin, IL-6 (and other interleukins as indicated)  -antibiotics only if there is concern for a bacterial process, HCQ 400mg PO BID day 1 followed by 200mg PO BID x 4 days may play a role if started early in disease, noted that methylprednisolone (SOLU-medrol) 1mg/kg/day IV x 5 days at onset of increased oxygen requirement (potential to finsih w PO) and IL6 inhibition during this critical window may be associated with avoiding intubation and improved outcomes  4/2 NLR =9.5 [id number] started with plan for 5 days-prognosis is guarded

## 2020-04-02 NOTE — PROGRESS NOTE ADULT - PROBLEM SELECTOR PLAN 1
Thank you for consulting us and involving us in the management of this most interesting and challenging case. I certainly appreciate the challenges, stress and sacrifice during these difficult and challenging times.  Please call with any further questions or changes in clinical status for which ID input would be helpful

## 2020-04-02 NOTE — PROGRESS NOTE ADULT - SUBJECTIVE AND OBJECTIVE BOX
[place]    [address] 242 W1    Patient is a 59y old  Female who presents with a chief complaint of covid19+  was sent for low oxygen (02 Apr 2020 19:21)       Allergies    barbiturates (Unknown)  Colace (Unknown)  docusate (Unknown)  phenobarbital (Unknown)    Intolerances        HPI:  patient was tested +covid19 few days ago  was sent by house for low oxygen (29 Mar 2020 01:07)      PAST MEDICAL & SURGICAL HISTORY:  Osteoporosis  CP (cerebral palsy)  Scoliosis  Mentally challenged  Epilepsy  COPD (chronic obstructive pulmonary disease)  PVD (peripheral vascular disease)  Osteoporosis  Chest pain  Scoliosis  Seizure  MR (mental retardation), severe: immobilty sec to severe dissability  No significant past surgical history      FAMILY HISTORY:        MEDICATIONS   acetaminophen  Suppository .. 650 milliGRAM(s) Rectal every 4 hours PRN  acetaminophen  Suppository .. 650 milliGRAM(s) Rectal every 4 hours PRN  ALBUTerol    90 MICROgram(s) HFA Inhaler 2 Puff(s) Inhalation every 6 hours  apixaban 5 milliGRAM(s) Oral two times a day  ascorbic acid 500 milliGRAM(s) Oral two times a day  calcium carbonate 1250 mG  + Vitamin D (OsCal 500 + D) 1 Tablet(s) Oral daily  cholecalciferol 2000 Unit(s) Oral two times a day  lactulose Syrup 10 Gram(s) Oral three times a day  latanoprost 0.005% Ophthalmic Solution 1 Drop(s) Both EYES at bedtime  LORazepam   Injectable 0.5 milliGRAM(s) IV Push every 3 hours PRN  methylPREDNISolone sodium succinate Injectable 60 milliGRAM(s) IV Push daily  midodrine. 2.5 milliGRAM(s) Oral three times a day  montelukast 10 milliGRAM(s) Oral at bedtime  pantoprazole   Suspension 40 milliGRAM(s) Oral daily  senna 2 Tablet(s) Oral at bedtime  tiotropium 18 MICROgram(s) Capsule 1 Capsule(s) Inhalation daily  valproate sodium IVPB 250 milliGRAM(s) IV Intermittent three times a day  verapamil 80 milliGRAM(s) Oral two times a day      Vital Signs Last 24 Hrs  T(C): 36.4 (02 Apr 2020 17:15), Max: 36.8 (02 Apr 2020 02:46)  T(F): 97.6 (02 Apr 2020 17:15), Max: 98.2 (02 Apr 2020 02:46)  HR: 104 (02 Apr 2020 17:15) (75 - 104)  BP: 138/75 (02 Apr 2020 17:15) (105/69 - 143/71)  BP(mean): --  RR: 20 (02 Apr 2020 17:15) (20 - 20)  SpO2: 98% (02 Apr 2020 17:15) (90% - 98%)      04-01-20 @ 07:01  -  04-02-20 @ 07:00  --------------------------------------------------------  IN: 600 mL / OUT: 0 mL / NET: 600 mL    04-02-20 @ 07:01  -  04-02-20 @ 21:01  --------------------------------------------------------  IN: 0 mL / OUT: 700 mL / NET: -700 mL            LABS:                        13.5   12.73 )-----------( 387      ( 02 Apr 2020 07:18 )             41.8     04-02    146<H>  |  107  |  11  ----------------------------<  97  3.2<L>   |  33<H>  |  0.30<L>    Ca    8.7      02 Apr 2020 07:18  Mg     1.6     04-01    TPro  6.9  /  Alb  2.5<L>  /  TBili  0.4  /  DBili  x   /  AST  29  /  ALT  24  /  AlkPhos  67  04-02    PT/INR - ( 02 Apr 2020 07:18 )   PT: 15.0 sec;   INR: 1.33 ratio               ABG - ( 01 Apr 2020 09:48 )  pH, Arterial: 7.34  pH, Blood: x     /  pCO2: 52    /  pO2: 63    / HCO3: 25    / Base Excess: 1.7   /  SaO2: 91                  WBC:  WBC Count: 12.73 K/uL (04-02 @ 07:18)  WBC Count: 12.60 K/uL (04-01 @ 09:01)  WBC Count: 11.90 K/uL (03-31 @ 11:20)  WBC Count: 6.90 K/uL (03-30 @ 14:26)      MICROBIOLOGY:  RECENT CULTURES:  03-29 .Blood Blood-Peripheral XXXX XXXX   No Growth Final          CARDIAC MARKERS ( 02 Apr 2020 07:18 )  <.015 ng/mL / x     / 45 U/L / x     / x      CARDIAC MARKERS ( 01 Apr 2020 09:01 )  x     / x     / 65 U/L / x     / x            PT/INR - ( 02 Apr 2020 07:18 )   PT: 15.0 sec;   INR: 1.33 ratio             Sodium:  Sodium, Serum: 146 mmol/L (04-02 @ 07:18)  Sodium, Serum: 145 mmol/L (04-01 @ 09:01)  Sodium, Serum: 145 mmol/L (03-31 @ 11:19)  Sodium, Serum: 145 mmol/L (03-30 @ 14:26)      0.30 mg/dL 04-02 @ 07:18  0.39 mg/dL 04-01 @ 09:01  0.32 mg/dL 03-31 @ 11:19  0.41 mg/dL 03-30 @ 14:26      Hemoglobin:  Hemoglobin: 13.5 g/dL (04-02 @ 07:18)  Hemoglobin: 13.2 g/dL (04-01 @ 09:01)  Hemoglobin: 12.7 g/dL (03-31 @ 11:20)  Hemoglobin: 13.2 g/dL (03-30 @ 14:26)      Platelets: Platelet Count - Automated: 387 K/uL (04-02 @ 07:18)  Platelet Count - Automated: 320 K/uL (04-01 @ 09:01)  Platelet Count - Automated: 275 K/uL (03-31 @ 11:20)  Platelet Count - Automated: 219 K/uL (03-30 @ 14:26)      LIVER FUNCTIONS - ( 02 Apr 2020 07:18 )  Alb: 2.5 g/dL / Pro: 6.9 g/dL / ALK PHOS: 67 U/L / ALT: 24 U/L / AST: 29 U/L / GGT: x                 RADIOLOGY & ADDITIONAL STUDIES:

## 2020-04-03 LAB
BASE EXCESS BLDA CALC-SCNC: 7.5 MMOL/L — HIGH (ref -2–2)
BASE EXCESS BLDA CALC-SCNC: 9.2 MMOL/L — HIGH (ref -2–2)
BLOOD GAS COMMENTS ARTERIAL: SIGNIFICANT CHANGE UP
HCO3 BLDA-SCNC: 29 MMOL/L — HIGH (ref 23–27)
HCO3 BLDA-SCNC: 30 MMOL/L — HIGH (ref 23–27)
HOROWITZ INDEX BLDA+IHG-RTO: 100 — SIGNIFICANT CHANGE UP
HOROWITZ INDEX BLDA+IHG-RTO: SIGNIFICANT CHANGE UP
PCO2 BLDA: 68 MMHG — HIGH (ref 32–46)
PCO2 BLDA: 71 MMHG — CRITICAL HIGH (ref 32–46)
PH BLDA: 7.3 — LOW (ref 7.35–7.45)
PH BLDA: 7.33 — LOW (ref 7.35–7.45)
PO2 BLDA: 50 MMHG — CRITICAL LOW (ref 74–108)
PO2 BLDA: 64 MMHG — LOW (ref 74–108)
SAO2 % BLDA: 82 % — LOW (ref 92–96)
SAO2 % BLDA: 90 % — LOW (ref 92–96)

## 2020-04-03 PROCEDURE — 71045 X-RAY EXAM CHEST 1 VIEW: CPT | Mod: 26

## 2020-04-03 PROCEDURE — 99291 CRITICAL CARE FIRST HOUR: CPT

## 2020-04-03 RX ORDER — HYDROXYCHLOROQUINE SULFATE 200 MG
200 TABLET ORAL EVERY 12 HOURS
Refills: 0 | Status: COMPLETED | OUTPATIENT
Start: 2020-04-04 | End: 2020-04-07

## 2020-04-03 RX ORDER — FUROSEMIDE 40 MG
20 TABLET ORAL ONCE
Refills: 0 | Status: COMPLETED | OUTPATIENT
Start: 2020-04-03 | End: 2020-04-03

## 2020-04-03 RX ORDER — HYDROXYCHLOROQUINE SULFATE 200 MG
400 TABLET ORAL EVERY 12 HOURS
Refills: 0 | Status: COMPLETED | OUTPATIENT
Start: 2020-04-03 | End: 2020-04-03

## 2020-04-03 RX ORDER — METOPROLOL TARTRATE 50 MG
2.5 TABLET ORAL ONCE
Refills: 0 | Status: COMPLETED | OUTPATIENT
Start: 2020-04-03 | End: 2020-04-03

## 2020-04-03 RX ORDER — HYDROXYCHLOROQUINE SULFATE 200 MG
TABLET ORAL
Refills: 0 | Status: COMPLETED | OUTPATIENT
Start: 2020-04-03 | End: 2020-04-07

## 2020-04-03 RX ORDER — FUROSEMIDE 40 MG
10 TABLET ORAL ONCE
Refills: 0 | Status: COMPLETED | OUTPATIENT
Start: 2020-04-03 | End: 2020-04-03

## 2020-04-03 RX ADMIN — APIXABAN 5 MILLIGRAM(S): 2.5 TABLET, FILM COATED ORAL at 05:54

## 2020-04-03 RX ADMIN — Medication 2.5 MILLIGRAM(S): at 15:54

## 2020-04-03 RX ADMIN — Medication 60 MILLIGRAM(S): at 05:54

## 2020-04-03 RX ADMIN — Medication 10 MILLIGRAM(S): at 13:07

## 2020-04-03 RX ADMIN — Medication 20 MILLIGRAM(S): at 08:21

## 2020-04-03 RX ADMIN — LACTULOSE 10 GRAM(S): 10 SOLUTION ORAL at 05:54

## 2020-04-03 RX ADMIN — Medication 100 MILLIGRAM(S): at 21:09

## 2020-04-03 RX ADMIN — Medication 100 MILLIGRAM(S): at 05:55

## 2020-04-03 RX ADMIN — MONTELUKAST 10 MILLIGRAM(S): 4 TABLET, CHEWABLE ORAL at 21:09

## 2020-04-03 RX ADMIN — ALBUTEROL 2 PUFF(S): 90 AEROSOL, METERED ORAL at 09:11

## 2020-04-03 RX ADMIN — Medication 400 MILLIGRAM(S): at 21:09

## 2020-04-03 RX ADMIN — MIDODRINE HYDROCHLORIDE 2.5 MILLIGRAM(S): 2.5 TABLET ORAL at 05:54

## 2020-04-03 RX ADMIN — Medication 2000 UNIT(S): at 07:35

## 2020-04-03 RX ADMIN — Medication 500 MILLIGRAM(S): at 05:54

## 2020-04-03 RX ADMIN — Medication 100 MILLIGRAM(S): at 15:55

## 2020-04-03 RX ADMIN — Medication 80 MILLIGRAM(S): at 05:54

## 2020-04-03 RX ADMIN — Medication 60 MILLIGRAM(S): at 09:10

## 2020-04-03 RX ADMIN — Medication 400 MILLIGRAM(S): at 09:10

## 2020-04-03 RX ADMIN — ALBUTEROL 2 PUFF(S): 90 AEROSOL, METERED ORAL at 03:16

## 2020-04-03 NOTE — PROVIDER CONTACT NOTE (CRITICAL VALUE NOTIFICATION) - ACTION/TREATMENT ORDERED:
Dr. Bautista made aware, orders to give Tylenol, will continue to monitor
no further action at this time as per CCPA Aleksandr

## 2020-04-03 NOTE — PROGRESS NOTE ADULT - SUBJECTIVE AND OBJECTIVE BOX
[facility] Card[facility] Progress Note [phone] (Dr. Recinos, Michelle, Bernardo, Jaspreet)    CHIEF COMPLAINT: Patient is a 59y old  Female who presents with a chief complaint of covid19+  was sent for low oxygen (02 Apr 2020 21:01)      Follow Up Today: The patient denies any chest discomfort or shortness of breath.    HPI:  patient was tested +covid19 few days ago  was sent by house for low oxygen (29 Mar 2020 01:07)      PAST MEDICAL & SURGICAL HISTORY:  Osteoporosis  CP (cerebral palsy)  Scoliosis  Mentally challenged  Epilepsy  COPD (chronic obstructive pulmonary disease)  PVD (peripheral vascular disease)  Osteoporosis  Chest pain  Scoliosis  Seizure  MR (mental retardation), severe: immobilty sec to severe dissability  No significant past surgical history      MEDICATIONS  (STANDING):  ALBUTerol    90 MICROgram(s) HFA Inhaler 2 Puff(s) Inhalation every 6 hours  apixaban 5 milliGRAM(s) Oral two times a day  ascorbic acid 500 milliGRAM(s) Oral two times a day  calcium carbonate 1250 mG  + Vitamin D (OsCal 500 + D) 1 Tablet(s) Oral daily  cholecalciferol 2000 Unit(s) Oral two times a day  hydroxychloroquine 400 milliGRAM(s) Oral every 12 hours  hydroxychloroquine   Oral   lactulose Syrup 10 Gram(s) Oral three times a day  latanoprost 0.005% Ophthalmic Solution 1 Drop(s) Both EYES at bedtime  methylPREDNISolone sodium succinate Injectable 60 milliGRAM(s) IV Push daily  midodrine. 2.5 milliGRAM(s) Oral three times a day  montelukast 10 milliGRAM(s) Oral at bedtime  pantoprazole   Suspension 40 milliGRAM(s) Oral daily  senna 2 Tablet(s) Oral at bedtime  tiotropium 18 MICROgram(s) Capsule 1 Capsule(s) Inhalation daily  valproate sodium IVPB 250 milliGRAM(s) IV Intermittent three times a day  verapamil 80 milliGRAM(s) Oral two times a day    MEDICATIONS  (PRN):  acetaminophen  Suppository .. 650 milliGRAM(s) Rectal every 4 hours PRN Temp greater or equal to 38C (100.4F)  acetaminophen  Suppository .. 650 milliGRAM(s) Rectal every 4 hours PRN Mild Pain (1 - 3)  LORazepam   Injectable 0.5 milliGRAM(s) IV Push every 3 hours PRN acute seizures      Allergies    barbiturates (Unknown)  Colace (Unknown)  docusate (Unknown)  phenobarbital (Unknown)    Intolerances        REVIEW OF SYSTEMS:    All other review of systems is negative unless indicated above    Vital Signs Last 24 Hrs  T(C): 36.6 (03 Apr 2020 08:14), Max: 36.6 (03 Apr 2020 08:14)  T(F): 97.8 (03 Apr 2020 08:14), Max: 97.8 (03 Apr 2020 08:14)  HR: 66 (03 Apr 2020 08:14) (66 - 104)  BP: 105/63 (03 Apr 2020 08:14) (105/63 - 143/98)  BP(mean): --  RR: 20 (03 Apr 2020 08:14) (19 - 20)  SpO2: 90% (03 Apr 2020 08:14) (90% - 98%)    I&O's Summary    02 Apr 2020 07:01  -  03 Apr 2020 07:00  --------------------------------------------------------  IN: 0 mL / OUT: 700 mL / NET: -700 mL        PHYSICAL EXAM:    Constitutional: NAD, awake and alert, well-developed  Eyes:  EOMI,  Pupils round, No oral cyanosis.  HEENT: No exudate or erythema  Pulmonary: Non-labored, breath sounds are clear bilaterally, No wheezing, rales or rhonchi  Cardiovascular: Regular, S1 and S2, No murmurs, rubs, gallops oir clicks  Gastrointestinal: Bowel Sounds present, soft, nontender.   Ext: No significant LE edema with good pulses x 4  Neurological: Alert, no gross focal motor deficits  Skin: No rashes.  Psych:  Mood & affect appropriate    LABS: All Labs Reviewed:                        13.5   12.73 )-----------( 387      ( 02 Apr 2020 07:18 )             41.8                         13.2   12.60 )-----------( 320      ( 01 Apr 2020 09:01 )             40.9                         12.7   11.90 )-----------( 275      ( 31 Mar 2020 11:20 )             39.0     02 Apr 2020 07:18    146    |  107    |  11     ----------------------------<  97     3.2     |  33     |  0.30   01 Apr 2020 09:01    145    |  109    |  8      ----------------------------<  84     3.9     |  29     |  0.39   31 Mar 2020 11:19    145    |  111    |  7      ----------------------------<  105    3.2     |  29     |  0.32     Ca    8.7        02 Apr 2020 07:18  Ca    8.7        01 Apr 2020 09:01  Ca    8.8        31 Mar 2020 11:19  Phos  1.1       31 Mar 2020 11:19  Mg     1.6       01 Apr 2020 09:01    TPro  6.9    /  Alb  2.5    /  TBili  0.4    /  DBili  x      /  AST  29     /  ALT  24     /  AlkPhos  67     02 Apr 2020 07:18  TPro  6.6    /  Alb  2.5    /  TBili  0.4    /  DBili  x      /  AST  33     /  ALT  24     /  AlkPhos  58     01 Apr 2020 09:01  TPro  6.2    /  Alb  2.4    /  TBili  0.4    /  DBili  x      /  AST  36     /  ALT  23     /  AlkPhos  48     31 Mar 2020 11:19    PT/INR - ( 02 Apr 2020 07:18 )   PT: 15.0 sec;   INR: 1.33 ratio           CARDIAC MARKERS ( 02 Apr 2020 07:18 )  <.015 ng/mL / x     / 45 U/L / x     / x          Blood Culture: Organism --  Gram Stain Blood -- Gram Stain --  Specimen Source .Blood Blood-Peripheral  Culture-Blood --            RADIOLOGY/EKG:    Attending Attestation:   20 minutes spent on total encounter; more than 50% of the visit was spent counseling and/or coordinating care by the attending physician.     ASSESSMENT AND PLAN [facility] Progress Note [phone] (Dr. Recinos, Michelle, Bernardo, Jaspreet)    CHIEF COMPLAINT: Patient is a 59y old  Female who presents with a chief complaint of covid19+  was sent for low oxygen (02 Apr 2020 21:01)      Follow Up Today: The patient is non-verbal.  Patient with episodes of hypoxia through the night.  She appears on non-rebreather without severe respiratory distress.    HPI:  patient was tested +covid19 few days ago  was sent by house for low oxygen (29 Mar 2020 01:07)      PAST MEDICAL & SURGICAL HISTORY:  Osteoporosis  CP (cerebral palsy)  Scoliosis  Mentally challenged  Epilepsy  COPD (chronic obstructive pulmonary disease)  PVD (peripheral vascular disease)  Osteoporosis  Chest pain  Scoliosis  Seizure  MR (mental retardation), severe: immobilty sec to severe dissability  No significant past surgical history      MEDICATIONS  (STANDING):  ALBUTerol    90 MICROgram(s) HFA Inhaler 2 Puff(s) Inhalation every 6 hours  apixaban 5 milliGRAM(s) Oral two times a day  ascorbic acid 500 milliGRAM(s) Oral two times a day  calcium carbonate 1250 mG  + Vitamin D (OsCal 500 + D) 1 Tablet(s) Oral daily  cholecalciferol 2000 Unit(s) Oral two times a day  hydroxychloroquine 400 milliGRAM(s) Oral every 12 hours  hydroxychloroquine   Oral   lactulose Syrup 10 Gram(s) Oral three times a day  latanoprost 0.005% Ophthalmic Solution 1 Drop(s) Both EYES at bedtime  methylPREDNISolone sodium succinate Injectable 60 milliGRAM(s) IV Push daily  midodrine. 2.5 milliGRAM(s) Oral three times a day  montelukast 10 milliGRAM(s) Oral at bedtime  pantoprazole   Suspension 40 milliGRAM(s) Oral daily  senna 2 Tablet(s) Oral at bedtime  tiotropium 18 MICROgram(s) Capsule 1 Capsule(s) Inhalation daily  valproate sodium IVPB 250 milliGRAM(s) IV Intermittent three times a day  verapamil 80 milliGRAM(s) Oral two times a day    MEDICATIONS  (PRN):  acetaminophen  Suppository .. 650 milliGRAM(s) Rectal every 4 hours PRN Temp greater or equal to 38C (100.4F)  acetaminophen  Suppository .. 650 milliGRAM(s) Rectal every 4 hours PRN Mild Pain (1 - 3)  LORazepam   Injectable 0.5 milliGRAM(s) IV Push every 3 hours PRN acute seizures      Allergies    barbiturates (Unknown)  Colace (Unknown)  docusate (Unknown)  phenobarbital (Unknown)    Intolerances        REVIEW OF SYSTEMS:    All other review of systems is negative unless indicated above    Vital Signs Last 24 Hrs  T(C): 36.6 (03 Apr 2020 08:14), Max: 36.6 (03 Apr 2020 08:14)  T(F): 97.8 (03 Apr 2020 08:14), Max: 97.8 (03 Apr 2020 08:14)  HR: 66 (03 Apr 2020 08:14) (66 - 104)  BP: 105/63 (03 Apr 2020 08:14) (105/63 - 143/98)  BP(mean): --  RR: 20 (03 Apr 2020 08:14) (19 - 20)  SpO2: 90% (03 Apr 2020 08:14) (90% - 98%)    I&O's Summary    02 Apr 2020 07:01  -  03 Apr 2020 07:00  --------------------------------------------------------  IN: 0 mL / OUT: 700 mL / NET: -700 mL        PHYSICAL EXAM:    Constitutional: Minimal respiratory distress, awake and alert, well-developed  Eyes:  EOMI,  Pupils round, No oral cyanosis.  HEENT: No exudate or erythema  Pulmonary: Decreased breath sounds bilaterally  Cardiovascular: Tachycardic, S1 and S2, No murmurs,  Gastrointestinal: Bowel Sounds present, soft, nontender.   Ext: Trivial LE edema   Neurological: Alert, no gross focal motor deficits  Skin: No rashes.  Psych:  MRDD    LABS: All Labs Reviewed:                        13.5   12.73 )-----------( 387      ( 02 Apr 2020 07:18 )             41.8                         13.2   12.60 )-----------( 320      ( 01 Apr 2020 09:01 )             40.9                         12.7   11.90 )-----------( 275      ( 31 Mar 2020 11:20 )             39.0     02 Apr 2020 07:18    146    |  107    |  11     ----------------------------<  97     3.2     |  33     |  0.30   01 Apr 2020 09:01    145    |  109    |  8      ----------------------------<  84     3.9     |  29     |  0.39   31 Mar 2020 11:19    145    |  111    |  7      ----------------------------<  105    3.2     |  29     |  0.32     Ca    8.7        02 Apr 2020 07:18  Ca    8.7        01 Apr 2020 09:01  Ca    8.8        31 Mar 2020 11:19  Phos  1.1       31 Mar 2020 11:19  Mg     1.6       01 Apr 2020 09:01    TPro  6.9    /  Alb  2.5    /  TBili  0.4    /  DBili  x      /  AST  29     /  ALT  24     /  AlkPhos  67     02 Apr 2020 07:18  TPro  6.6    /  Alb  2.5    /  TBili  0.4    /  DBili  x      /  AST  33     /  ALT  24     /  AlkPhos  58     01 Apr 2020 09:01  TPro  6.2    /  Alb  2.4    /  TBili  0.4    /  DBili  x      /  AST  36     /  ALT  23     /  AlkPhos  48     31 Mar 2020 11:19    PT/INR - ( 02 Apr 2020 07:18 )   PT: 15.0 sec;   INR: 1.33 ratio           CARDIAC MARKERS ( 02 Apr 2020 07:18 )  <.015 ng/mL / x     / 45 U/L / x     / x          Blood Culture: Organism --  Gram Stain Blood -- Gram Stain --  Specimen Source .Blood Blood-Peripheral  Culture-Blood --            RADIOLOGY/EKG:    Attending Attestation:   20 minutes spent on total encounter; more than 50% of the visit was spent counseling and/or coordinating care by the attending physician.     ASSESSMENT AND PLAN

## 2020-04-03 NOTE — PROGRESS NOTE ADULT - ASSESSMENT
59f with cerebral palsy, seizure, copd admitted with hypoxia and is COVID 19+  COVID-19---high risk period for decompensation  (7-14 days post symptom onset), so critical to determine date of symptom onset,  avoid aerosolizing procedures,  focus on supportive care, avoid excessive blood draws so limit lab testing and draws - do recommend for hospitalized patients  -For all patients: daily BMP and CBC w diff to follow eos, lymphocytes and neutrophils and daily   NLR baseline and dynamic calculation (NLR <3 low vs >5 high) -other labs at admission to risk stratify and predict clinical course,  Procalcitonin (>0.2 associated with more severe disease),  Ferritin (lower risk <450 vs >850),  CRP (low risk <2 and higher risk >6) , D-dimer (<1,000 vs >1,000)   and if prognosis is unclear or if immunomodulators being considered: LDH, ESR, PT/PTT, CK, total, CKMB mass assay, lactate, troponin, IL-6 (and other interleukins as indicated)  -antibiotics only if there is concern for a bacterial process, HCQ 400mg PO BID day 1 followed by 200mg PO BID x 4 days may play a role if started early in disease, noted that methylprednisolone (SOLU-medrol) 1mg/kg/day IV x 5 days at onset of increased oxygen requirement (potential to finsih w PO) and IL6 inhibition during this critical window may be associated with avoiding intubation and improved outcomes  4/2 NLR =9.5 STEROIDS started with plan for 5 days-prognosis is guarded  4/3 NLR pending follow and focus on supportive care

## 2020-04-03 NOTE — PROGRESS NOTE ADULT - SUBJECTIVE AND OBJECTIVE BOX
Patient is a 59y old  Female who presents with a chief complaint of covid19+  was sent for low oxygen (03 Apr 2020 09:45)    24 hour events: hypoxic on 100% NRM + NC - transferred to ICU today     REVIEW OF SYSTEMS: UTO    T(F): 95.4 (04-03-20 @ 13:45), Max: 97.8 (04-03-20 @ 08:14)  HR: 150 (04-03-20 @ 15:00) (66 - 150)  BP: 140/79 (04-03-20 @ 15:00) (105/63 - 143/98)  RR: 56 (04-03-20 @ 15:00) (19 - 56)  SpO2: 89% (04-03-20 @ 15:00) (89% - 98%)    POCT Blood Glucose.: 161 mg/dL (03 Apr 2020 10:08)    I&O's Summary    04-02 @ 07:01  -  04-03 @ 07:00  --------------------------------------------------------  IN: 0 mL / OUT: 700 mL / NET: -700 mL    PHYSICAL EXAM  General: in mild resp distress  CNS: awake, tracks, does not follow commands but movements are appropriate  HEENT: pupils equal and reactive   Resp: coarse, diminished on L   CVS: S1S2, regular, tachy  Abd: soft, NT, +BS  Ext: no edema   Skin: warm    MEDICATIONS  hydroxychloroquine Oral  hydroxychloroquine Oral  midodrine. Oral  verapamil Oral  methylPREDNISolone sodium succinate Injectable IV Push  ALBUTerol    90 MICROgram(s) HFA Inhaler Inhalation  montelukast Oral  tiotropium 18 MICROgram(s) Capsule Inhalation  acetaminophen  Suppository .. Rectal PRN  acetaminophen  Suppository .. Rectal PRN  LORazepam   Injectable IV Push PRN  valproate sodium IVPB IV Intermittent  apixaban Oral  lactulose Syrup Oral  pantoprazole   Suspension Oral  senna Oral  ascorbic acid Oral  calcium carbonate 1250 mG  + Vitamin D (OsCal 500 + D) Oral  cholecalciferol Oral  latanoprost 0.005% Ophthalmic Solution Both EYES                        13.5   12.73 )-----------( 387      ( 02 Apr 2020 07:18 )             41.8     04-02    146<H>  |  107  |  11  ----------------------------<  97  3.2<L>   |  33<H>  |  0.30<L>    Ca    8.7      02 Apr 2020 07:18    TPro  6.9  /  Alb  2.5<L>  /  TBili  0.4  /  DBili  x   /  AST  29  /  ALT  24  /  AlkPhos  67  04-02    CARDIAC MARKERS ( 02 Apr 2020 07:18 )  <.015 ng/mL / x     / 45 U/L / x     / x        PT/INR - ( 02 Apr 2020 07:18 )   PT: 15.0 sec;   INR: 1.33 ratio      Xray Chest 1 View- PORTABLE-Urgent (04.03.20 @ 10:55)  Impression: Complete opacification in the left lung may be due to dense pulmonary consolidation and/or pleural effusion.    Airspace opacification in the right lung, slightly improved in the right upper lung zone and slightly worsened in the right lower lung zone.    No evidence for pneumothorax.    Grossly stable cardiac silhouette.    CENTRAL LINE: N            LORENZANA: N                       A-LINE: N                      GLOBAL ISSUE/BEST PRACTICE  Analgesia: NA  Sedation: NA  CAM-ICU: UTO  HOB elevation: yes  Stress ulcer prophylaxis: NA  VTE prophylaxis: Y  Glycemic control: Y  Nutrition: Y    CODE STATUS: full  GOC discussion: Y

## 2020-04-03 NOTE — CHART NOTE - NSCHARTNOTEFT_GEN_A_CORE
ICU/Respiratory stat call for desaturation to 84 on 100% NRB and 10LNC. Patient is full code. ABG with O2 saturation of 90% and intubation deferred by ICU at this time. Will closely monitor. RN to call with any changes. Continue current management. Dr. Ad Mosqueda aware. Patients parents contacted and aware. Patient remains full code.

## 2020-04-03 NOTE — PROGRESS NOTE ADULT - ASSESSMENT
59F with known PAF currently in atrial fibrillation in the setting of cerebral palsy, Mental retardation, COPD, PAD.  She has a CHADSVasc Score of 3 (Female, PAD, HTN).  Heart rates ok, low 100s.      Suggest:  1. Continue Eliquis 5mg twice daily.   2. s/p Furosemide for chronic diastolic HF  3. Will get echo at a later time.  No acute CHF at this time.  4. Supportive care for COVID treatment  5. Continue with Verapamil 80 BID,  in the setting of COPD   6. Will follow as needed in the setting of COVID. 59F with known PAF currently in atrial fibrillation in the setting of cerebral palsy, Mental retardation, COPD, PAD.  She has a CHADSVasc Score of 3 (Female, PAD, HTN).  Heart rates are better but significant hypoxia at 83% on non-rebreather. Needs ICU monitoring.    Suggest:  1. Continue Eliquis 5mg twice daily.   2. s/p IV Furosemide for possible acute on chronic diastolic HF  3. Get echo   4. Supportive care for COVID treatment  5. Continue with Verapamil 80 BID,  in the setting of COPD   6. Will follow as needed in the setting of COVID.  7. Overall guarded prognosis.

## 2020-04-03 NOTE — PROGRESS NOTE ADULT - SUBJECTIVE AND OBJECTIVE BOX
infectious diseases progress note:    CARLEE LAKE is a 59y y. o. Female patient    COVID Patient    Allergies    barbiturates (Unknown)  Colace (Unknown)  docusate (Unknown)  phenobarbital (Unknown)    Intolerances        ANTIBIOTICS/RELEVANT:  antimicrobials  hydroxychloroquine 400 milliGRAM(s) Oral every 12 hours  hydroxychloroquine   Oral     immunologic:    OTHER:  acetaminophen  Suppository .. 650 milliGRAM(s) Rectal every 4 hours PRN  acetaminophen  Suppository .. 650 milliGRAM(s) Rectal every 4 hours PRN  ALBUTerol    90 MICROgram(s) HFA Inhaler 2 Puff(s) Inhalation every 6 hours  apixaban 5 milliGRAM(s) Oral two times a day  ascorbic acid 500 milliGRAM(s) Oral two times a day  calcium carbonate 1250 mG  + Vitamin D (OsCal 500 + D) 1 Tablet(s) Oral daily  cholecalciferol 2000 Unit(s) Oral two times a day  lactulose Syrup 10 Gram(s) Oral three times a day  latanoprost 0.005% Ophthalmic Solution 1 Drop(s) Both EYES at bedtime  LORazepam   Injectable 0.5 milliGRAM(s) IV Push every 3 hours PRN  methylPREDNISolone sodium succinate Injectable 60 milliGRAM(s) IV Push daily  midodrine. 2.5 milliGRAM(s) Oral three times a day  montelukast 10 milliGRAM(s) Oral at bedtime  pantoprazole   Suspension 40 milliGRAM(s) Oral daily  senna 2 Tablet(s) Oral at bedtime  tiotropium 18 MICROgram(s) Capsule 1 Capsule(s) Inhalation daily  valproate sodium IVPB 250 milliGRAM(s) IV Intermittent three times a day  verapamil 80 milliGRAM(s) Oral two times a day      Objective:  Vital Signs Last 24 Hrs  T(C): 35.2 (03 Apr 2020 13:45), Max: 36.6 (03 Apr 2020 08:14)  T(F): 95.4 (03 Apr 2020 13:45), Max: 97.8 (03 Apr 2020 08:14)  HR: 150 (03 Apr 2020 15:00) (66 - 150)  BP: 140/79 (03 Apr 2020 15:00) (105/63 - 143/98)  BP(mean): 98 (03 Apr 2020 15:00) (93 - 98)  RR: 56 (03 Apr 2020 15:00) (19 - 56)  SpO2: 89% (03 Apr 2020 15:00) (89% - 98%)    T(C): 35.2 (04-03-20 @ 13:45), Max: 37.1 (04-01-20 @ 17:06)  T(C): 35.2 (04-03-20 @ 13:45), Max: 37.3 (04-01-20 @ 00:05)  T(C): 35.2 (04-03-20 @ 13:45), Max: 38.4 (03-30-20 @ 16:49)    PHYSICAL EXAM:  HEENT: NC atraumatic  Neck: supple  Respiratory: no accessory muscle use, breathing comfortably  Cardiovascular: distant  Gastrointestinal: normal appearing, nondistended  Extremities: no clubbing, no cyanosis,      LABS:                          13.5   12.73 )-----------( 387      ( 02 Apr 2020 07:18 )             41.8       12.73 04-02 @ 07:18  12.60 04-01 @ 09:01  11.90 03-31 @ 11:20  6.90 03-30 @ 14:26  6.05 03-29 @ 16:22  4.70 03-29 @ 00:27      04-02    146<H>  |  107  |  11  ----------------------------<  97  3.2<L>   |  33<H>  |  0.30<L>    Ca    8.7      02 Apr 2020 07:18    TPro  6.9  /  Alb  2.5<L>  /  TBili  0.4  /  DBili  x   /  AST  29  /  ALT  24  /  AlkPhos  67  04-02      Creatinine, Serum: 0.30 mg/dL (04-02-20 @ 07:18)  Creatinine, Serum: 0.39 mg/dL (04-01-20 @ 09:01)  Creatinine, Serum: 0.32 mg/dL (03-31-20 @ 11:19)  Creatinine, Serum: 0.41 mg/dL (03-30-20 @ 14:26)  Creatinine, Serum: 0.42 mg/dL (03-29-20 @ 16:22)  Creatinine, Serum: 0.65 mg/dL (03-29-20 @ 00:27)      PT/INR - ( 02 Apr 2020 07:18 )   PT: 15.0 sec;   INR: 1.33 ratio                   COVID RISK SCORE  Auto Neutrophil #: 10.41 K/uL (04-02-20 @ 07:18)  Auto Lymphocyte #: 1.10 K/uL (04-02-20 @ 07:18)  Auto Neutrophil #: 9.79 K/uL (04-01-20 @ 09:01)  Auto Lymphocyte #: 1.38 K/uL (04-01-20 @ 09:01)  Auto Neutrophil #: 9.29 K/uL (03-31-20 @ 11:20)  Auto Lymphocyte #: 1.11 K/uL (03-31-20 @ 11:20)  Auto Neutrophil #: 4.86 K/uL (03-30-20 @ 14:26)  Auto Lymphocyte #: 1.05 K/uL (03-30-20 @ 14:26)  Auto Neutrophil #: 3.72 K/uL (03-29-20 @ 16:22)  Auto Lymphocyte #: 1.30 K/uL (03-29-20 @ 16:22)  Auto Neutrophil #: 3.57 K/uL (03-29-20 @ 00:27)  Auto Lymphocyte #: 0.61 K/uL (03-29-20 @ 00:27)    Lactate, Blood: 1.3 mmol/L (03-29-20 @ 00:27)    Auto Eosinophil #: 0.00 K/uL (04-02-20 @ 07:18)  Auto Eosinophil #: 0.00 K/uL (04-01-20 @ 09:01)  Auto Eosinophil #: 0.00 K/uL (03-31-20 @ 11:20)  Auto Eosinophil #: 0.00 K/uL (03-30-20 @ 14:26)  Auto Eosinophil #: 0.01 K/uL (03-29-20 @ 16:22)  Auto Eosinophil #: 0.00 K/uL (03-29-20 @ 00:27)    Lactate Dehydrogenase, Serum: 478 U/L (04-01-20 @ 11:23)  Lactate Dehydrogenase, Serum: 496 U/L (03-31-20 @ 16:52)    Sedimentation Rate, Erythrocyte: 29 mm/hr (03-31-20 @ 13:46)    Procalcitonin, Serum: 0.06 ng/mL (03-31-20 @ 11:19)  Procalcitonin, Serum: 0.05 ng/mL (03-30-20 @ 14:26)  Procalcitonin, Serum: 0.11 ng/mL (03-29-20 @ 00:27)    Troponin I, Serum: <.015 ng/mL (04-02-20 @ 07:18)  Troponin I, Serum: <.015 ng/mL (03-31-20 @ 11:19)    Creatine Kinase, Serum: 45 U/L (04-02-20 @ 07:18)  Creatine Kinase, Serum: 65 U/L (04-01-20 @ 09:01)  Creatine Kinase, Serum: 115 U/L (03-31-20 @ 11:19)        Ferritin, Serum: 642 ng/mL (04-01-20 @ 11:37)  Ferritin, Serum: 595 ng/mL (03-31-20 @ 16:51)        INR: 1.33 ratio (04-02-20 @ 07:18)  INR: 1.15 ratio (04-01-20 @ 09:01)  INR: 1.35 ratio (03-31-20 @ 13:57)  Activated Partial Thromboplastin Time: 25.1 sec (03-29-20 @ 00:27)  INR: 1.11 ratio (03-29-20 @ 00:27)    D-Dimer Assay, Quantitative: 253 ng/mL DDU (03-30-20 @ 17:27)        MICROBIOLOGY:              RADIOLOGY & ADDITIONAL STUDIES:

## 2020-04-03 NOTE — CHART NOTE - NSCHARTNOTEFT_GEN_A_CORE
Called by RN for Pt with O2 sat in 70s    Seen and examined at bedside. Pt is MRDD and unable to obtain ROS        T(C): 36.6 (04-03-20 @ 08:14), Max: 36.6 (04-03-20 @ 08:14)  HR: 66 (04-03-20 @ 08:14) (66 - 104)  BP: 105/63 (04-03-20 @ 08:14) (105/63 - 143/98)  RR: 20 (04-03-20 @ 08:14) (19 - 20)  SpO2: 90% (04-03-20 @ 08:14) (90% - 98%)  Wt(kg): --    Physical :  Gen- NAD on NRB  Cardio - s+1,s+2, rrr, no murmur  Lung - dec BS L>R  Ext- b/l edema 1+    LABS:                        13.5   12.73 )-----------( 387      ( 02 Apr 2020 07:18 )             41.8     04-02    146<H>  |  107  |  11  ----------------------------<  97  3.2<L>   |  33<H>  |  0.30<L>    Ca    8.7      02 Apr 2020 07:18    TPro  6.9  /  Alb  2.5<L>  /  TBili  0.4  /  DBili  x   /  AST  29  /  ALT  24  /  AlkPhos  67  04-02    PT/INR - ( 02 Apr 2020 07:18 )   PT: 15.0 sec;   INR: 1.33 ratio             ABG - ( 03 Apr 2020 10:25 )  pH, Arterial: 7.33  pH, Blood: x     /  pCO2: 68    /  pO2: 50    / HCO3: 30    / Base Excess: 9.2   /  SaO2: 82                CARDIAC MARKERS ( 02 Apr 2020 07:18 )  <.015 ng/mL / x     / 45 U/L / x     / x            Assessment/Plan   59F with known PAF currently in atrial fibrillation in the setting of cerebral palsy, Mental retardation, COPD, PAD admitted COVID19+ with b/l PNA  - s/p Lasix earlier will obtain CXR stat to see if pt needs further diuresis   - ABG stat  - continue NRB and monitor respi status closely  - called placed to Dr Ad Moha      Addendum: ABG significant for respiratory acidosis with hypoxemia, ICU consulted. CXR shows white out of L lung. ICU will see patient.  Dr Calhoun  PGY3  [phone] Called by RN for Pt with O2 sat in 70s    Seen and examined at bedside. Pt is MRDD and unable to obtain ROS        T(C): 36.6 (04-03-20 @ 08:14), Max: 36.6 (04-03-20 @ 08:14)  HR: 66 (04-03-20 @ 08:14) (66 - 104)  BP: 105/63 (04-03-20 @ 08:14) (105/63 - 143/98)  RR: 20 (04-03-20 @ 08:14) (19 - 20)  SpO2: 90% (04-03-20 @ 08:14) (90% - 98%)  Wt(kg): --    Physical :  Gen- NAD on NRB  Cardio - s+1,s+2, rrr, no murmur  Lung - dec BS L>R  Ext- b/l edema 1+    LABS:                        13.5   12.73 )-----------( 387      ( 02 Apr 2020 07:18 )             41.8     04-02    146<H>  |  107  |  11  ----------------------------<  97  3.2<L>   |  33<H>  |  0.30<L>    Ca    8.7      02 Apr 2020 07:18    TPro  6.9  /  Alb  2.5<L>  /  TBili  0.4  /  DBili  x   /  AST  29  /  ALT  24  /  AlkPhos  67  04-02    PT/INR - ( 02 Apr 2020 07:18 )   PT: 15.0 sec;   INR: 1.33 ratio             ABG - ( 03 Apr 2020 10:25 )  pH, Arterial: 7.33  pH, Blood: x     /  pCO2: 68    /  pO2: 50    / HCO3: 30    / Base Excess: 9.2   /  SaO2: 82                CARDIAC MARKERS ( 02 Apr 2020 07:18 )  <.015 ng/mL / x     / 45 U/L / x     / x            Assessment/Plan   59F with known PAF currently in atrial fibrillation in the setting of cerebral palsy, Mental retardation, COPD, PAD admitted COVID19+ with b/l PNA  - s/p Lasix earlier will obtain CXR stat to see if pt needs further diuresis   - ABG stat  - continue NRB and monitor respi status closely  - called placed to Dr Ad Moha      Addendum: ABG significant for respiratory acidosis with hypoxemia, ICU consulted. CXR shows white out of L lung. ICU will see patient.    Addendum: Transfer patient to ICU for higher level of care and intubation.    Dr Calhoun  PGY3  [phone] Called by RN for Pt with O2 sat in 70s    Seen and examined at bedside. Pt is MRDD and unable to obtain ROS        T(C): 36.6 (04-03-20 @ 08:14), Max: 36.6 (04-03-20 @ 08:14)  HR: 66 (04-03-20 @ 08:14) (66 - 104)  BP: 105/63 (04-03-20 @ 08:14) (105/63 - 143/98)  RR: 20 (04-03-20 @ 08:14) (19 - 20)  SpO2: 90% (04-03-20 @ 08:14) (90% - 98%)  Wt(kg): --    Physical :  Gen- NAD on NRB  Cardio - s+1,s+2, rrr, no murmur  Lung - dec BS L>R  Ext- b/l edema 1+    LABS:                        13.5   12.73 )-----------( 387      ( 02 Apr 2020 07:18 )             41.8     04-02    146<H>  |  107  |  11  ----------------------------<  97  3.2<L>   |  33<H>  |  0.30<L>    Ca    8.7      02 Apr 2020 07:18    TPro  6.9  /  Alb  2.5<L>  /  TBili  0.4  /  DBili  x   /  AST  29  /  ALT  24  /  AlkPhos  67  04-02    PT/INR - ( 02 Apr 2020 07:18 )   PT: 15.0 sec;   INR: 1.33 ratio             ABG - ( 03 Apr 2020 10:25 )  pH, Arterial: 7.33  pH, Blood: x     /  pCO2: 68    /  pO2: 50    / HCO3: 30    / Base Excess: 9.2   /  SaO2: 82                CARDIAC MARKERS ( 02 Apr 2020 07:18 )  <.015 ng/mL / x     / 45 U/L / x     / x            Assessment/Plan   59F with known PAF currently in atrial fibrillation in the setting of cerebral palsy, Mental retardation, COPD, PAD admitted COVID19+ with b/l PNA  - s/p Lasix earlier will obtain CXR stat to see if pt needs further diuresis   - ABG stat  - continue NRB and monitor respi status closely  - called placed to Dr Ad Moha      Addendum: ABG significant for respiratory acidosis with hypoxemia, ICU consulted. CXR shows white out of L lung. ICU will see patient.    Addendum: Transfer patient to ICU for higher level of care and intubation. Spoke to Dr Escamilla she is aware    Dr Calhoun  PGY3  [phone]

## 2020-04-04 LAB
ALBUMIN SERPL ELPH-MCNC: 2.2 G/DL — LOW (ref 3.3–5)
ALP SERPL-CCNC: 58 U/L — SIGNIFICANT CHANGE UP (ref 40–120)
ALT FLD-CCNC: 23 U/L — SIGNIFICANT CHANGE UP (ref 12–78)
ANION GAP SERPL CALC-SCNC: 5 MMOL/L — SIGNIFICANT CHANGE UP (ref 5–17)
AST SERPL-CCNC: 32 U/L — SIGNIFICANT CHANGE UP (ref 15–37)
BASOPHILS # BLD AUTO: 0.04 K/UL — SIGNIFICANT CHANGE UP (ref 0–0.2)
BASOPHILS NFR BLD AUTO: 0.5 % — SIGNIFICANT CHANGE UP (ref 0–2)
BILIRUB SERPL-MCNC: 0.4 MG/DL — SIGNIFICANT CHANGE UP (ref 0.2–1.2)
BUN SERPL-MCNC: 23 MG/DL — SIGNIFICANT CHANGE UP (ref 7–23)
CALCIUM SERPL-MCNC: 9.5 MG/DL — SIGNIFICANT CHANGE UP (ref 8.5–10.1)
CHLORIDE SERPL-SCNC: 110 MMOL/L — HIGH (ref 96–108)
CO2 SERPL-SCNC: 38 MMOL/L — HIGH (ref 22–31)
CREAT SERPL-MCNC: 0.4 MG/DL — LOW (ref 0.5–1.3)
EOSINOPHIL # BLD AUTO: 0 K/UL — SIGNIFICANT CHANGE UP (ref 0–0.5)
EOSINOPHIL NFR BLD AUTO: 0 % — SIGNIFICANT CHANGE UP (ref 0–6)
GLUCOSE SERPL-MCNC: 125 MG/DL — HIGH (ref 70–99)
HCT VFR BLD CALC: 39.3 % — SIGNIFICANT CHANGE UP (ref 34.5–45)
HGB BLD-MCNC: 12.8 G/DL — SIGNIFICANT CHANGE UP (ref 11.5–15.5)
IMM GRANULOCYTES NFR BLD AUTO: 1.6 % — HIGH (ref 0–1.5)
LYMPHOCYTES # BLD AUTO: 0.95 K/UL — LOW (ref 1–3.3)
LYMPHOCYTES # BLD AUTO: 11.6 % — LOW (ref 13–44)
MCHC RBC-ENTMCNC: 32.1 PG — SIGNIFICANT CHANGE UP (ref 27–34)
MCHC RBC-ENTMCNC: 32.6 GM/DL — SIGNIFICANT CHANGE UP (ref 32–36)
MCV RBC AUTO: 98.5 FL — SIGNIFICANT CHANGE UP (ref 80–100)
MONOCYTES # BLD AUTO: 0.45 K/UL — SIGNIFICANT CHANGE UP (ref 0–0.9)
MONOCYTES NFR BLD AUTO: 5.5 % — SIGNIFICANT CHANGE UP (ref 2–14)
NEUTROPHILS # BLD AUTO: 6.65 K/UL — SIGNIFICANT CHANGE UP (ref 1.8–7.4)
NEUTROPHILS NFR BLD AUTO: 80.8 % — HIGH (ref 43–77)
NRBC # BLD: 0 /100 WBCS — SIGNIFICANT CHANGE UP (ref 0–0)
PLATELET # BLD AUTO: 414 K/UL — HIGH (ref 150–400)
POTASSIUM SERPL-MCNC: 3 MMOL/L — LOW (ref 3.5–5.3)
POTASSIUM SERPL-SCNC: 3 MMOL/L — LOW (ref 3.5–5.3)
PROT SERPL-MCNC: 6.2 G/DL — SIGNIFICANT CHANGE UP (ref 6–8.3)
RBC # BLD: 3.99 M/UL — SIGNIFICANT CHANGE UP (ref 3.8–5.2)
RBC # FLD: 14.9 % — HIGH (ref 10.3–14.5)
SODIUM SERPL-SCNC: 153 MMOL/L — HIGH (ref 135–145)
WBC # BLD: 8.22 K/UL — SIGNIFICANT CHANGE UP (ref 3.8–10.5)
WBC # FLD AUTO: 8.22 K/UL — SIGNIFICANT CHANGE UP (ref 3.8–10.5)

## 2020-04-04 PROCEDURE — 99291 CRITICAL CARE FIRST HOUR: CPT

## 2020-04-04 RX ORDER — POTASSIUM CHLORIDE 20 MEQ
40 PACKET (EA) ORAL EVERY 4 HOURS
Refills: 0 | Status: COMPLETED | OUTPATIENT
Start: 2020-04-04 | End: 2020-04-05

## 2020-04-04 RX ADMIN — SENNA PLUS 2 TABLET(S): 8.6 TABLET ORAL at 20:50

## 2020-04-04 RX ADMIN — Medication 40 MILLIEQUIVALENT(S): at 23:31

## 2020-04-04 RX ADMIN — Medication 100 MILLIGRAM(S): at 20:51

## 2020-04-04 RX ADMIN — PANTOPRAZOLE SODIUM 40 MILLIGRAM(S): 20 TABLET, DELAYED RELEASE ORAL at 13:58

## 2020-04-04 RX ADMIN — Medication 2000 UNIT(S): at 05:13

## 2020-04-04 RX ADMIN — APIXABAN 5 MILLIGRAM(S): 2.5 TABLET, FILM COATED ORAL at 17:32

## 2020-04-04 RX ADMIN — ALBUTEROL 2 PUFF(S): 90 AEROSOL, METERED ORAL at 09:21

## 2020-04-04 RX ADMIN — LACTULOSE 10 GRAM(S): 10 SOLUTION ORAL at 20:36

## 2020-04-04 RX ADMIN — Medication 200 MILLIGRAM(S): at 08:57

## 2020-04-04 RX ADMIN — MIDODRINE HYDROCHLORIDE 2.5 MILLIGRAM(S): 2.5 TABLET ORAL at 05:13

## 2020-04-04 RX ADMIN — Medication 500 MILLIGRAM(S): at 05:14

## 2020-04-04 RX ADMIN — Medication 2000 UNIT(S): at 17:34

## 2020-04-04 RX ADMIN — MIDODRINE HYDROCHLORIDE 2.5 MILLIGRAM(S): 2.5 TABLET ORAL at 17:32

## 2020-04-04 RX ADMIN — Medication 60 MILLIGRAM(S): at 05:15

## 2020-04-04 RX ADMIN — LATANOPROST 1 DROP(S): 0.05 SOLUTION/ DROPS OPHTHALMIC; TOPICAL at 00:10

## 2020-04-04 RX ADMIN — MONTELUKAST 10 MILLIGRAM(S): 4 TABLET, CHEWABLE ORAL at 20:48

## 2020-04-04 RX ADMIN — Medication 100 MILLIGRAM(S): at 05:12

## 2020-04-04 RX ADMIN — Medication 80 MILLIGRAM(S): at 05:13

## 2020-04-04 RX ADMIN — LATANOPROST 1 DROP(S): 0.05 SOLUTION/ DROPS OPHTHALMIC; TOPICAL at 20:38

## 2020-04-04 RX ADMIN — ALBUTEROL 2 PUFF(S): 90 AEROSOL, METERED ORAL at 18:50

## 2020-04-04 RX ADMIN — ALBUTEROL 2 PUFF(S): 90 AEROSOL, METERED ORAL at 12:53

## 2020-04-04 RX ADMIN — APIXABAN 5 MILLIGRAM(S): 2.5 TABLET, FILM COATED ORAL at 05:14

## 2020-04-04 RX ADMIN — Medication 100 MILLIGRAM(S): at 12:52

## 2020-04-04 RX ADMIN — Medication 500 MILLIGRAM(S): at 17:32

## 2020-04-04 RX ADMIN — Medication 40 MILLIEQUIVALENT(S): at 20:56

## 2020-04-04 RX ADMIN — Medication 1 TABLET(S): at 12:51

## 2020-04-04 RX ADMIN — LACTULOSE 10 GRAM(S): 10 SOLUTION ORAL at 12:52

## 2020-04-04 RX ADMIN — Medication 200 MILLIGRAM(S): at 20:35

## 2020-04-04 RX ADMIN — Medication 80 MILLIGRAM(S): at 17:32

## 2020-04-04 RX ADMIN — LACTULOSE 10 GRAM(S): 10 SOLUTION ORAL at 05:16

## 2020-04-04 RX ADMIN — MIDODRINE HYDROCHLORIDE 2.5 MILLIGRAM(S): 2.5 TABLET ORAL at 12:51

## 2020-04-04 RX ADMIN — TIOTROPIUM BROMIDE 1 CAPSULE(S): 18 CAPSULE ORAL; RESPIRATORY (INHALATION) at 09:22

## 2020-04-04 NOTE — ED ADULT NURSE REASSESSMENT NOTE - NS ED NURSE REASSESS COMMENT FT1
ICU PA and attending at bedside to assess pt status, made aware of pt worsening tachypnea. Also made aware of pt decreased potassium. No new orders at this time.

## 2020-04-04 NOTE — ED ADULT NURSE REASSESSMENT NOTE - NS ED NURSE REASSESS COMMENT FT1
Pt turned positioned, made comfortable in bed pt remains tachypneic, on non rebreather. Pt turned positioned, made comfortable in bed pt remains tachypneic, on non rebreather. family contacted via [email or web address] and updated on pt condition and POC.

## 2020-04-04 NOTE — ED ADULT NURSE REASSESSMENT NOTE - NS ED NURSE REASSESS COMMENT FT1
pt turned positioned, made comfortable in bed. chest pt performed. Pt sitting up in bed, awake alert at baseline mental status. on non rebreather, breathing even, unlabored. pt continues w/ wet unproductive cough. tachycardia pt in a-fib, normotensive. moving extremities, 5/5.

## 2020-04-04 NOTE — PROGRESS NOTE ADULT - SUBJECTIVE AND OBJECTIVE BOX
Patient is a 59y old  Female who presents with a chief complaint of covid19+  was sent for low oxygen (03 Apr 2020 15:17)    24 hour events: ***    REVIEW OF SYSTEMS  Constitutional: No fever, chills, fatigue  Neuro: No headache, numbness, weakness  Resp: No cough, wheezing, shortness of breath  CVS: No chest pain, palpitations, leg swelling  GI: No abdominal pain, nausea, vomiting, diarrhea   : No dysuria, frequency, incontinence  Skin: No itching, burning, rashes, or lesions   Msk: No joint pain or swelling  Psych: No depression, anxiety, mood swings  Heme: No bleeding    T(F): 97.9 (04-04-20 @ 04:00), Max: 98.4 (04-03-20 @ 21:41)  HR: 102 (04-04-20 @ 07:00) (66 - 159)  BP: 121/60 (04-04-20 @ 07:00) (105/63 - 142/79)  RR: 23 (04-04-20 @ 07:00) (20 - 56)  SpO2: 94% (04-04-20 @ 07:00) (85% - 96%)  Wt(kg): --            I&O's Summary    04-03 @ 07:01  -  04-04 @ 07:00  --------------------------------------------------------  IN: 50 mL / OUT: 460 mL / NET: -410 mL      PHYSICAL EXAM  General:   CNS:   HEENT:   Resp:   CVS:   Abd:   Ext:   Skin:     MEDICATIONS  hydroxychloroquine Oral  hydroxychloroquine Oral    midodrine. Oral  verapamil Oral    methylPREDNISolone sodium succinate Injectable IV Push    ALBUTerol    90 MICROgram(s) HFA Inhaler Inhalation  montelukast Oral  tiotropium 18 MICROgram(s) Capsule Inhalation    acetaminophen  Suppository .. Rectal PRN  acetaminophen  Suppository .. Rectal PRN  LORazepam   Injectable IV Push PRN  valproate sodium IVPB IV Intermittent      apixaban Oral    lactulose Syrup Oral  pantoprazole   Suspension Oral  senna Oral      ascorbic acid Oral  calcium carbonate 1250 mG  + Vitamin D (OsCal 500 + D) Oral  cholecalciferol Oral      latanoprost 0.005% Ophthalmic Solution Both EYES      Radiology: ***  Bedside lung ultrasound: ***  Bedside ECHO: ***    CENTRAL LINE: Y/N          DATE INSERTED:              REMOVE: Y/N  LORENZANA: Y/N                        DATE INSERTED:              REMOVE: Y/N  A-LINE: Y/N                       DATE INSERTED:              REMOVE: Y/N    GLOBAL ISSUE/BEST PRACTICE  Analgesia:   Sedation:   CAM-ICU:   HOB elevation: yes  Stress ulcer prophylaxis:   VTE prophylaxis:   Glycemic control:   Nutrition:     CODE STATUS: ***  GO discussion: Y Patient is a 59y old  Female who presents with a chief complaint of covid19+  was sent for low oxygen (03 Apr 2020 15:17)    24 hour events: Patient seen and examined. Events noted. Resting comfortably in bed. Current on NRB. Unable to provide meaningful information.     REVIEW OF SYSTEMS  Limited 2/2 comorbidities     T(F): 97.9 (04-04-20 @ 04:00), Max: 98.4 (04-03-20 @ 21:41)  HR: 102 (04-04-20 @ 07:00) (66 - 159)  BP: 121/60 (04-04-20 @ 07:00) (105/63 - 142/79)  RR: 23 (04-04-20 @ 07:00) (20 - 56)  SpO2: 94% (04-04-20 @ 07:00) (85% - 96%)  Wt(kg): --            I&O's Summary    04-03 @ 07:01  -  04-04 @ 07:00  --------------------------------------------------------  IN: 50 mL / OUT: 460 mL / NET: -410 mL      PHYSICAL EXAM  General: tachyneic on NRB  CNS: Awake  HEENT: MMM anicteric  Resp: Decreased breath sounds b/l. Coarse sounds  CVS: IRRR S1 and S2 no sig MRG  Abd: soft NT  lymph no sig edema  Skin: no visible rashes or ulcers.     MEDICATIONS  hydroxychloroquine Oral  hydroxychloroquine Oral    midodrine. Oral  verapamil Oral    methylPREDNISolone sodium succinate Injectable IV Push    ALBUTerol    90 MICROgram(s) HFA Inhaler Inhalation  montelukast Oral  tiotropium 18 MICROgram(s) Capsule Inhalation    acetaminophen  Suppository .. Rectal PRN  acetaminophen  Suppository .. Rectal PRN  LORazepam   Injectable IV Push PRN  valproate sodium IVPB IV Intermittent      apixaban Oral    lactulose Syrup Oral  pantoprazole   Suspension Oral  senna Oral      ascorbic acid Oral  calcium carbonate 1250 mG  + Vitamin D (OsCal 500 + D) Oral  cholecalciferol Oral      latanoprost 0.005% Ophthalmic Solution Both EYES      Radiology: ***  Bedside lung ultrasound: ***  Bedside ECHO: ***    CENTRAL LINE: Y/N          DATE INSERTED:              REMOVE: Y/N  LORENZANA: Y/N                        DATE INSERTED:              REMOVE: Y/N  A-LINE: Y/N                       DATE INSERTED:              REMOVE: Y/N    GLOBAL ISSUE/BEST PRACTICE  Analgesia:   Sedation:   CAM-ICU:   HOB elevation: yes  Stress ulcer prophylaxis:   VTE prophylaxis:   Glycemic control:   Nutrition:     CODE STATUS: ***  [facility] discussion: Y

## 2020-04-04 NOTE — PROGRESS NOTE ADULT - SUBJECTIVE AND OBJECTIVE BOX
[facility] Cardiology    CHIEF COMPLAINT: Patient is a 59y old  Female who presents with a chief complaint of covid19+  was sent for low oxygen (04 Apr 2020 08:08)      Follow Up: [ ] Chest Pain      [ ] Dyspnea     [ ] Palpitations    [ ] Atrial Fibrillation     [ ] Ventricular Dysrhythmia    [ ] Abnormal EKG                      [ ] Abnormal Cardiac Enzymes     [ ] Valvular Disease    HPI:  patient was tested +covid19 few days ago  was sent by house for low oxygen (29 Mar 2020 01:07)      PAST MEDICAL & SURGICAL HISTORY:  Osteoporosis  CP (cerebral palsy)  Scoliosis  Mentally challenged  Epilepsy  COPD (chronic obstructive pulmonary disease)  PVD (peripheral vascular disease)  Osteoporosis  Chest pain  Scoliosis  Seizure  MR (mental retardation), severe: immobilty sec to severe dissability  No significant past surgical history      MEDICATIONS  (STANDING):  ALBUTerol    90 MICROgram(s) HFA Inhaler 2 Puff(s) Inhalation every 6 hours  apixaban 5 milliGRAM(s) Oral two times a day  ascorbic acid 500 milliGRAM(s) Oral two times a day  calcium carbonate 1250 mG  + Vitamin D (OsCal 500 + D) 1 Tablet(s) Oral daily  cholecalciferol 2000 Unit(s) Oral two times a day  hydroxychloroquine   Oral   hydroxychloroquine 200 milliGRAM(s) Oral every 12 hours  lactulose Syrup 10 Gram(s) Oral three times a day  latanoprost 0.005% Ophthalmic Solution 1 Drop(s) Both EYES at bedtime  methylPREDNISolone sodium succinate Injectable 60 milliGRAM(s) IV Push daily  midodrine. 2.5 milliGRAM(s) Oral three times a day  montelukast 10 milliGRAM(s) Oral at bedtime  pantoprazole   Suspension 40 milliGRAM(s) Oral daily  senna 2 Tablet(s) Oral at bedtime  tiotropium 18 MICROgram(s) Capsule 1 Capsule(s) Inhalation daily  valproate sodium IVPB 250 milliGRAM(s) IV Intermittent three times a day  verapamil 80 milliGRAM(s) Oral two times a day    MEDICATIONS  (PRN):  acetaminophen  Suppository .. 650 milliGRAM(s) Rectal every 4 hours PRN Temp greater or equal to 38C (100.4F)  acetaminophen  Suppository .. 650 milliGRAM(s) Rectal every 4 hours PRN Mild Pain (1 - 3)  LORazepam   Injectable 0.5 milliGRAM(s) IV Push every 3 hours PRN acute seizures      Allergies    barbiturates (Unknown)  Colace (Unknown)  docusate (Unknown)  phenobarbital (Unknown)    Intolerances        REVIEW OF SYSTEMS:    CONSTITUTIONAL: No weakness, fevers or chills.   EYES/ENT: No visual changes;  No vertigo or throat pain   NECK: No pain or stiffness  RESPIRATORY: No cough, wheezing, hemoptysis; No shortness of breath  CARDIOVASCULAR: No chest pain or palpitations  GASTROINTESTINAL: No abdominal or epigastric pain. No nausea, vomiting, or hematemesis; No diarrhea or constipation. No melena or hematochezia.  GENITOURINARY: No dysuria, frequency or hematuria  NEUROLOGICAL: No numbness or weakness  SKIN: No itching, burning, rashes, or lesions   All other review of systems is negative unless indicated above    Vital Signs Last 24 Hrs  T(C): 36.6 (04 Apr 2020 09:25), Max: 36.9 (03 Apr 2020 21:41)  T(F): 97.9 (04 Apr 2020 09:25), Max: 98.4 (03 Apr 2020 21:41)  HR: 117 (04 Apr 2020 12:00) (96 - 159)  BP: 119/95 (04 Apr 2020 12:00) (118/79 - 142/79)  BP(mean): 94 (04 Apr 2020 00:00) (89 - 106)  RR: 22 (04 Apr 2020 12:00) (21 - 56)  SpO2: 96% (04 Apr 2020 12:00) (85% - 96%)    I&O's Summary    03 Apr 2020 07:01  -  04 Apr 2020 07:00  --------------------------------------------------------  IN: 50 mL / OUT: 460 mL / NET: -410 mL        PHYSICAL EXAM:    Constitutional: Minimal respiratory distress, awake and alert, well-developed  Eyes:  EOMI,  Pupils round, No oral cyanosis.  HEENT: No exudate or erythema  Pulmonary: Decreased breath sounds bilaterally  Cardiovascular: Tachycardic, S1 and S2, No murmurs,  Gastrointestinal: Bowel Sounds present, soft, nontender.   Ext: Trivial LE edema   Neurological: Alert, no gross focal motor deficits  Skin: No rashes.  Psych:  MRDD    LABS: All Labs Reviewed:          · Assessment		  59F with known PAF currently in atrial fibrillation in the setting of cerebral palsy, Mental retardation, COPD, PAD.  She has a CHADSVasc Score of 3 (Female, PAD, HTN).  Heart rates are better but significant hypoxia at 83% on non-rebreather.  ICU monitoring.    Suggest:  1. Continue Eliquis 5mg twice daily.   2. s/p IV Furosemide for possible acute on chronic diastolic HF  3. would repeat 12 lead ekg  4. Supportive care for COVID treatment  5. Continue with Verapamil 80 BID,  in the setting of COPD   6. Will follow as needed in the setting of COVID.  7. Overall guarded prognosis.

## 2020-04-04 NOTE — ED ADULT NURSE REASSESSMENT NOTE - NS ED NURSE REASSESS COMMENT FT1
pt received awake alert, on non rebreather, breathing even unlabored. pt moving head in bed otherwise contracted in b/l LE, w/ + 2 pitting edema. pt moving arms independently, at baseline mental status.

## 2020-04-04 NOTE — PROGRESS NOTE ADULT - ASSESSMENT
Imp:   Acute hypoxic resp failure   ARDS  COVID-19 pneumonia   A.fib with RVR  Hx seizure d/o and MR    Plan:  Neuro: Not sedated Continue valproate    Cards: BP improved. Cont midodrine. AF is rate controlled. Cont verapamil as tolerated. F/U Cards. Continue apixaban    Pulm: Continue 100% NRM + NC O2, SpO2 ~ 88-96%. Appears improved overall. Cont on steroids.     Renal: Now hypernatremic. Will need to increase free water. Monitor UO, place [place] for urinary retention     ID: COVID +. Not on systemic abx/ Continue Plaquenil     DVT ppx with apixaban   Prognosis overall is poor     Patient critically ill

## 2020-04-05 LAB
ALBUMIN SERPL ELPH-MCNC: 2.4 G/DL — LOW (ref 3.3–5)
ALP SERPL-CCNC: 56 U/L — SIGNIFICANT CHANGE UP (ref 40–120)
ALT FLD-CCNC: 22 U/L — SIGNIFICANT CHANGE UP (ref 12–78)
ANION GAP SERPL CALC-SCNC: 4 MMOL/L — LOW (ref 5–17)
APTT BLD: 73.1 SEC — HIGH (ref 28.5–37)
AST SERPL-CCNC: 29 U/L — SIGNIFICANT CHANGE UP (ref 15–37)
BASOPHILS # BLD AUTO: 0.03 K/UL — SIGNIFICANT CHANGE UP (ref 0–0.2)
BASOPHILS NFR BLD AUTO: 0.4 % — SIGNIFICANT CHANGE UP (ref 0–2)
BILIRUB SERPL-MCNC: 0.4 MG/DL — SIGNIFICANT CHANGE UP (ref 0.2–1.2)
BUN SERPL-MCNC: 26 MG/DL — HIGH (ref 7–23)
CALCIUM SERPL-MCNC: 10 MG/DL — SIGNIFICANT CHANGE UP (ref 8.5–10.1)
CHLORIDE SERPL-SCNC: 112 MMOL/L — HIGH (ref 96–108)
CO2 SERPL-SCNC: 40 MMOL/L — HIGH (ref 22–31)
CREAT SERPL-MCNC: 0.43 MG/DL — LOW (ref 0.5–1.3)
CRP SERPL-MCNC: 6.05 MG/DL — HIGH (ref 0–0.4)
D DIMER BLD IA.RAPID-MCNC: 161 NG/ML DDU — SIGNIFICANT CHANGE UP
EOSINOPHIL # BLD AUTO: 0 K/UL — SIGNIFICANT CHANGE UP (ref 0–0.5)
EOSINOPHIL NFR BLD AUTO: 0 % — SIGNIFICANT CHANGE UP (ref 0–6)
ERYTHROCYTE [SEDIMENTATION RATE] IN BLOOD: 29 MM/HR — HIGH (ref 0–20)
FERRITIN SERPL-MCNC: 993 NG/ML — HIGH (ref 15–150)
GLUCOSE SERPL-MCNC: 109 MG/DL — HIGH (ref 70–99)
HCT VFR BLD CALC: 40.3 % — SIGNIFICANT CHANGE UP (ref 34.5–45)
HGB BLD-MCNC: 12.8 G/DL — SIGNIFICANT CHANGE UP (ref 11.5–15.5)
IMM GRANULOCYTES NFR BLD AUTO: 1.8 % — HIGH (ref 0–1.5)
INR BLD: 1.41 RATIO — HIGH (ref 0.88–1.16)
LDH SERPL L TO P-CCNC: 796 U/L — HIGH (ref 50–242)
LYMPHOCYTES # BLD AUTO: 1.48 K/UL — SIGNIFICANT CHANGE UP (ref 1–3.3)
LYMPHOCYTES # BLD AUTO: 18.9 % — SIGNIFICANT CHANGE UP (ref 13–44)
MAGNESIUM SERPL-MCNC: 2.2 MG/DL — SIGNIFICANT CHANGE UP (ref 1.6–2.6)
MCHC RBC-ENTMCNC: 31.8 GM/DL — LOW (ref 32–36)
MCHC RBC-ENTMCNC: 32.3 PG — SIGNIFICANT CHANGE UP (ref 27–34)
MCV RBC AUTO: 101.8 FL — HIGH (ref 80–100)
MONOCYTES # BLD AUTO: 0.63 K/UL — SIGNIFICANT CHANGE UP (ref 0–0.9)
MONOCYTES NFR BLD AUTO: 8 % — SIGNIFICANT CHANGE UP (ref 2–14)
NEUTROPHILS # BLD AUTO: 5.56 K/UL — SIGNIFICANT CHANGE UP (ref 1.8–7.4)
NEUTROPHILS NFR BLD AUTO: 70.9 % — SIGNIFICANT CHANGE UP (ref 43–77)
NRBC # BLD: 0 /100 WBCS — SIGNIFICANT CHANGE UP (ref 0–0)
PHOSPHATE SERPL-MCNC: 3.2 MG/DL — SIGNIFICANT CHANGE UP (ref 2.5–4.5)
PLATELET # BLD AUTO: 508 K/UL — HIGH (ref 150–400)
POTASSIUM SERPL-MCNC: 3.5 MMOL/L — SIGNIFICANT CHANGE UP (ref 3.5–5.3)
POTASSIUM SERPL-SCNC: 3.5 MMOL/L — SIGNIFICANT CHANGE UP (ref 3.5–5.3)
PROCALCITONIN SERPL-MCNC: <0.05 — SIGNIFICANT CHANGE UP (ref 0–0.04)
PROT SERPL-MCNC: 6.3 G/DL — SIGNIFICANT CHANGE UP (ref 6–8.3)
PROTHROM AB SERPL-ACNC: 16 SEC — HIGH (ref 10–12.9)
RBC # BLD: 3.96 M/UL — SIGNIFICANT CHANGE UP (ref 3.8–5.2)
RBC # FLD: 15.4 % — HIGH (ref 10.3–14.5)
SODIUM SERPL-SCNC: 156 MMOL/L — HIGH (ref 135–145)
TROPONIN I SERPL-MCNC: <.015 NG/ML — SIGNIFICANT CHANGE UP (ref 0.01–0.04)
WBC # BLD: 7.84 K/UL — SIGNIFICANT CHANGE UP (ref 3.8–10.5)
WBC # FLD AUTO: 7.84 K/UL — SIGNIFICANT CHANGE UP (ref 3.8–10.5)

## 2020-04-05 PROCEDURE — 99291 CRITICAL CARE FIRST HOUR: CPT

## 2020-04-05 RX ORDER — DILTIAZEM HCL 120 MG
30 CAPSULE, EXT RELEASE 24 HR ORAL
Refills: 0 | Status: DISCONTINUED | OUTPATIENT
Start: 2020-04-05 | End: 2020-04-09

## 2020-04-05 RX ORDER — DILTIAZEM HCL 120 MG
5 CAPSULE, EXT RELEASE 24 HR ORAL
Qty: 125 | Refills: 0 | Status: DISCONTINUED | OUTPATIENT
Start: 2020-04-05 | End: 2020-04-07

## 2020-04-05 RX ORDER — DIGOXIN 250 MCG
0.12 TABLET ORAL DAILY
Refills: 0 | Status: DISCONTINUED | OUTPATIENT
Start: 2020-04-05 | End: 2020-04-09

## 2020-04-05 RX ORDER — SODIUM CHLORIDE 9 MG/ML
1000 INJECTION, SOLUTION INTRAVENOUS
Refills: 0 | Status: DISCONTINUED | OUTPATIENT
Start: 2020-04-05 | End: 2020-04-09

## 2020-04-05 RX ORDER — DEXMEDETOMIDINE HYDROCHLORIDE IN 0.9% SODIUM CHLORIDE 4 UG/ML
0.3 INJECTION INTRAVENOUS
Qty: 200 | Refills: 0 | Status: DISCONTINUED | OUTPATIENT
Start: 2020-04-05 | End: 2020-04-06

## 2020-04-05 RX ADMIN — LACTULOSE 10 GRAM(S): 10 SOLUTION ORAL at 13:31

## 2020-04-05 RX ADMIN — Medication 30 MILLIGRAM(S): at 13:16

## 2020-04-05 RX ADMIN — MIDODRINE HYDROCHLORIDE 2.5 MILLIGRAM(S): 2.5 TABLET ORAL at 13:10

## 2020-04-05 RX ADMIN — Medication 1000 UNIT(S): at 05:16

## 2020-04-05 RX ADMIN — APIXABAN 5 MILLIGRAM(S): 2.5 TABLET, FILM COATED ORAL at 05:10

## 2020-04-05 RX ADMIN — ALBUTEROL 2 PUFF(S): 90 AEROSOL, METERED ORAL at 21:03

## 2020-04-05 RX ADMIN — Medication 5 MG/HR: at 10:42

## 2020-04-05 RX ADMIN — SODIUM CHLORIDE 50 MILLILITER(S): 9 INJECTION, SOLUTION INTRAVENOUS at 21:02

## 2020-04-05 RX ADMIN — Medication 500 MILLIGRAM(S): at 17:12

## 2020-04-05 RX ADMIN — Medication 0.12 MILLIGRAM(S): at 21:46

## 2020-04-05 RX ADMIN — MONTELUKAST 10 MILLIGRAM(S): 4 TABLET, CHEWABLE ORAL at 21:47

## 2020-04-05 RX ADMIN — LACTULOSE 10 GRAM(S): 10 SOLUTION ORAL at 21:46

## 2020-04-05 RX ADMIN — MIDODRINE HYDROCHLORIDE 2.5 MILLIGRAM(S): 2.5 TABLET ORAL at 05:21

## 2020-04-05 RX ADMIN — Medication 100 MILLIGRAM(S): at 21:45

## 2020-04-05 RX ADMIN — PANTOPRAZOLE SODIUM 40 MILLIGRAM(S): 20 TABLET, DELAYED RELEASE ORAL at 13:09

## 2020-04-05 RX ADMIN — TIOTROPIUM BROMIDE 1 CAPSULE(S): 18 CAPSULE ORAL; RESPIRATORY (INHALATION) at 07:43

## 2020-04-05 RX ADMIN — Medication 30 MILLIGRAM(S): at 17:11

## 2020-04-05 RX ADMIN — DEXMEDETOMIDINE HYDROCHLORIDE IN 0.9% SODIUM CHLORIDE 4.13 MICROGRAM(S)/KG/HR: 4 INJECTION INTRAVENOUS at 21:39

## 2020-04-05 RX ADMIN — Medication 30 MILLIGRAM(S): at 22:33

## 2020-04-05 RX ADMIN — Medication 1 TABLET(S): at 13:33

## 2020-04-05 RX ADMIN — Medication 200 MILLIGRAM(S): at 21:46

## 2020-04-05 RX ADMIN — LACTULOSE 10 GRAM(S): 10 SOLUTION ORAL at 05:12

## 2020-04-05 RX ADMIN — SENNA PLUS 2 TABLET(S): 8.6 TABLET ORAL at 21:46

## 2020-04-05 RX ADMIN — Medication 80 MILLIGRAM(S): at 05:20

## 2020-04-05 RX ADMIN — LATANOPROST 1 DROP(S): 0.05 SOLUTION/ DROPS OPHTHALMIC; TOPICAL at 21:47

## 2020-04-05 RX ADMIN — Medication 2000 UNIT(S): at 17:12

## 2020-04-05 RX ADMIN — MIDODRINE HYDROCHLORIDE 2.5 MILLIGRAM(S): 2.5 TABLET ORAL at 17:11

## 2020-04-05 RX ADMIN — Medication 500 MILLIGRAM(S): at 05:10

## 2020-04-05 RX ADMIN — APIXABAN 5 MILLIGRAM(S): 2.5 TABLET, FILM COATED ORAL at 17:11

## 2020-04-05 RX ADMIN — ALBUTEROL 2 PUFF(S): 90 AEROSOL, METERED ORAL at 13:18

## 2020-04-05 RX ADMIN — Medication 100 MILLIGRAM(S): at 13:30

## 2020-04-05 RX ADMIN — Medication 200 MILLIGRAM(S): at 09:16

## 2020-04-05 RX ADMIN — Medication 100 MILLIGRAM(S): at 05:23

## 2020-04-05 RX ADMIN — ALBUTEROL 2 PUFF(S): 90 AEROSOL, METERED ORAL at 07:58

## 2020-04-05 RX ADMIN — Medication 60 MILLIGRAM(S): at 05:19

## 2020-04-05 RX ADMIN — Medication 40 MILLIEQUIVALENT(S): at 00:24

## 2020-04-05 NOTE — PROGRESS NOTE ADULT - PROBLEM SELECTOR PLAN 1
sp respiratory failure/sepsis  extubated  continue iv steroids/plaquenil sp respiratory failure/sepsis  extubated  continue iv steroids/plaquenil  overall prognosis grim  will most likely require reintubation  I will speak to mom/dad  patient lacks capacity to make decisions

## 2020-04-05 NOTE — PROGRESS NOTE ADULT - SUBJECTIVE AND OBJECTIVE BOX
PAST MEDICAL & SURGICAL HISTORY:  Osteoporosis  CP (cerebral palsy)  Scoliosis  Mentally challenged  Epilepsy  COPD (chronic obstructive pulmonary disease)  PVD (peripheral vascular disease)  Osteoporosis  Chest pain  Scoliosis  Seizure  MR (mental retardation), severe: immobilty sec to severe dissability  No significant past surgical history      MEDICATIONS  (STANDING):  ALBUTerol    90 MICROgram(s) HFA Inhaler 2 Puff(s) Inhalation every 6 hours  apixaban 5 milliGRAM(s) Oral two times a day  ascorbic acid 500 milliGRAM(s) Oral two times a day  calcium carbonate 1250 mG  + Vitamin D (OsCal 500 + D) 1 Tablet(s) Oral daily  cholecalciferol 2000 Unit(s) Oral two times a day  diltiazem    Tablet 30 milliGRAM(s) Oral four times a day  diltiazem Infusion 5 mG/Hr (5 mL/Hr) IV Continuous <Continuous>  hydroxychloroquine   Oral   hydroxychloroquine 200 milliGRAM(s) Oral every 12 hours  lactulose Syrup 10 Gram(s) Oral three times a day  latanoprost 0.005% Ophthalmic Solution 1 Drop(s) Both EYES at bedtime  methylPREDNISolone sodium succinate Injectable 60 milliGRAM(s) IV Push daily  midodrine. 2.5 milliGRAM(s) Oral three times a day  montelukast 10 milliGRAM(s) Oral at bedtime  pantoprazole   Suspension 40 milliGRAM(s) Oral daily  senna 2 Tablet(s) Oral at bedtime  tiotropium 18 MICROgram(s) Capsule 1 Capsule(s) Inhalation daily  valproate sodium IVPB 250 milliGRAM(s) IV Intermittent three times a day    MEDICATIONS  (PRN):  acetaminophen  Suppository .. 650 milliGRAM(s) Rectal every 4 hours PRN Temp greater or equal to 38C (100.4F)  acetaminophen  Suppository .. 650 milliGRAM(s) Rectal every 4 hours PRN Mild Pain (1 - 3)  LORazepam   Injectable 0.5 milliGRAM(s) IV Push every 3 hours PRN acute seizures      Patient is a 59y old  Female who presents with a chief complaint of covid19+  was sent for low oxygen (05 Apr 2020 08:08)      Vital Signs Last 24 Hrs  T(C): 36.2 (05 Apr 2020 14:00), Max: 36.9 (04 Apr 2020 23:00)  T(F): 97.2 (05 Apr 2020 14:00), Max: 98.5 (05 Apr 2020 08:00)  HR: 117 (05 Apr 2020 14:00) (61 - 158)  BP: 136/80 (05 Apr 2020 14:00) (93/66 - 138/90)  BP(mean): --  RR: 22 (05 Apr 2020 14:00) (6 - 44)  SpO2: 92% (05 Apr 2020 14:00) (85% - 100%)          04-04 @ 07:01  -  04-05 @ 07:00  --------------------------------------------------------  IN: 120 mL / OUT: 600 mL / NET: -480 mL    04-05 @ 07:01  -  04-05 @ 16:11  --------------------------------------------------------  IN: 200 mL / OUT: 0 mL / NET: 200 mL        REVIEW OF SYSTEMS:    Constitutional: No fever, weight loss or fatigue  Eyes: No eye pain, visual disturbances, or discharge  ENT:  No difficulty hearing, tinnitus, vertigo; No sinus or throat pain  Neck: No pain or stiffness  Breasts: No pain, masses or nipple discharge  Respiratory: No cough, wheezing, chills or hemoptysis  Cardiovascular: No chest pain, palpitations, shortness of breath, dizziness or leg swelling  Gastrointestinal: No abdominal or epigastric pain. No nausea, vomiting or hematemesis; No diarrhea or constipation. No melena or hematochezia.  Genitourinary: No dysuria, frequency, hematuria or incontinence  Rectal: No pain, hemorrhoids or incontinence  Neurological: No headaches, memory loss, loss of strength, numbness or tremors  Skin: No itching, burning, rashes or lesions   Lymph Nodes: No enlarged glands  Endocrine: No heat or cold intolerance; No hair loss  Musculoskeletal: No joint pain or swelling; No muscle, back or extremity pain  Psychiatric: No depression, anxiety, mood swings or difficulty sleeping  Heme/Lymph: No easy bruising or bleeding gums  Allergy and Immunologic: No hives or eczema    PHYSICAL EXAM:    Constitutional: NAD, well-groomed, well-developed  HEENT: PERRLA, EOMI, Normal Hearing, MMM  Neck: No LAD, No JVD  Back: Normal spine flexure, No CVA tenderness  Respiratory: CTAB/L   Cardiovascular: S1 and S2, RRR, no M/G/R  Gastrointestinal: BS+, soft, NT/ND  Extremities: No peripheral edema  Vascular: 2+ peripheral pulses  Neurological: A/O x 3, no focal deficits  Skin: No rashes      decubiti:                           12.8   7.84  )-----------( 508      ( 05 Apr 2020 07:07 )             40.3     04-05    156<H>  |  112<H>  |  26<H>  ----------------------------<  109<H>  3.5   |  40<H>  |  0.43<L>    Ca    10.0      05 Apr 2020 07:07  Phos  3.2     04-05  Mg     2.2     04-05    TPro  6.3  /  Alb  2.4<L>  /  TBili  0.4  /  DBili  x   /  AST  29  /  ALT  22  /  AlkPhos  56  04-05    PT/INR - ( 05 Apr 2020 07:07 )   PT: 16.0 sec;   INR: 1.41 ratio         PTT - ( 05 Apr 2020 07:07 )  PTT:73.1 sec    CARDIAC MARKERS ( 05 Apr 2020 07:07 )  <.015 ng/mL / x     / x     / x     / x          04-05 @ 07:07    TSH: --  B12:  --   Folate: --   KEV: --   Rheumatoid: --   Ammonia:  --   CPK:  --  Total PSA:  --  Free PSA: --  Cortisol: --  C-Diff:  --  BNP:  --  SPEP: --  Troponin:  <.015  CKMB: --  Valproic acid level:  --  tegretol level: --  dilantin level:  --  phenobarb level: --  keppra level:  --            Radiology: PAST MEDICAL & SURGICAL HISTORY:  Osteoporosis  CP (cerebral palsy)  Scoliosis  Mentally challenged  Epilepsy  COPD (chronic obstructive pulmonary disease)  PVD (peripheral vascular disease)  Osteoporosis  Chest pain  Scoliosis  Seizure  MR (mental retardation), severe: immobilty sec to severe dissability  No significant past surgical history      MEDICATIONS  (STANDING):  ALBUTerol    90 MICROgram(s) HFA Inhaler 2 Puff(s) Inhalation every 6 hours  apixaban 5 milliGRAM(s) Oral two times a day  ascorbic acid 500 milliGRAM(s) Oral two times a day  calcium carbonate 1250 mG  + Vitamin D (OsCal 500 + D) 1 Tablet(s) Oral daily  cholecalciferol 2000 Unit(s) Oral two times a day  diltiazem    Tablet 30 milliGRAM(s) Oral four times a day  diltiazem Infusion 5 mG/Hr (5 mL/Hr) IV Continuous <Continuous>  hydroxychloroquine   Oral   hydroxychloroquine 200 milliGRAM(s) Oral every 12 hours  lactulose Syrup 10 Gram(s) Oral three times a day  latanoprost 0.005% Ophthalmic Solution 1 Drop(s) Both EYES at bedtime  methylPREDNISolone sodium succinate Injectable 60 milliGRAM(s) IV Push daily  midodrine. 2.5 milliGRAM(s) Oral three times a day  montelukast 10 milliGRAM(s) Oral at bedtime  pantoprazole   Suspension 40 milliGRAM(s) Oral daily  senna 2 Tablet(s) Oral at bedtime  tiotropium 18 MICROgram(s) Capsule 1 Capsule(s) Inhalation daily  valproate sodium IVPB 250 milliGRAM(s) IV Intermittent three times a day    MEDICATIONS  (PRN):  acetaminophen  Suppository .. 650 milliGRAM(s) Rectal every 4 hours PRN Temp greater or equal to 38C (100.4F)  acetaminophen  Suppository .. 650 milliGRAM(s) Rectal every 4 hours PRN Mild Pain (1 - 3)  LORazepam   Injectable 0.5 milliGRAM(s) IV Push every 3 hours PRN acute seizures      Patient is a 59y old  Female who presents with a chief complaint of covid19+  was sent for low oxygen (05 Apr 2020 08:08)      Vital Signs Last 24 Hrs  T(C): 36.2 (05 Apr 2020 14:00), Max: 36.9 (04 Apr 2020 23:00)  T(F): 97.2 (05 Apr 2020 14:00), Max: 98.5 (05 Apr 2020 08:00)  HR: 117 (05 Apr 2020 14:00) (61 - 158)  BP: 136/80 (05 Apr 2020 14:00) (93/66 - 138/90)  BP(mean): --  RR: 22 (05 Apr 2020 14:00) (6 - 44)  SpO2: 92% (05 Apr 2020 14:00) (85% - 100%)          04-04 @ 07:01  -  04-05 @ 07:00  --------------------------------------------------------  IN: 120 mL / OUT: 600 mL / NET: -480 mL    04-05 @ 07:01  -  04-05 @ 16:11  --------------------------------------------------------  IN: 200 mL / OUT: 0 mL / NET: 200 mL        REVIEW OF SYSTEMS:    Constitutional: No fever, weight loss or fatigue  Eyes: No eye pain, visual disturbances, or discharge  ENT:  No difficulty hearing, tinnitus, vertigo; No sinus or throat pain  Neck: No pain or stiffness  Breasts: No pain, masses or nipple discharge  Respiratory: No cough, wheezing, chills or hemoptysis  Cardiovascular: No chest pain, palpitations, shortness of breath, dizziness or leg swelling  Gastrointestinal: No abdominal or epigastric pain. No nausea, vomiting or hematemesis; No diarrhea or constipation. No melena or hematochezia.  Genitourinary: No dysuria, frequency, hematuria or incontinence  Rectal: No pain, hemorrhoids or incontinence  Neurological: No headaches, memory loss, loss of strength, numbness or tremors  Skin: No itching, burning, rashes or lesions   Lymph Nodes: No enlarged glands  Endocrine: No heat or cold intolerance; No hair loss  Musculoskeletal: No joint pain or swelling; No muscle, back or extremity pain  Psychiatric: No depression, anxiety, mood swings or difficulty sleeping  Heme/Lymph: No easy bruising or bleeding gums  Allergy and Immunologic: No hives or eczema    PHYSICAL EXAM:responds to stimuli  extubated  s/p rapid afib on diltiazem drip  Respiratory: clear upper lobes   Cardiovascular: irreg  Gastrointestinal: BS+, soft, NT/ND  Extremities: No peripheral edema  Neurological: A no focal deficits  Skin: No rashes      decubiti: none                          12.8   7.84  )-----------( 508      ( 05 Apr 2020 07:07 )             40.3     04-05    156<H>  |  112<H>  |  26<H>  ----------------------------<  109<H>  3.5   |  40<H>  |  0.43<L>    Ca    10.0      05 Apr 2020 07:07  Phos  3.2     04-05  Mg     2.2     04-05    TPro  6.3  /  Alb  2.4<L>  /  TBili  0.4  /  DBili  x   /  AST  29  /  ALT  22  /  AlkPhos  56  04-05    PT/INR - ( 05 Apr 2020 07:07 )   PT: 16.0 sec;   INR: 1.41 ratio         PTT - ( 05 Apr 2020 07:07 )  PTT:73.1 sec    CARDIAC MARKERS ( 05 Apr 2020 07:07 )  <.015 ng/mL / x     / x     / x     / x          04-05 @ 07:07    Troponin:  <.015        Radiology: PAST MEDICAL & SURGICAL HISTORY:  Osteoporosis  CP (cerebral palsy)  Scoliosi  profound mental retardation lacks capacity to make decisions  Epilepsy  COPD (chronic obstructive pulmonary disease)  PVD (peripheral vascular disease)  Osteoporosis  Chest pain  Scoliosis  Seizure  MR (mental retardation), severe: immobilty sec to severe dissability  No significant past surgical history      MEDICATIONS  (STANDING):  ALBUTerol    90 MICROgram(s) HFA Inhaler 2 Puff(s) Inhalation every 6 hours  apixaban 5 milliGRAM(s) Oral two times a day  ascorbic acid 500 milliGRAM(s) Oral two times a day  calcium carbonate 1250 mG  + Vitamin D (OsCal 500 + D) 1 Tablet(s) Oral daily  cholecalciferol 2000 Unit(s) Oral two times a day  diltiazem    Tablet 30 milliGRAM(s) Oral four times a day  diltiazem Infusion 5 mG/Hr (5 mL/Hr) IV Continuous <Continuous>  hydroxychloroquine   Oral   hydroxychloroquine 200 milliGRAM(s) Oral every 12 hours  lactulose Syrup 10 Gram(s) Oral three times a day  latanoprost 0.005% Ophthalmic Solution 1 Drop(s) Both EYES at bedtime  methylPREDNISolone sodium succinate Injectable 60 milliGRAM(s) IV Push daily  midodrine. 2.5 milliGRAM(s) Oral three times a day  montelukast 10 milliGRAM(s) Oral at bedtime  pantoprazole   Suspension 40 milliGRAM(s) Oral daily  senna 2 Tablet(s) Oral at bedtime  tiotropium 18 MICROgram(s) Capsule 1 Capsule(s) Inhalation daily  valproate sodium IVPB 250 milliGRAM(s) IV Intermittent three times a day    MEDICATIONS  (PRN):  acetaminophen  Suppository .. 650 milliGRAM(s) Rectal every 4 hours PRN Temp greater or equal to 38C (100.4F)  acetaminophen  Suppository .. 650 milliGRAM(s) Rectal every 4 hours PRN Mild Pain (1 - 3)  LORazepam   Injectable 0.5 milliGRAM(s) IV Push every 3 hours PRN acute seizures      Patient is a 59y old  Female who presents with a chief complaint of covid19+  was sent for low oxygen (05 Apr 2020 08:08)      Vital Signs Last 24 Hrs  T(C): 36.2 (05 Apr 2020 14:00), Max: 36.9 (04 Apr 2020 23:00)  T(F): 97.2 (05 Apr 2020 14:00), Max: 98.5 (05 Apr 2020 08:00)  HR: 117 (05 Apr 2020 14:00) (61 - 158)  BP: 136/80 (05 Apr 2020 14:00) (93/66 - 138/90)  BP(mean): --  RR: 22 (05 Apr 2020 14:00) (6 - 44)  SpO2: 92% (05 Apr 2020 14:00) (85% - 100%)          04-04 @ 07:01  -  04-05 @ 07:00  --------------------------------------------------------  IN: 120 mL / OUT: 600 mL / NET: -480 mL    04-05 @ 07:01  -  04-05 @ 16:11  --------------------------------------------------------  IN: 200 mL / OUT: 0 mL / NET: 200 mL        REVIEW OF SYSTEMS:    Constitutional: No fever, weight loss or fatigue  Eyes: No eye pain, visual disturbances, or discharge  ENT:  No difficulty hearing, tinnitus, vertigo; No sinus or throat pain  Neck: No pain or stiffness  Breasts: No pain, masses or nipple discharge  Respiratory: No cough, wheezing, chills or hemoptysis  Cardiovascular: No chest pain, palpitations, shortness of breath, dizziness or leg swelling  Gastrointestinal: No abdominal or epigastric pain. No nausea, vomiting or hematemesis; No diarrhea or constipation. No melena or hematochezia.  Genitourinary: No dysuria, frequency, hematuria or incontinence  Rectal: No pain, hemorrhoids or incontinence  Neurological: No headaches, memory loss, loss of strength, numbness or tremors  Skin: No itching, burning, rashes or lesions   Lymph Nodes: No enlarged glands  Endocrine: No heat or cold intolerance; No hair loss  Musculoskeletal: No joint pain or swelling; No muscle, back or extremity pain  Psychiatric: No depression, anxiety, mood swings or difficulty sleeping  Heme/Lymph: No easy bruising or bleeding gums  Allergy and Immunologic: No hives or eczema    profoundly challenged  lacks capacity to make decisions  PHYSICAL EXAM:responds to stimuli  extubated  s/p rapid afib on diltiazem drip  Respiratory: dec breathsounds   Cardiovascular: irreg  Gastrointestinal: BS+, soft, NT/ND  Extremities: No peripheral edema  Neurological: A no focal deficits  Skin: No rashes      decubiti: none                          12.8   7.84  )-----------( 508      ( 05 Apr 2020 07:07 )             40.3     04-05    156<H>  |  112<H>  |  26<H>  ----------------------------<  109<H>  3.5   |  40<H>  |  0.43<L>    Ca    10.0      05 Apr 2020 07:07  Phos  3.2     04-05  Mg     2.2     04-05    TPro  6.3  /  Alb  2.4<L>  /  TBili  0.4  /  DBili  x   /  AST  29  /  ALT  22  /  AlkPhos  56  04-05    PT/INR - ( 05 Apr 2020 07:07 )   PT: 16.0 sec;   INR: 1.41 ratio         PTT - ( 05 Apr 2020 07:07 )  PTT:73.1 sec    CARDIAC MARKERS ( 05 Apr 2020 07:07 )  <.015 ng/mL / x     / x     / x     / x          04-05 @ 07:07    Troponin:  <.015        Radiology:

## 2020-04-05 NOTE — CHART NOTE - NSCHARTNOTEFT_GEN_A_CORE
Assessment:   Pt seen as ICU follow-up. GOC discussed with pt's mother, pt full code (4/4). On NRB. Attempted to contact unit. Receiving puree diet with honey-thickened liquids. Total feed. BM not documented. On bowel regimen.    Factors impacting intake: [ ] none [ ] nausea  [ ] vomiting [ ] diarrhea [ ] constipation  [ x ]chewing problems [ x ] swallowing issues  [ ] other:     Diet Presciption: Diet, Dysphagia 1 Pureed-Honey Consistency Fluid (03-29-20 @ 01:20)    Intake: Per last documented intake, 50% PO noted in flowsheets    Current Weight: Weight (kg): 55 (04-03 @ 13:45)    Pertinent Medications: MEDICATIONS  (STANDING):  ALBUTerol    90 MICROgram(s) HFA Inhaler 2 Puff(s) Inhalation every 6 hours  apixaban 5 milliGRAM(s) Oral two times a day  ascorbic acid 500 milliGRAM(s) Oral two times a day  calcium carbonate 1250 mG  + Vitamin D (OsCal 500 + D) 1 Tablet(s) Oral daily  cholecalciferol 2000 Unit(s) Oral two times a day  diltiazem    Tablet 30 milliGRAM(s) Oral four times a day  diltiazem Infusion 5 mG/Hr (5 mL/Hr) IV Continuous <Continuous>  hydroxychloroquine   Oral   hydroxychloroquine 200 milliGRAM(s) Oral every 12 hours  lactulose Syrup 10 Gram(s) Oral three times a day  latanoprost 0.005% Ophthalmic Solution 1 Drop(s) Both EYES at bedtime  methylPREDNISolone sodium succinate Injectable 60 milliGRAM(s) IV Push daily  midodrine. 2.5 milliGRAM(s) Oral three times a day  montelukast 10 milliGRAM(s) Oral at bedtime  pantoprazole   Suspension 40 milliGRAM(s) Oral daily  senna 2 Tablet(s) Oral at bedtime  tiotropium 18 MICROgram(s) Capsule 1 Capsule(s) Inhalation daily  valproate sodium IVPB 250 milliGRAM(s) IV Intermittent three times a day    MEDICATIONS  (PRN):  acetaminophen  Suppository .. 650 milliGRAM(s) Rectal every 4 hours PRN Temp greater or equal to 38C (100.4F)  acetaminophen  Suppository .. 650 milliGRAM(s) Rectal every 4 hours PRN Mild Pain (1 - 3)  LORazepam   Injectable 0.5 milliGRAM(s) IV Push every 3 hours PRN acute seizures    Pertinent Labs: 04-05 Na156 mmol/L<H> Glu 109 mg/dL<H> K+ 3.5 mmol/L Cr  0.43 mg/dL<L> BUN 26 mg/dL<H> 04-05 Phos 3.2 mg/dL 04-05 Alb 2.4 g/dL<L>     CAPILLARY BLOOD GLUCOSE      POCT Blood Glucose.: 160 mg/dL (05 Apr 2020 07:15)    Skin: 1+ R foot. No pressure injuries.    Estimated Needs:   [ x ] no change since previous assessment  [ ] recalculated:     Previous Nutrition Diagnosis:   [ ] Inadequate Energy Intake [ ]Inadequate Oral Intake [ ] Excessive Energy Intake   [ ] Underweight [ x ] Increased Nutrient Needs [ ] Overweight/Obesity   [ ] Altered GI Function [ ] Unintended Weight Loss [ ] Food & Nutrition Related Knowledge Deficit [ ] Malnutrition     Nutrition Diagnosis is [ x ] ongoing, being addressed with PO diet    New Nutrition Diagnosis: [ x ] not applicable       Interventions:   Recommend  [ ] Change Diet To:  [ ] Nutrition Supplement  [ ] Nutrition Support  [ x ] Other:   1) Continue pureed diet with honey-thickened liquids.  2) Encourage adequate intake as tolerated. Provide total feeding assistance. Monitor need for supplement if pt with suboptimal intake.      Monitoring and Evaluation:   [ x ] PO intake [ x ] Tolerance to diet prescription [ x ] weights [ x ] labs[ x ] follow up per protocol  [ ] other:

## 2020-04-05 NOTE — PROGRESS NOTE ADULT - SUBJECTIVE AND OBJECTIVE BOX
Patient is a 59y old  Female who presents with a chief complaint of covid19+  was sent for low oxygen (03 Apr 2020 15:17)    24 hour events: Patient seen and examined. Events noted. Resting comfortably in bed. Current on NRB. Unable to provide meaningful information.     REVIEW OF SYSTEMS  Limited 2/2 comorbidities     T(F): 97.9 (04-04-20 @ 04:00), Max: 98.4 (04-03-20 @ 21:41)  HR: 102 (04-04-20 @ 07:00) (66 - 159)  BP: 121/60 (04-04-20 @ 07:00) (105/63 - 142/79)  RR: 23 (04-04-20 @ 07:00) (20 - 56)  SpO2: 94% (04-04-20 @ 07:00) (85% - 96%)  Wt(kg): --            I&O's Summary    04-03 @ 07:01  -  04-04 @ 07:00  --------------------------------------------------------  IN: 50 mL / OUT: 460 mL / NET: -410 mL      PHYSICAL EXAM  General: tachyneic on NRB  CNS: Awake  HEENT: MMM anicteric  Resp: Decreased breath sounds b/l. Coarse sounds  CVS: IRRR S1 and S2 no sig MRG  Abd: soft NT  lymph no sig edema  Skin: no visible rashes or ulcers.     MEDICATIONS  hydroxychloroquine Oral  hydroxychloroquine Oral    midodrine. Oral  verapamil Oral    methylPREDNISolone sodium succinate Injectable IV Push    ALBUTerol    90 MICROgram(s) HFA Inhaler Inhalation  montelukast Oral  tiotropium 18 MICROgram(s) Capsule Inhalation    acetaminophen  Suppository .. Rectal PRN  acetaminophen  Suppository .. Rectal PRN  LORazepam   Injectable IV Push PRN  valproate sodium IVPB IV Intermittent      apixaban Oral    lactulose Syrup Oral  pantoprazole   Suspension Oral  senna Oral      ascorbic acid Oral  calcium carbonate 1250 mG  + Vitamin D (OsCal 500 + D) Oral  cholecalciferol Oral      latanoprost 0.005% Ophthalmic Solution Both EYES      Radiology: ***  Bedside lung ultrasound: ***  Bedside ECHO: ***    CENTRAL LINE: Y/N          DATE INSERTED:              REMOVE: Y/N  LORENZANA: Y/N                        DATE INSERTED:              REMOVE: Y/N  A-LINE: Y/N                       DATE INSERTED:              REMOVE: Y/N    GLOBAL ISSUE/BEST PRACTICE  Analgesia:   Sedation:   CAM-ICU:   HOB elevation: yes  Stress ulcer prophylaxis:   VTE prophylaxis:   Glycemic control:   Nutrition:     CODE STATUS: ***  [facility] discussion: Y Patient is a 59y old  Female who presents with a chief complaint of covid19+  was sent for low oxygen (03 Apr 2020 15:17)    24 hour events: Patient seen and examined. Events noted. Resting comfortably in bed. Current on NRB. Unable to provide meaningful information.  She has now converted to rapid atrial fibrillation.    REVIEW OF SYSTEMS  Limited 2/2 comorbidities     T(F): 97.9 (04-04-20 @ 04:00), Max: 98.4 (04-03-20 @ 21:41)  HR: 102 (04-04-20 @ 07:00) (66 - 159)  BP: 121/60 (04-04-20 @ 07:00) (105/63 - 142/79)  RR: 23 (04-04-20 @ 07:00) (20 - 56)  SpO2: 94% (04-04-20 @ 07:00) (85% - 96%)  Wt(kg): --            I&O's Summary    04-03 @ 07:01  -  04-04 @ 07:00  --------------------------------------------------------  IN: 50 mL / OUT: 460 mL / NET: -410 mL      PHYSICAL EXAM  General: tachyneic on NRB  CNS: Awake  HEENT: MMM anicteric  Resp: Decreased breath sounds b/l. Coarse sounds  CVS: IRRR S1 and S2 no sig MRG  Abd: soft NT  lymph no sig edema  Skin: no visible rashes or ulcers.     MEDICATIONS  hydroxychloroquine Oral  hydroxychloroquine Oral    midodrine. Oral  verapamil Oral    methylPREDNISolone sodium succinate Injectable IV Push    ALBUTerol    90 MICROgram(s) HFA Inhaler Inhalation  montelukast Oral  tiotropium 18 MICROgram(s) Capsule Inhalation    acetaminophen  Suppository .. Rectal PRN  acetaminophen  Suppository .. Rectal PRN  LORazepam   Injectable IV Push PRN  valproate sodium IVPB IV Intermittent      apixaban Oral    lactulose Syrup Oral  pantoprazole   Suspension Oral  senna Oral      ascorbic acid Oral  calcium carbonate 1250 mG  + Vitamin D (OsCal 500 + D) Oral  cholecalciferol Oral      latanoprost 0.005% Ophthalmic Solution Both EYES      Radiology: ***  Bedside lung ultrasound: ***  Bedside ECHO: ***    CENTRAL LINE: Y/N          DATE INSERTED:              REMOVE: Y/N  LORENZANA: Y/N                        DATE INSERTED:              REMOVE: Y/N  A-LINE: Y/N                       DATE INSERTED:              REMOVE: Y/N    GLOBAL ISSUE/BEST PRACTICE  Analgesia:   Sedation:   CAM-ICU:   HOB elevation: yes  Stress ulcer prophylaxis:   VTE prophylaxis:   Glycemic control:   Nutrition:     CODE STATUS: ***  [facility] discussion: Y

## 2020-04-05 NOTE — PROGRESS NOTE ADULT - ASSESSMENT
Imp:   Acute hypoxic resp failure   ARDS  COVID-19 pneumonia   A.fib with RVR  Hx seizure d/o and MR    Plan:  Neuro: Not sedated Continue valproate    Cards: BP improved. Cont midodrine. AF is rate controlled. Cont verapamil as tolerated. F/U Cards. Continue apixaban    Pulm: Continue 100% NRM + NC O2, SpO2 ~ 88-96%. Appears improved overall. Cont on steroids.     Renal: Now hypernatremic. Will need to increase free water. Monitor UO, place Aponte for urinary retention     ID: COVID +. Not on systemic abx/ Continue Plaquenil     DVT ppx with apixaban   Prognosis overall is poor     Patient critically ill Imp:   Acute hypoxic resp failure   ARDS  COVID-19 pneumonia   A.fib with RVR  Hx seizure d/o and MR    Plan:  Neuro: Not sedated Continue valproate    Cards: BP improved. Cont midodrine.     - AF is rapid this AM.  Starting her on a diltiazem gtt at 5 mg/hr.  D/c verapamil.  Start diltiazem 30 PO q6h Continue apixaban    Pulm: Continue 100% NRM + NC O2, SpO2 ~ 88-96%. Appears improved overall. Cont on steroids for a total treatment duration of five days    Renal: Now hypernatremic. Will need to increase free water. Monitor UO, place Aponte for urinary retention     ID: COVID +. Not on systemic abx/ Continue Plaquenil     DVT ppx with apixaban   Prognosis overall is poor     Patient critically ill Imp:   Acute hypoxic resp failure   ARDS  COVID-19 pneumonia   A.fib with RVR  Hx seizure d/o and MR    Plan:  Neuro: Not sedated Continue valproate    Cards: BP improved. Cont midodrine.     - AF is rapid this AM.  Starting her on a diltiazem gtt at 5 mg/hr.  D/c verapamil.  Start diltiazem 30 PO q6h Continue apixaban    Pulm: Continue 100% NRM + NC O2, SpO2 ~ 88-96%. Appears improved overall. Cont on steroids for a total treatment duration of five days    Renal: Now hypernatremic. Will need to increase free water. Monitor UO, place Aponte for urinary retention     ID: COVID +. Not on systemic abx/ Continue Plaquenil     DVT ppx with apixaban   Prognosis overall is poor     Patient critically ill. Discussed prognosis and status updates with Mother Ada Mosley via phone [phone]. Patient is FULL CODE.

## 2020-04-05 NOTE — PROGRESS NOTE ADULT - PROBLEM SELECTOR PLAN 2
long hx  cardiology consulted  restart low dose verapamil  with low dose midodrine to keep bp elevated
rapid afib due to steroids  on iv diltiazem  add digoxin

## 2020-04-06 LAB
ALBUMIN SERPL ELPH-MCNC: 2.2 G/DL — LOW (ref 3.3–5)
ALP SERPL-CCNC: 49 U/L — SIGNIFICANT CHANGE UP (ref 40–120)
ALT FLD-CCNC: 21 U/L — SIGNIFICANT CHANGE UP (ref 12–78)
ANION GAP SERPL CALC-SCNC: 4 MMOL/L — LOW (ref 5–17)
APTT BLD: 62.2 SEC — HIGH (ref 28.5–37)
AST SERPL-CCNC: 30 U/L — SIGNIFICANT CHANGE UP (ref 15–37)
BASOPHILS # BLD AUTO: 0.03 K/UL — SIGNIFICANT CHANGE UP (ref 0–0.2)
BASOPHILS NFR BLD AUTO: 0.4 % — SIGNIFICANT CHANGE UP (ref 0–2)
BILIRUB SERPL-MCNC: 0.4 MG/DL — SIGNIFICANT CHANGE UP (ref 0.2–1.2)
BUN SERPL-MCNC: 25 MG/DL — HIGH (ref 7–23)
CALCIUM SERPL-MCNC: 9.2 MG/DL — SIGNIFICANT CHANGE UP (ref 8.5–10.1)
CHLORIDE SERPL-SCNC: 110 MMOL/L — HIGH (ref 96–108)
CO2 SERPL-SCNC: 40 MMOL/L — HIGH (ref 22–31)
CREAT SERPL-MCNC: 0.35 MG/DL — LOW (ref 0.5–1.3)
CRP SERPL-MCNC: 3.09 MG/DL — HIGH (ref 0–0.4)
D DIMER BLD IA.RAPID-MCNC: 273 NG/ML DDU — HIGH
EOSINOPHIL # BLD AUTO: 0 K/UL — SIGNIFICANT CHANGE UP (ref 0–0.5)
EOSINOPHIL NFR BLD AUTO: 0 % — SIGNIFICANT CHANGE UP (ref 0–6)
FERRITIN SERPL-MCNC: 863 NG/ML — HIGH (ref 15–150)
GLUCOSE SERPL-MCNC: 204 MG/DL — HIGH (ref 70–99)
HCT VFR BLD CALC: 34.7 % — SIGNIFICANT CHANGE UP (ref 34.5–45)
HGB BLD-MCNC: 11 G/DL — LOW (ref 11.5–15.5)
IMM GRANULOCYTES NFR BLD AUTO: 1.5 % — SIGNIFICANT CHANGE UP (ref 0–1.5)
INR BLD: 1.5 RATIO — HIGH (ref 0.88–1.16)
LYMPHOCYTES # BLD AUTO: 0.99 K/UL — LOW (ref 1–3.3)
LYMPHOCYTES # BLD AUTO: 13.3 % — SIGNIFICANT CHANGE UP (ref 13–44)
MAGNESIUM SERPL-MCNC: 2 MG/DL — SIGNIFICANT CHANGE UP (ref 1.6–2.6)
MCHC RBC-ENTMCNC: 31.7 GM/DL — LOW (ref 32–36)
MCHC RBC-ENTMCNC: 31.7 PG — SIGNIFICANT CHANGE UP (ref 27–34)
MCV RBC AUTO: 100 FL — SIGNIFICANT CHANGE UP (ref 80–100)
MONOCYTES # BLD AUTO: 0.49 K/UL — SIGNIFICANT CHANGE UP (ref 0–0.9)
MONOCYTES NFR BLD AUTO: 6.6 % — SIGNIFICANT CHANGE UP (ref 2–14)
NEUTROPHILS # BLD AUTO: 5.81 K/UL — SIGNIFICANT CHANGE UP (ref 1.8–7.4)
NEUTROPHILS NFR BLD AUTO: 78.2 % — HIGH (ref 43–77)
NRBC # BLD: 0 /100 WBCS — SIGNIFICANT CHANGE UP (ref 0–0)
PHOSPHATE SERPL-MCNC: 2.7 MG/DL — SIGNIFICANT CHANGE UP (ref 2.5–4.5)
PLATELET # BLD AUTO: 343 K/UL — SIGNIFICANT CHANGE UP (ref 150–400)
POTASSIUM SERPL-MCNC: 2.9 MMOL/L — CRITICAL LOW (ref 3.5–5.3)
POTASSIUM SERPL-SCNC: 2.9 MMOL/L — CRITICAL LOW (ref 3.5–5.3)
PROT SERPL-MCNC: 5.6 G/DL — LOW (ref 6–8.3)
PROTHROM AB SERPL-ACNC: 17 SEC — HIGH (ref 10–12.9)
RBC # BLD: 3.47 M/UL — LOW (ref 3.8–5.2)
RBC # FLD: 15.4 % — HIGH (ref 10.3–14.5)
SODIUM SERPL-SCNC: 154 MMOL/L — HIGH (ref 135–145)
TROPONIN I SERPL-MCNC: <.015 NG/ML — SIGNIFICANT CHANGE UP (ref 0.01–0.04)
WBC # BLD: 7.43 K/UL — SIGNIFICANT CHANGE UP (ref 3.8–10.5)
WBC # FLD AUTO: 7.43 K/UL — SIGNIFICANT CHANGE UP (ref 3.8–10.5)

## 2020-04-06 PROCEDURE — 99291 CRITICAL CARE FIRST HOUR: CPT

## 2020-04-06 PROCEDURE — 99292 CRITICAL CARE ADDL 30 MIN: CPT

## 2020-04-06 RX ORDER — POTASSIUM CHLORIDE 20 MEQ
10 PACKET (EA) ORAL
Refills: 0 | Status: COMPLETED | OUTPATIENT
Start: 2020-04-06 | End: 2020-04-06

## 2020-04-06 RX ORDER — MORPHINE SULFATE 50 MG/1
2 CAPSULE, EXTENDED RELEASE ORAL EVERY 6 HOURS
Refills: 0 | Status: DISCONTINUED | OUTPATIENT
Start: 2020-04-06 | End: 2020-04-09

## 2020-04-06 RX ORDER — MIDODRINE HYDROCHLORIDE 2.5 MG/1
5 TABLET ORAL THREE TIMES A DAY
Refills: 0 | Status: DISCONTINUED | OUTPATIENT
Start: 2020-04-06 | End: 2020-04-09

## 2020-04-06 RX ORDER — POTASSIUM CHLORIDE 20 MEQ
10 PACKET (EA) ORAL EVERY 4 HOURS
Refills: 0 | Status: COMPLETED | OUTPATIENT
Start: 2020-04-06 | End: 2020-04-06

## 2020-04-06 RX ADMIN — MONTELUKAST 10 MILLIGRAM(S): 4 TABLET, CHEWABLE ORAL at 23:52

## 2020-04-06 RX ADMIN — Medication 100 MILLIEQUIVALENT(S): at 13:20

## 2020-04-06 RX ADMIN — MIDODRINE HYDROCHLORIDE 2.5 MILLIGRAM(S): 2.5 TABLET ORAL at 04:33

## 2020-04-06 RX ADMIN — Medication 60 MILLIGRAM(S): at 04:32

## 2020-04-06 RX ADMIN — Medication 2000 UNIT(S): at 04:33

## 2020-04-06 RX ADMIN — Medication 100 MILLIEQUIVALENT(S): at 15:25

## 2020-04-06 RX ADMIN — Medication 500 MILLIGRAM(S): at 04:32

## 2020-04-06 RX ADMIN — LACTULOSE 10 GRAM(S): 10 SOLUTION ORAL at 04:32

## 2020-04-06 RX ADMIN — LATANOPROST 1 DROP(S): 0.05 SOLUTION/ DROPS OPHTHALMIC; TOPICAL at 23:29

## 2020-04-06 RX ADMIN — MORPHINE SULFATE 2 MILLIGRAM(S): 50 CAPSULE, EXTENDED RELEASE ORAL at 23:42

## 2020-04-06 RX ADMIN — DEXMEDETOMIDINE HYDROCHLORIDE IN 0.9% SODIUM CHLORIDE 4.13 MICROGRAM(S)/KG/HR: 4 INJECTION INTRAVENOUS at 16:06

## 2020-04-06 RX ADMIN — Medication 100 MILLIGRAM(S): at 23:24

## 2020-04-06 RX ADMIN — SODIUM CHLORIDE 50 MILLILITER(S): 9 INJECTION, SOLUTION INTRAVENOUS at 16:32

## 2020-04-06 RX ADMIN — Medication 30 MILLIGRAM(S): at 04:32

## 2020-04-06 RX ADMIN — DEXMEDETOMIDINE HYDROCHLORIDE IN 0.9% SODIUM CHLORIDE 4.13 MICROGRAM(S)/KG/HR: 4 INJECTION INTRAVENOUS at 17:05

## 2020-04-06 RX ADMIN — ALBUTEROL 2 PUFF(S): 90 AEROSOL, METERED ORAL at 15:28

## 2020-04-06 RX ADMIN — Medication 100 MILLIEQUIVALENT(S): at 11:36

## 2020-04-06 RX ADMIN — Medication 100 MILLIEQUIVALENT(S): at 22:08

## 2020-04-06 RX ADMIN — Medication 200 MILLIGRAM(S): at 09:59

## 2020-04-06 RX ADMIN — Medication 100 MILLIGRAM(S): at 04:25

## 2020-04-06 RX ADMIN — Medication 100 MILLIGRAM(S): at 14:24

## 2020-04-06 RX ADMIN — APIXABAN 5 MILLIGRAM(S): 2.5 TABLET, FILM COATED ORAL at 04:33

## 2020-04-06 RX ADMIN — DEXMEDETOMIDINE HYDROCHLORIDE IN 0.9% SODIUM CHLORIDE 4.13 MICROGRAM(S)/KG/HR: 4 INJECTION INTRAVENOUS at 12:11

## 2020-04-06 RX ADMIN — Medication 100 MILLIEQUIVALENT(S): at 20:36

## 2020-04-06 NOTE — PROGRESS NOTE ADULT - SUBJECTIVE AND OBJECTIVE BOX
Patient is a 59y old  Female who presents with a chief complaint of covid19+  was sent for low oxygen (03 Apr 2020 15:17)    Interval Events:   Patient seen and examined. Resting comfortably in bed. Current on NRB; satting 84-97%.  Unable to provide meaningful information.    Review of Systems:  Limited 2/2 comorbidities     T(F): 97.5 (04-06-20 @ 08:00), Max: 97.9 (04-05-20 @ 16:08)  HR: 51 (04-06-20 @ 08:00) (51 - 158)  BP: 98/64 (04-06-20 @ 08:00) (89/55 - 138/90)  RR: 25 (04-06-20 @ 08:00) (12 - 46)  SpO2: 84% (04-06-20 @ 08:00) (80% - 98%)          CAPILLARY BLOOD GLUCOSE      POCT Blood Glucose.: 160 mg/dL (05 Apr 2020 07:15)    I&O's Summary    05 Apr 2020 07:01  -  06 Apr 2020 07:00  --------------------------------------------------------  IN: 1025.5 mL / OUT: 720 mL / NET: 305.5 mL        Physical Exam:   Gen:  Neuro:  HEENT:  CV:  Pulm:  GI:  Ext:  Skin:    Meds:  apixaban 5 milliGRAM(s) Oral two times a day    hydroxychloroquine   Oral   hydroxychloroquine 200 milliGRAM(s) Oral every 12 hours    digoxin     Tablet 0.125 milliGRAM(s) Oral daily  diltiazem    Tablet 30 milliGRAM(s) Oral four times a day  diltiazem Infusion 5 mG/Hr IV Continuous <Continuous>  midodrine. 2.5 milliGRAM(s) Oral three times a day    methylPREDNISolone sodium succinate Injectable 60 milliGRAM(s) IV Push daily    ALBUTerol    90 MICROgram(s) HFA Inhaler 2 Puff(s) Inhalation every 6 hours  montelukast 10 milliGRAM(s) Oral at bedtime  tiotropium 18 MICROgram(s) Capsule 1 Capsule(s) Inhalation daily    acetaminophen  Suppository .. 650 milliGRAM(s) Rectal every 4 hours PRN  acetaminophen  Suppository .. 650 milliGRAM(s) Rectal every 4 hours PRN  dexMEDEtomidine Infusion 0.3 MICROgram(s)/kG/Hr IV Continuous <Continuous>  valproate sodium IVPB 250 milliGRAM(s) IV Intermittent three times a day    lactulose Syrup 10 Gram(s) Oral three times a day  pantoprazole   Suspension 40 milliGRAM(s) Oral daily  senna 2 Tablet(s) Oral at bedtime        ascorbic acid 500 milliGRAM(s) Oral two times a day  calcium carbonate 1250 mG  + Vitamin D (OsCal 500 + D) 1 Tablet(s) Oral daily  cholecalciferol 2000 Unit(s) Oral two times a day  dextrose 5%. 1000 milliLiter(s) IV Continuous <Continuous>      latanoprost 0.005% Ophthalmic Solution 1 Drop(s) Both EYES at bedtime                                  11.0   7.43  )-----------( 343      ( 06 Apr 2020 06:47 )             34.7       04-06    154<H>  |  110<H>  |  25<H>  ----------------------------<  204<H>  2.9<LL>   |  40<H>  |  0.35<L>    Ca    9.2      06 Apr 2020 06:47  Phos  2.7     04-06  Mg     2.0     04-06    TPro  5.6<L>  /  Alb  2.2<L>  /  TBili  0.4  /  DBili  x   /  AST  30  /  ALT  21  /  AlkPhos  49  04-06      CARDIAC MARKERS ( 06 Apr 2020 06:47 )  <.015 ng/mL / x     / x     / x     / x      CARDIAC MARKERS ( 05 Apr 2020 07:07 )  <.015 ng/mL / x     / x     / x     / x          PT/INR - ( 06 Apr 2020 06:47 )   PT: 17.0 sec;   INR: 1.50 ratio         PTT - ( 06 Apr 2020 06:47 )  PTT:62.2 sec              CENTRAL LINE: N       LORENZANA: Y       A-LINE: N         GLOBAL ISSUE/BEST PRACTICE:  Analgesia:   Sedation:   CAM-ICU:   HOB elevation: Y  Stress ulcer prophylaxis:   VTE prophylaxis:   Glycemic control:   Nutrition:     CODE STATUS: Full code Patient is a 59y old  Female who presents with a chief complaint of covid19+  was sent for low oxygen (03 Apr 2020 15:17)    Interval Events:   Patient seen and examined. Resting comfortably in bed. Current on NRB; satting 84-97%.  Unable to provide meaningful information.    Review of Systems:  Limited 2/2 comorbidities     T(F): 97.5 (04-06-20 @ 08:00), Max: 97.9 (04-05-20 @ 16:08)  HR: 51 (04-06-20 @ 08:00) (51 - 158)  BP: 98/64 (04-06-20 @ 08:00) (89/55 - 138/90)  RR: 25 (04-06-20 @ 08:00) (12 - 46)  SpO2: 84% (04-06-20 @ 08:00) (80% - 98%)  CAPILLARY BLOOD GLUCOSE      POCT Blood Glucose.: 160 mg/dL (05 Apr 2020 07:15)    I&O's Summary    05 Apr 2020 07:01  -  06 Apr 2020 07:00  --------------------------------------------------------  IN: 1025.5 mL / OUT: 720 mL / NET: 305.5 mL        Physical Exam:   Gen:  Neuro: Cerebral palsy MRDD  HEENT: Atraumatic  CV: S1, S2 RRR  Pulm: CTA  GI: Soft, non-tender, non-distended  Ext: Contracted  1+ pitting edema    Meds:  apixaban 5 milliGRAM(s) Oral two times a day    hydroxychloroquine   Oral   hydroxychloroquine 200 milliGRAM(s) Oral every 12 hours    digoxin     Tablet 0.125 milliGRAM(s) Oral daily  diltiazem    Tablet 30 milliGRAM(s) Oral four times a day  diltiazem Infusion 5 mG/Hr IV Continuous <Continuous>  midodrine. 2.5 milliGRAM(s) Oral three times a day    methylPREDNISolone sodium succinate Injectable 60 milliGRAM(s) IV Push daily    ALBUTerol    90 MICROgram(s) HFA Inhaler 2 Puff(s) Inhalation every 6 hours  montelukast 10 milliGRAM(s) Oral at bedtime  tiotropium 18 MICROgram(s) Capsule 1 Capsule(s) Inhalation daily    acetaminophen  Suppository .. 650 milliGRAM(s) Rectal every 4 hours PRN  acetaminophen  Suppository .. 650 milliGRAM(s) Rectal every 4 hours PRN  dexMEDEtomidine Infusion 0.3 MICROgram(s)/kG/Hr IV Continuous <Continuous>  valproate sodium IVPB 250 milliGRAM(s) IV Intermittent three times a day    lactulose Syrup 10 Gram(s) Oral three times a day  pantoprazole   Suspension 40 milliGRAM(s) Oral daily  senna 2 Tablet(s) Oral at bedtime        ascorbic acid 500 milliGRAM(s) Oral two times a day  calcium carbonate 1250 mG  + Vitamin D (OsCal 500 + D) 1 Tablet(s) Oral daily  cholecalciferol 2000 Unit(s) Oral two times a day  dextrose 5%. 1000 milliLiter(s) IV Continuous <Continuous>      latanoprost 0.005% Ophthalmic Solution 1 Drop(s) Both EYES at bedtime                          11.0   7.43  )-----------( 343      ( 06 Apr 2020 06:47 )             34.7       04-06    154<H>  |  110<H>  |  25<H>  ----------------------------<  204<H>  2.9<LL>   |  40<H>  |  0.35<L>    Ca    9.2      06 Apr 2020 06:47  Phos  2.7     04-06  Mg     2.0     04-06    TPro  5.6<L>  /  Alb  2.2<L>  /  TBili  0.4  /  DBili  x   /  AST  30  /  ALT  21  /  AlkPhos  49  04-06      CARDIAC MARKERS ( 06 Apr 2020 06:47 )  <.015 ng/mL / x     / x     / x     / x      CARDIAC MARKERS ( 05 Apr 2020 07:07 )  <.015 ng/mL / x     / x     / x     / x          PT/INR - ( 06 Apr 2020 06:47 )   PT: 17.0 sec;   INR: 1.50 ratio         PTT - ( 06 Apr 2020 06:47 )  PTT:62.2 sec    CENTRAL LINE: N       LORENZANA: Y       A-LINE: N         GLOBAL ISSUE/BEST PRACTICE:  Analgesia:   Sedation:   CAM-ICU:   HOB elevation: Y  Stress ulcer prophylaxis:   VTE prophylaxis:   Glycemic control:   Nutrition:     CODE STATUS: Full code Patient is a 59y old  Female who presents with a chief complaint of covid19+  was sent for low oxygen (03 Apr 2020 15:17)    Interval Events:   Patient seen and examined. Resting comfortably in bed. Current on NRB; satting 84-97%.  Unable to provide meaningful information.    Review of Systems:  Limited 2/2 comorbidities     T(F): 97.5 (04-06-20 @ 08:00), Max: 97.9 (04-05-20 @ 16:08)  HR: 51 (04-06-20 @ 08:00) (51 - 158)  BP: 98/64 (04-06-20 @ 08:00) (89/55 - 138/90)  RR: 25 (04-06-20 @ 08:00) (12 - 46)  SpO2: 84% (04-06-20 @ 08:00) (80% - 98%)  CAPILLARY BLOOD GLUCOSE      POCT Blood Glucose.: 160 mg/dL (05 Apr 2020 07:15)    I&O's Summary    05 Apr 2020 07:01  -  06 Apr 2020 07:00  --------------------------------------------------------  IN: 1025.5 mL / OUT: 720 mL / NET: 305.5 mL        Physical Exam:   Gen:  Neuro: Cerebral palsy MRDD  HEENT: Atraumatic  CV: S1, S2 RRR  Pulm: CTA  GI: Soft, non-tender, non-distended  Ext: Contracted  1+ pitting edema    Meds:  apixaban 5 milliGRAM(s) Oral two times a day    hydroxychloroquine   Oral   hydroxychloroquine 200 milliGRAM(s) Oral every 12 hours    digoxin     Tablet 0.125 milliGRAM(s) Oral daily  diltiazem    Tablet 30 milliGRAM(s) Oral four times a day  diltiazem Infusion 5 mG/Hr IV Continuous <Continuous>  midodrine. 2.5 milliGRAM(s) Oral three times a day    methylPREDNISolone sodium succinate Injectable 60 milliGRAM(s) IV Push daily    ALBUTerol    90 MICROgram(s) HFA Inhaler 2 Puff(s) Inhalation every 6 hours  montelukast 10 milliGRAM(s) Oral at bedtime  tiotropium 18 MICROgram(s) Capsule 1 Capsule(s) Inhalation daily    acetaminophen  Suppository .. 650 milliGRAM(s) Rectal every 4 hours PRN  acetaminophen  Suppository .. 650 milliGRAM(s) Rectal every 4 hours PRN  dexMEDEtomidine Infusion 0.3 MICROgram(s)/kG/Hr IV Continuous <Continuous>  valproate sodium IVPB 250 milliGRAM(s) IV Intermittent three times a day    lactulose Syrup 10 Gram(s) Oral three times a day  pantoprazole   Suspension 40 milliGRAM(s) Oral daily  senna 2 Tablet(s) Oral at bedtime        ascorbic acid 500 milliGRAM(s) Oral two times a day  calcium carbonate 1250 mG  + Vitamin D (OsCal 500 + D) 1 Tablet(s) Oral daily  cholecalciferol 2000 Unit(s) Oral two times a day  dextrose 5%. 1000 milliLiter(s) IV Continuous <Continuous>      latanoprost 0.005% Ophthalmic Solution 1 Drop(s) Both EYES at bedtime                          11.0   7.43  )-----------( 343      ( 06 Apr 2020 06:47 )             34.7       04-06    154<H>  |  110<H>  |  25<H>  ----------------------------<  204<H>  2.9<LL>   |  40<H>  |  0.35<L>    Ca    9.2      06 Apr 2020 06:47  Phos  2.7     04-06  Mg     2.0     04-06    TPro  5.6<L>  /  Alb  2.2<L>  /  TBili  0.4  /  DBili  x   /  AST  30  /  ALT  21  /  AlkPhos  49  04-06      CARDIAC MARKERS ( 06 Apr 2020 06:47 )  <.015 ng/mL / x     / x     / x     / x      CARDIAC MARKERS ( 05 Apr 2020 07:07 )  <.015 ng/mL / x     / x     / x     / x          PT/INR - ( 06 Apr 2020 06:47 )   PT: 17.0 sec;   INR: 1.50 ratio      PTT - ( 06 Apr 2020 06:47 )  PTT:62.2 sec      CENTRAL LINE: N       LORENZANA: Y       A-LINE: N         GLOBAL ISSUE/BEST PRACTICE:  Analgesia: Y  Sedation: Y - Precedex  CAM-ICU: Y  HOB elevation: Y  Stress ulcer prophylaxis: Y  VTE prophylaxis: Y  Glycemic control: N  Nutrition: Y    CODE STATUS: DNR/DNI

## 2020-04-06 NOTE — PROGRESS NOTE ADULT - SUBJECTIVE AND OBJECTIVE BOX
Patient is a 59y old  Female who presents with a chief complaint of covid19+  was sent for low oxygen (06 Apr 2020 19:09)      BRIEF HOSPITAL COURSE: 59y Female PMHx Severe MRDD ( resident of group home), CP, Sz Do, OP, Asthma.  Admitted 3/29/2020 after being sent to ED from [facility].  COVID + from days PTA. Hospital course complicated by AF with RVR, Non-oligouric Sheila and Hyperntremia.    Events last 24 hours: Worsening Hypoxia on NRM, despite Tx, Still on Plaquinel, Now with Advanced Directives DNR/DNI. Hypothermia.    PAST MEDICAL & SURGICAL HISTORY:  Osteoporosis  CP (cerebral palsy)  Scoliosis  Mentally challenged  Epilepsy  COPD (chronic obstructive pulmonary disease)  PVD (peripheral vascular disease)  Osteoporosis  Chest pain  Scoliosis  Seizure  MR (mental retardation), severe: immobilty sec to severe dissability  No significant past surgical history        Hosp day #8d    Fluid Balance +575 since admit    Vital signs / Reviewed and Physical Exam Performed where pertinent and urgently required    Lab / Radiology  studies / ABG / Meds -  reviewed and interpreted into the assessment and treatment plan.

## 2020-04-06 NOTE — PROGRESS NOTE ADULT - ASSESSMENT
59y Female PMHx Severe MRDD ( resident of group home), CP, Sz Do, OP, Asthma.  Admitted 3/29/2020 after being sent to ED from [facility].     Acute Hypoxic Respiratory Failure   Viral PNA / ARDS - COVID-19  AF with RVR  LEROY / hypernatremia   Given new advanced directives ok to downgrade to Medical Floor bed.   No Further Escalation of care is appropriate at this point.  59y Female PMHx Severe MRDD ( resident of group home), CP, Sz Do, OP, Asthma.  Admitted 3/29/2020 after being sent to ED from [facility].     Neuro - Off Precedex,  Low Dose Morphine for Pain or agitation    CV -  Remains HDS on Midodrine - increased to 5mg TID          Avoiding fluid challenges          QTC monitoring while on Hydroxychloroquine.          Rate controlled on PO Dilt     Pulm -  On NRM               Is now DNR/DNI     GI -  PPI  / Diet as tolerates - will need formal swallow eval     Renal - Even to negative fluid balance as tolerated by hemodynamics and renal fx.  Feeds to be provided in lieu of IVF.     Heme -  Pharmacologic DVT PPx  in addition to SCD's    ID - ABX discontinuation based on discussion with ID in conjunction with clinical features, culture data, and judicious procalcitonin monitoring.      Endo -  Aggressive glycemic control to limit FS glucose to < 180mg/dl.      COVID 19 specific considerations and therapeutic  options based on the available and rapidly changing literature    Goals of care considerations:  Ongoing assessment for patient specific treatment options based on progression or decline.  Patient now DNR / DNI.          45 Minutes of critical care team spent in the management of this critically ill COVID-19 Patient / PUI patient with continuous assessments and interventions based on the interpretation of multiple databases.      Patient now DNR / DNI.   Called Father tonight to clarify GOC and confirm Status.  Also No pressors to be used for hypotension aside from her PO midodrine and will not escalate care and if she deteriorates further focus on comfort.  Father in agreement with above. 59y Female PMHx Severe MRDD ( resident of group home), CP, Sz Do, OP, Asthma.  Admitted 3/29/2020 after being sent to ED from [facility].     Acute Hypoxic Respiratory Failure   Viral PNA / ARDS - COVID-19  AF with RVR  LEROY / hypernatremia   Given new advanced directives ok to downgrade to Medical Floor bed.   No Further Escalation of care is appropriate at this point.  59y Female PMHx Severe MRDD ( resident of group home), CP, Sz Do, OP, Asthma.  Admitted 3/29/2020 after being sent to ED from [facility].     Neuro - Off Precedex,  Low Dose Morphine for Pain or agitation    CV -  Remains HDS on Midodrine - increased to 5mg TID          Avoiding fluid challenges          QTC monitoring while on Hydroxychloroquine.          Rate controlled on PO Dilt     Pulm -  On NRM               Is now DNR/DNI     GI -  PPI  / Diet as tolerates - will need formal swallow eval     Renal - Even to negative fluid balance as tolerated by hemodynamics and renal fx.  Feeds to be provided in lieu of IVF.     Heme -  Pharmacologic DVT PPx  in addition to SCD's    ID - ABX discontinuation based on discussion with ID in conjunction with clinical features, culture data, and judicious procalcitonin monitoring.      Endo -  Aggressive glycemic control to limit FS glucose to < 180mg/dl.      COVID 19 specific considerations and therapeutic  options based on the available and rapidly changing literature    Goals of care considerations:  Ongoing assessment for patient specific treatment options based on progression or decline.  Patient now DNR / DNI.    Patient now DNR / DNI.   Called Father tonight to clarify GOC and confirm Status.  Also No pressors to be used for hypotension aside from her PO midodrine and will not escalate care and if she deteriorates further focus on comfort.   Notified father that patient will be transferred to regular medical floor.  Father in agreement with above.        45 Minutes of critical care team spent in the management of this critically ill COVID-19 Patient / PUI patient with continuous assessments and interventions based on the interpretation of multiple databases. 59y Female PMHx Severe MRDD ( resident of group home), CP, Sz Do, OP, Asthma.  Admitted 3/29/2020 after being sent to ED from [facility].     Acute Hypoxic Respiratory Failure   Viral PNA / ARDS - COVID-19  AF with RVR  LEROY / hypernatremia   Given new advanced directives ok to downgrade to Medical Floor bed.   No Further Escalation of care is appropriate at this point.      Neuro - Off Precedex,  Low Dose Morphine for Pain or agitation    CV -  Remains HDS on Midodrine - increased to 5mg TID          Avoiding fluid challenges          QTC monitoring while on Hydroxychloroquine.          Rate controlled on PO Dilt     Pulm -  On NRM               Is now DNR/DNI     GI -  PPI  / Diet as tolerates - will need formal swallow eval     Renal - Even to negative fluid balance as tolerated by hemodynamics and renal fx.  Feeds to be provided in lieu of IVF.     Heme -  Pharmacologic DVT PPx  in addition to SCD's    ID - ABX discontinuation based on discussion with ID in conjunction with clinical features, culture data, and judicious procalcitonin monitoring.      Endo -  Aggressive glycemic control to limit FS glucose to < 180mg/dl.      COVID 19 specific considerations and therapeutic  options based on the available and rapidly changing literature    Goals of care considerations:  Ongoing assessment for patient specific treatment options based on progression or decline.  Patient now DNR / DNI.    Patient now DNR / DNI.   Called Father tonight to clarify GOC and confirm Status.  Also No pressors to be used for hypotension aside from her PO midodrine and will not escalate care and if she deteriorates further focus on comfort.   Notified father that patient will be transferred to regular medical floor.  Father in agreement with above.        45 Minutes of critical care team spent in the management of this critically ill COVID-19 Patient / PUI patient with continuous assessments and interventions based on the interpretation of multiple databases.

## 2020-04-06 NOTE — PROGRESS NOTE ADULT - ASSESSMENT
Imp:   Acute hypoxic resp failure   ARDS  COVID-19 pneumonia   A.fib with RVR  Hx seizure d/o and MR    Plan:  Neuro: Not sedated Continue valproate    Cards: BP improved. Cont midodrine. AF is rate controlled. Cont verapamil as tolerated. F/U Cards. Continue apixaban    Pulm: Continue 100% NRM + NC O2, SpO2 ~ 84-96%. Appears improved overall. Cont on steroids.     Renal: Now hypernatremic. Will need to increase free water. Monitor UO, place Aponte for urinary retention     ID: COVID +. Not on systemic abx/ Continue Plaquenil     DVT ppx with apixaban   Prognosis overall is poor     Patient critically ill Imp:   Acute hypoxic resp failure   ARDS  COVID-19 pneumonia   A.fib with RVR  Hx seizure d/o and MR    Plan:    Neuro: Not sedated Continue valproate    Cards: BP improved. Cont midodrine. AF is rate controlled. Cont verapamil as tolerated. Continue Eliquis BID  On cardizem, digoxin    Pulm: Continue 100% NRM + NC O2, SpO2 ~ 89-91% Appears improved overall. Cont on steroids.     Renal: Now hypernatremic. Will need to increase free water. Monitor UO, place Aponte for urinary retention  -Hypokalemia- repleted with KCL 10 meq x 3     ID: COVID +. Not on systemic abx/ Continue Plaquenil until 4/7    DVT ppx with apixaban   Prognosis overall is poor     Patient critically ill 59F with known PAF currently in atrial fibrillation in the setting of cerebral palsy, mental retardation, COPD, PAD, admitted with acute hypoxic resp failure 2/2 COVID-19 pneumonia with ARDS and A.fib with RVR.    Plan:    Neuro: On Precedex for agitation. Continue Valproate.    Cards: BP improved. Cont midodrine. AF is rate controlled. Cont Cardizem 30 mg PO QID and Cardizem gtt, Digoxin. Continue Eliquis BID.    Pulm: Hypoxic on 100% NRM + NC O2, related to agitation. Cont on steroids.     Renal: Remains hypernatremic. Will need to increase free water. Continue D5W. Monitor UOP, strict I/Os.    ID: COVID +. Not on systemic abx. Continue Plaquenil until 4/7.    Heme: DVT ppx with Apixaban.    Dispo: Prognosis overall is poor. Patient critically ill. Made DNR/DNI by family today.

## 2020-04-06 NOTE — PROGRESS NOTE ADULT - SUBJECTIVE AND OBJECTIVE BOX
PAST MEDICAL & SURGICAL HISTORY:  Osteoporosis  CP (cerebral palsy)  Scoliosis  Mentally challenged  Epilepsy  COPD (chronic obstructive pulmonary disease)  PVD (peripheral vascular disease)  Osteoporosis  Chest pain  Scoliosis  Seizure  MR (mental retardation), severe: immobilty sec to severe dissability  No significant past surgical history      MEDICATIONS  (STANDING):  ALBUTerol    90 MICROgram(s) HFA Inhaler 2 Puff(s) Inhalation every 6 hours  apixaban 5 milliGRAM(s) Oral two times a day  ascorbic acid 500 milliGRAM(s) Oral two times a day  calcium carbonate 1250 mG  + Vitamin D (OsCal 500 + D) 1 Tablet(s) Oral daily  cholecalciferol 2000 Unit(s) Oral two times a day  dexMEDEtomidine Infusion 0.3 MICROgram(s)/kG/Hr (4.13 mL/Hr) IV Continuous <Continuous>  dextrose 5%. 1000 milliLiter(s) (50 mL/Hr) IV Continuous <Continuous>  digoxin     Tablet 0.125 milliGRAM(s) Oral daily  diltiazem    Tablet 30 milliGRAM(s) Oral four times a day  diltiazem Infusion 5 mG/Hr (5 mL/Hr) IV Continuous <Continuous>  hydroxychloroquine   Oral   hydroxychloroquine 200 milliGRAM(s) Oral every 12 hours  lactulose Syrup 10 Gram(s) Oral three times a day  latanoprost 0.005% Ophthalmic Solution 1 Drop(s) Both EYES at bedtime  methylPREDNISolone sodium succinate Injectable 60 milliGRAM(s) IV Push daily  midodrine. 2.5 milliGRAM(s) Oral three times a day  montelukast 10 milliGRAM(s) Oral at bedtime  pantoprazole   Suspension 40 milliGRAM(s) Oral daily  senna 2 Tablet(s) Oral at bedtime  tiotropium 18 MICROgram(s) Capsule 1 Capsule(s) Inhalation daily  valproate sodium IVPB 250 milliGRAM(s) IV Intermittent three times a day    MEDICATIONS  (PRN):  acetaminophen  Suppository .. 650 milliGRAM(s) Rectal every 4 hours PRN Temp greater or equal to 38C (100.4F)  acetaminophen  Suppository .. 650 milliGRAM(s) Rectal every 4 hours PRN Mild Pain (1 - 3)      Patient is a 59y old  Female who presents with a chief complaint of covid19+  was sent for low oxygen (06 Apr 2020 09:09)      Vital Signs Last 24 Hrs  T(C): 33.2 (06 Apr 2020 19:00), Max: 36.6 (05 Apr 2020 21:00)  T(F): 91.7 (06 Apr 2020 19:00), Max: 97.8 (05 Apr 2020 21:00)  HR: 62 (06 Apr 2020 19:00) (50 - 97)  BP: 105/73 (06 Apr 2020 19:00) (89/55 - 114/67)  BP(mean): 80 (06 Apr 2020 04:00) (63 - 80)  RR: 26 (06 Apr 2020 19:00) (23 - 40)  SpO2: 95% (06 Apr 2020 19:00) (80% - 95%)          04-05 @ 07:01  -  04-06 @ 07:00  --------------------------------------------------------  IN: 1025.5 mL / OUT: 720 mL / NET: 305.5 mL    04-06 @ 07:01  -  04-06 @ 19:09  --------------------------------------------------------  IN: 850 mL / OUT: 280 mL / NET: 570 mL        REVIEW OF SYSTEMS:    Constitutional: No fever, weight loss or fatigue  Eyes: No eye pain, visual disturbances, or discharge  ENT:  No difficulty hearing, tinnitus, vertigo; No sinus or throat pain  Neck: No pain or stiffness  Breasts: No pain, masses or nipple discharge  Respiratory: No cough, wheezing, chills or hemoptysis  Cardiovascular: No chest pain, palpitations, shortness of breath, dizziness or leg swelling  Gastrointestinal: No abdominal or epigastric pain. No nausea, vomiting or hematemesis; No diarrhea or constipation. No melena or hematochezia.  Genitourinary: No dysuria, frequency, hematuria or incontinence  Rectal: No pain, hemorrhoids or incontinence  Neurological: No headaches, memory loss, loss of strength, numbness or tremors  Skin: No itching, burning, rashes or lesions   Lymph Nodes: No enlarged glands  Endocrine: No heat or cold intolerance; No hair loss  Musculoskeletal: No joint pain or swelling; No muscle, back or extremity pain  Psychiatric: No depression, anxiety, mood swings or difficulty sleeping  Heme/Lymph: No easy bruising or bleeding gums  Allergy and Immunologic: No hives or eczema    PHYSICAL EXAM:    Constitutional: NAD, well-groomed, well-developed  HEENT: PERRLA, EOMI, Normal Hearing, MMM  Neck: No LAD, No JVD  Back: Normal spine flexure, No CVA tenderness  Respiratory: CTAB/L   Cardiovascular: S1 and S2, RRR, no M/G/R  Gastrointestinal: BS+, soft, NT/ND  Extremities: No peripheral edema  Vascular: 2+ peripheral pulses  Neurological: A/O x 3, no focal deficits  Skin: No rashes      decubiti:                           11.0   7.43  )-----------( 343      ( 06 Apr 2020 06:47 )             34.7     04-06    154<H>  |  110<H>  |  25<H>  ----------------------------<  204<H>  2.9<LL>   |  40<H>  |  0.35<L>    Ca    9.2      06 Apr 2020 06:47  Phos  2.7     04-06  Mg     2.0     04-06    TPro  5.6<L>  /  Alb  2.2<L>  /  TBili  0.4  /  DBili  x   /  AST  30  /  ALT  21  /  AlkPhos  49  04-06    PT/INR - ( 06 Apr 2020 06:47 )   PT: 17.0 sec;   INR: 1.50 ratio         PTT - ( 06 Apr 2020 06:47 )  PTT:62.2 sec    CARDIAC MARKERS ( 06 Apr 2020 06:47 )  <.015 ng/mL / x     / x     / x     / x      CARDIAC MARKERS ( 05 Apr 2020 07:07 )  <.015 ng/mL / x     / x     / x     / x          04-06 @ 06:47    TSH: --  B12:  --   Folate: --   KEV: --   Rheumatoid: --   Ammonia:  --   CPK:  --  Total PSA:  --  Free PSA: --  Cortisol: --  C-Diff:  --  BNP:  --  SPEP: --  Troponin:  <.015  CKMB: --  Valproic acid level:  --  tegretol level: --  dilantin level:  --  phenobarb level: --  keppra level:  --            Radiology:
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
## 2020-04-07 DIAGNOSIS — J96.01 ACUTE RESPIRATORY FAILURE WITH HYPOXIA: ICD-10-CM

## 2020-04-07 LAB
APTT BLD: 44.1 SEC — HIGH (ref 28.5–37)
CRP SERPL-MCNC: 5.11 MG/DL — HIGH (ref 0–0.4)
D DIMER BLD IA.RAPID-MCNC: 2301 NG/ML DDU — HIGH
FERRITIN SERPL-MCNC: 1194 NG/ML — HIGH (ref 15–150)
INR BLD: 1.43 RATIO — HIGH (ref 0.88–1.16)
PROTHROM AB SERPL-ACNC: 16.2 SEC — HIGH (ref 10–12.9)

## 2020-04-07 PROCEDURE — 99232 SBSQ HOSP IP/OBS MODERATE 35: CPT

## 2020-04-07 RX ORDER — ENOXAPARIN SODIUM 100 MG/ML
50 INJECTION SUBCUTANEOUS
Refills: 0 | Status: DISCONTINUED | OUTPATIENT
Start: 2020-04-07 | End: 2020-04-09

## 2020-04-07 RX ORDER — METOPROLOL TARTRATE 50 MG
5 TABLET ORAL EVERY 6 HOURS
Refills: 0 | Status: DISCONTINUED | OUTPATIENT
Start: 2020-04-07 | End: 2020-04-09

## 2020-04-07 RX ADMIN — Medication 100 MILLIGRAM(S): at 06:38

## 2020-04-07 RX ADMIN — SODIUM CHLORIDE 50 MILLILITER(S): 9 INJECTION, SOLUTION INTRAVENOUS at 06:38

## 2020-04-07 RX ADMIN — LATANOPROST 1 DROP(S): 0.05 SOLUTION/ DROPS OPHTHALMIC; TOPICAL at 23:46

## 2020-04-07 RX ADMIN — Medication 100 MILLIGRAM(S): at 14:29

## 2020-04-07 RX ADMIN — Medication 100 MILLIGRAM(S): at 22:12

## 2020-04-07 RX ADMIN — Medication 60 MILLIGRAM(S): at 06:38

## 2020-04-07 RX ADMIN — ENOXAPARIN SODIUM 50 MILLIGRAM(S): 100 INJECTION SUBCUTANEOUS at 14:26

## 2020-04-07 RX ADMIN — MIDODRINE HYDROCHLORIDE 5 MILLIGRAM(S): 2.5 TABLET ORAL at 00:12

## 2020-04-07 NOTE — PROGRESS NOTE ADULT - SUBJECTIVE AND OBJECTIVE BOX
[facility] -- Kayley Jiménez, Servando Dockery, Ap Barros Savella, [place]  Office # [phone]    Follow Up:  New Afib with RVR    Subjective/Observations: Awake but lethargic, non-verbal.  On % FIO2.  Not in respiratory distress    REVIEW OF SYSTEMS: All other review of systems is negative unless indicated above  PAST MEDICAL & SURGICAL HISTORY:  Osteoporosis  CP (cerebral palsy)  Scoliosis  Mentally challenged  Epilepsy  COPD (chronic obstructive pulmonary disease)  PVD (peripheral vascular disease)  Osteoporosis  Chest pain  Scoliosis  Seizure  MR (mental retardation), severe: immobilty sec to severe dissability  No significant past surgical history    MEDICATIONS  (STANDING):  ALBUTerol    90 MICROgram(s) HFA Inhaler 2 Puff(s) Inhalation every 6 hours  apixaban 5 milliGRAM(s) Oral two times a day  ascorbic acid 500 milliGRAM(s) Oral two times a day  calcium carbonate 1250 mG  + Vitamin D (OsCal 500 + D) 1 Tablet(s) Oral daily  cholecalciferol 2000 Unit(s) Oral two times a day  dextrose 5%. 1000 milliLiter(s) (50 mL/Hr) IV Continuous <Continuous>  digoxin     Tablet 0.125 milliGRAM(s) Oral daily  diltiazem    Tablet 30 milliGRAM(s) Oral four times a day  hydroxychloroquine   Oral   hydroxychloroquine 200 milliGRAM(s) Oral every 12 hours  lactulose Syrup 10 Gram(s) Oral three times a day  latanoprost 0.005% Ophthalmic Solution 1 Drop(s) Both EYES at bedtime  midodrine. 5 milliGRAM(s) Oral three times a day  montelukast 10 milliGRAM(s) Oral at bedtime  pantoprazole   Suspension 40 milliGRAM(s) Oral daily  senna 2 Tablet(s) Oral at bedtime  tiotropium 18 MICROgram(s) Capsule 1 Capsule(s) Inhalation daily  valproate sodium IVPB 250 milliGRAM(s) IV Intermittent three times a day    MEDICATIONS  (PRN):  acetaminophen  Suppository .. 650 milliGRAM(s) Rectal every 4 hours PRN Temp greater or equal to 38C (100.4F)  acetaminophen  Suppository .. 650 milliGRAM(s) Rectal every 4 hours PRN Mild Pain (1 - 3)  morphine  - Injectable 2 milliGRAM(s) IV Push every 6 hours PRN Moderate Pain (4 - 6) or Aggitation or Tacypenia > 40    Allergies    barbiturates (Unknown)  Colace (Unknown)  docusate (Unknown)  phenobarbital (Unknown)    Intolerances    Vital Signs Last 24 Hrs  T(C): 37.1 (07 Apr 2020 05:00), Max: 37.1 (07 Apr 2020 05:00)  T(F): 98.8 (07 Apr 2020 05:00), Max: 98.8 (07 Apr 2020 05:00)  HR: 84 (07 Apr 2020 05:00) (50 - 95)  BP: 90/50 (07 Apr 2020 05:00) (75/27 - 107/64)  BP(mean): --  RR: 22 (07 Apr 2020 05:00) (14 - 34)  SpO2: 85% (07 Apr 2020 05:00) (78% - 95%)  I&O's Summary    06 Apr 2020 07:01  -  07 Apr 2020 07:00  --------------------------------------------------------  IN: 850 mL / OUT: 280 mL / NET: 570 mL    PHYSICAL EXAM:  TELE: not on tele    Constitutional: NAD, anon-verbal, well-developed  HEENT: Moist Mucous Membranes, Anicteric  Pulmonary: Non-labored, breath sounds are diminished bilaterally, No wheezing, rales or rhonchi  Cardiovascular: IRRR, S1 and S2, No murmurs, rubs, gallops or clicks  Gastrointestinal: Bowel Sounds present, soft, nontender.   : Aponte in situ  Lymph: No peripheral edema. No lymphadenopathy.  Skin: No visible rashes or ulcers.  Psych:  Mood & affect: Flat, non-verbal  LABS: All Labs Reviewed:                        11.0   7.43  )-----------( 343      ( 06 Apr 2020 06:47 )             34.7                         12.8   7.84  )-----------( 508      ( 05 Apr 2020 07:07 )             40.3                         12.8   8.22  )-----------( 414      ( 04 Apr 2020 14:39 )             39.3     06 Apr 2020 06:47    154    |  110    |  25     ----------------------------<  204    2.9     |  40     |  0.35   05 Apr 2020 07:07    156    |  112    |  26     ----------------------------<  109    3.5     |  40     |  0.43   04 Apr 2020 14:39    153    |  110    |  23     ----------------------------<  125    3.0     |  38     |  0.40     Ca    9.2        06 Apr 2020 06:47  Ca    10.0       05 Apr 2020 07:07  Ca    9.5        04 Apr 2020 14:39  Phos  2.7       06 Apr 2020 06:47  Phos  3.2       05 Apr 2020 07:07  Mg     2.0       06 Apr 2020 06:47  Mg     2.2       05 Apr 2020 07:07    TPro  5.6    /  Alb  2.2    /  TBili  0.4    /  DBili  x      /  AST  30     /  ALT  21     /  AlkPhos  49     06 Apr 2020 06:47  TPro  6.3    /  Alb  2.4    /  TBili  0.4    /  DBili  x      /  AST  29     /  ALT  22     /  AlkPhos  56     05 Apr 2020 07:07  TPro  6.2    /  Alb  2.2    /  TBili  0.4    /  DBili  x      /  AST  32     /  ALT  23     /  AlkPhos  58     04 Apr 2020 14:39    PT/INR - ( 07 Apr 2020 09:26 )   PT: 16.2 sec;   INR: 1.43 ratio      PTT - ( 07 Apr 2020 09:26 )  PTT:44.1 sec  CARDIAC MARKERS ( 06 Apr 2020 06:47 )  <.015 ng/mL / x     / x     / x     / x        < from: TTE Echo Doppler w/o Cont (04.13.14 @ 13:57) >     EXAM:  ECHO TTE W/O CON COMP W/DOPPLR      PROCEDURE DATE:  04/13/2014    INTERPRETATION:  INDICATION: Shortness of breath    Blood Pressure 141/93    Height [none]     Weight [none]       BSA 1.65   sq m    Dimensions:   LA [none]      Normal Values: 2.0 - 4.0 cm   Ao 3.1 cm        Normal Values: 2.0 - 3.8 cm  SEPTUM 0.8 cm       Normal Values: 0.6 - 1.2 cm  PWT 0.9 cm       Normal Values: 0.6 - 1.1 cm  LVIDd 4.0 cm        Normal Values: 3.0 - 5.6 cm  LVIDs 2.1 cm        Normal Values:1.8 - 4.0 cm    Ejection Fraction:  55 to 60% by visual assessment.    OBSERVATIONS:  Image quality is poor.  Mitral Valve: Normal morphology. No regurgitation, no stenosis.  Aortic Valve/Aorta: Valve is not well visualized. No regurgitation, no   stenosis.  Tricuspid Valve: Normal morphology. No regurgitation, no stenosis.  Pulmonic Valve: Not well visualized. No regurgitation, no stenosis.  Left Atrium: Appears normal in size. Volumetric assessment not performed.  Right Atrium: Not well visualized.  Left Ventricle: Normal chamber size, normal wall thickness. Systolic   function is in the low-normal range. Inferolateral wall appears   hypokinetic; however, regional wall motion cannot be accurately assessed   due to suboptimal image quality. Diastolic function cannot be assessed on   the basis of this study.  Right Ventricle: Normal size; grossly normal systolic function.  Pericardium/Pleura: A fat pad is present. No pericardial effusion.  Pulmonary/RV Pressure: Unable to estimate PA systolic pressure due to   inadequate tricuspid regurgitant jet.  IVC: Not well visualized.     Conclusion:   1. Left ventricular size is normal; LV systolic function is in the   low-normal range.  2. Inferolateral wall appears hypokinetic; however, regional wall motion   cannot be definitively assessed due to poor image quality.   3. Unable to assess diastolic function on the basis of this study.  4. Right ventricular size is normal; RV systolic function is grossly   normal.  5. No significant valvular disease.    EB MIX M.D., ATTENDING CARDIOLOGIST  This examination was interpreted on: Apr 13 2014  4:24P.  This document   has been electronically signed. Apr 13 2014  4:38P.    < end of copied text >  < from: Xray Chest 1 View- PORTABLE-Urgent (04.03.20 @ 10:55) >    EXAM:  XR CHEST PORTABLE URGENT 1V                          PROCEDURE DATE:  04/03/2020      INTERPRETATION:  Portable chest x-ray    Indication: Desaturation. COVID-19 infection.    Portable chest x-ray is compared to a previous examination dated 4/1/2020.    Impression: Complete opacification in the left lung may be due to dense pulmonary consolidation and/or pleural effusion.    Airspace opacification in the right lung, slightly improved in the right upper lung zone and slightly worsened in the right lower lung zone.    No evidence for pneumothorax.    Grossly stable cardiac silhouette.    MITCHELL RODRIGUEZ M.D., ATTENDING RADIOLOGIST  This document has been electronically signed. Apr  3 2020 11:01AM     < end of copied text >    < from: 12 Lead ECG (03.29.20 @ 00:10) >    Ventricular Rate 96 BPM    Atrial Rate 416 BPM    QRS Duration 74 ms    Q-T Interval 368 ms    QTC Calculation(Bezet) 464 ms    R Axis 40 degrees    T Axis 23 degrees    Diagnosis Line Atrial fibrillation  Nonspecific ST and T wave abnormality  Prolonged QT  Abnormal ECG  When compared with ECG of 02-DEC-2019 07:54,  No significant change was found  Confirmed by Denys Al MD ([id number]) on 3/29/2020 1:51:29 PM    < end of copied text >

## 2020-04-07 NOTE — PROGRESS NOTE ADULT - ASSESSMENT
59F with known PAF currently in atrial fibrillation in the setting of cerebral palsy, mental retardation, COPD, PAD, admitted with acute hypoxic resp failure 2/2 COVID-19 pneumonia with ARDS and A.fib with RVR.    New onset of Afib with RVR  - She has no known Hx of Afib  - Rapid Afib s/p Cardizem drip.  Continue Cardizem 30 mg q6H with parameter.  Her rate has been controlled  - Continue Midodrine to allow AVN administration.  May increase as needed  - Continue Eliquis.  Monitor for s/s bleeding  - Monitor electrolytes, replete to keep K>4 and Mag>2    COVID+  - s/p hypoxic respiratory failure.  Still requiring % FIO2  - Supportive management  - Aspiration precaution  - Continue Plaquenil as per Primary    Hypernatremia  - Remains hypernatremic at 154 as of yesterday.  No labs today   - Continue hypotonic solution    Other workup per Primary  will continue to follow      Norma Tucker DNP, NP-C  Cardiology   Spectra #[phone] 59F with known PAF currently in atrial fibrillation in the setting of cerebral palsy, mental retardation, COPD, PAD, admitted with acute hypoxic resp failure 2/2 COVID-19 pneumonia with ARDS and A.fib with RVR.    New onset of Afib with RVR  - Rapid Afib s/p Cardizem drip.  Continue Cardizem 30 mg q6H with parameter.  Her rate has been controlled  - Continue Midodrine to allow AVN administration.  May increase as needed  - Continue Eliquis.  Monitor for s/s bleeding  - Monitor electrolytes, replete to keep K>4 and Mag>2    COVID+  - s/p hypoxic respiratory failure.  Still requiring % FIO2  - Supportive management  - Aspiration precaution  - Continue Plaquenil as per Primary    Hypernatremia  - Remains hypernatremic at 154 as of yesterday.  No labs today   - Continue hypotonic solution    Other workup per Primary  will continue to follow      Norma Tucker DNP, NP-C  Cardiology   Spectra #[phone]

## 2020-04-07 NOTE — PROGRESS NOTE ADULT - PROBLEM SELECTOR PLAN 1
dnr/dni  I spoke to family  patient overall prognosis poor  overall prognosis poor  I prepared she may not make it over night

## 2020-04-07 NOTE — PROGRESS NOTE ADULT - ASSESSMENT
VITALS/LABS    2020 110/90     2020 afeb 75 130/70  2020 w 12.7 Hb 13.5 Plt 387   Na 146 K 3.2 CO2 33 Cr .3     PT DATA/BEST PRACTICE  ALLERGY     NOTEWORTHY  POINTS/CHANGES ROS/PE                                  WT  59 (3/30/2020)                    BMI   27 (3/30/2020)        ADVANCED DIRECTIVE     DNR  Goals of care discussion                                                                                      HEAD OF BED ELEVATION Yes  DYSPHAGIA EVAL                                  DIET      dys 1 puree   honey (3/29)                                  IV F   ns 100 (3/29)  --> NS 50 (3/31)                                                    DVT PROPHYLAXIS   M apixa 5.2 (3/30)  ( a fib)--> lvnx 50.2 ()                   STRESS ULCER PROPHYLAXIS                                                                  INFECTION PPLX    ECHO  BNP 3/31/2020 bnp 3539                  CXR      2020 cxr porfression of bl airspace dz     3/30/2020 cxr incr bl perihilar markings                   CT                                                                              MICROBIO         3/30/2020 Blod c n   3/31/2020 pc n           ABIO                    BRIEF ASSESSMENT PLAN      59 F HO CP MR PA fib c DHF ADMITTED 3/29 WITH COVID PNEUMONIA RESP FAILURE   RESP FAILURE IF WORSE ICU    COPD ON BD   PA fib ON APIXABAN   DCHF MONITORED   lasix 20 ()   HYPERNATREMIA  2020 Na 147   LEROY 2020 Cr 1.4   NONVIOLENT NONSELFDESTR LEVLE ONE Applied 2020          COVID  Supp care   SYMPTOM ONSET   Poss 3/27  OXYGENATION  Requiring nrb po 82% (2020)   PROGNOSIS   NLR    3/30-3/31--  4.8  - 8.3- 7.3 -9.4           MEDS   solumed 60.5d () (Dr SZYMANSKI)   HCQ () (Dr Fair)   PLAN   Supp care   Early intubation if desat   Avoid nebs bpap       TIME SPENT Over 25 minutes aggregate care time spent on encounter; activities included   direct pt care, counseling and/or coordinating care reviewing notes, lab data/ imaging , discussion with multidisciplinary team/ pt /family. Risks, benefits, alternatives  discussed in detail.    JAYA BEJARANO [facility] P [phone]  1960 DOA 3/30/2020  DR MODESTA RUBY

## 2020-04-07 NOTE — PROGRESS NOTE ADULT - SUBJECTIVE AND OBJECTIVE BOX
[place]    PLV 1EAS 101 D1    Patient is a 59y old  Female who presents with a chief complaint of covid19+  was sent for low oxygen (07 Apr 2020 18:20)       Allergies    barbiturates (Unknown)  Colace (Unknown)  docusate (Unknown)  phenobarbital (Unknown)    Intolerances        HPI:  patient was tested +covid19 few days ago  was sent by house for low oxygen (29 Mar 2020 01:07)      PAST MEDICAL & SURGICAL HISTORY:  Osteoporosis  CP (cerebral palsy)  Scoliosis  Mentally challenged  Epilepsy  COPD (chronic obstructive pulmonary disease)  PVD (peripheral vascular disease)  Osteoporosis  Chest pain  Scoliosis  Seizure  MR (mental retardation), severe: immobilty sec to severe dissability  No significant past surgical history      FAMILY HISTORY:        MEDICATIONS   acetaminophen  Suppository .. 650 milliGRAM(s) Rectal every 4 hours PRN  acetaminophen  Suppository .. 650 milliGRAM(s) Rectal every 4 hours PRN  ALBUTerol    90 MICROgram(s) HFA Inhaler 2 Puff(s) Inhalation every 6 hours  ascorbic acid 500 milliGRAM(s) Oral two times a day  calcium carbonate 1250 mG  + Vitamin D (OsCal 500 + D) 1 Tablet(s) Oral daily  cholecalciferol 2000 Unit(s) Oral two times a day  dextrose 5%. 1000 milliLiter(s) IV Continuous <Continuous>  digoxin     Tablet 0.125 milliGRAM(s) Oral daily  diltiazem    Tablet 30 milliGRAM(s) Oral four times a day  enoxaparin Injectable 50 milliGRAM(s) SubCutaneous two times a day  hydroxychloroquine   Oral   hydroxychloroquine 200 milliGRAM(s) Oral every 12 hours  lactulose Syrup 10 Gram(s) Oral three times a day  latanoprost 0.005% Ophthalmic Solution 1 Drop(s) Both EYES at bedtime  metoprolol tartrate Injectable 5 milliGRAM(s) IV Push every 6 hours  midodrine. 5 milliGRAM(s) Oral three times a day  montelukast 10 milliGRAM(s) Oral at bedtime  morphine  - Injectable 2 milliGRAM(s) IV Push every 6 hours PRN  pantoprazole   Suspension 40 milliGRAM(s) Oral daily  senna 2 Tablet(s) Oral at bedtime  tiotropium 18 MICROgram(s) Capsule 1 Capsule(s) Inhalation daily  valproate sodium IVPB 250 milliGRAM(s) IV Intermittent three times a day      Vital Signs Last 24 Hrs  T(C): 37.3 (07 Apr 2020 14:58), Max: 37.3 (07 Apr 2020 14:58)  T(F): 99.1 (07 Apr 2020 14:58), Max: 99.1 (07 Apr 2020 14:58)  HR: 78 (07 Apr 2020 14:58) (63 - 95)  BP: 110/39 (07 Apr 2020 14:58) (75/27 - 110/39)  BP(mean): --  RR: 15 (07 Apr 2020 14:58) (14 - 34)  SpO2: 82% (07 Apr 2020 14:58) (78% - 94%)      04-06-20 @ 07:01  -  04-07-20 @ 07:00  --------------------------------------------------------  IN: 850 mL / OUT: 280 mL / NET: 570 mL            LABS:                        15.2   36.39 )-----------( 237      ( 07 Apr 2020 12:06 )             46.0     04-07    147<H>  |  107  |  38<H>  ----------------------------<  124<H>  4.6   |  37<H>  |  1.40<H>    Ca    9.2      07 Apr 2020 12:06  Phos  2.8     04-07  Mg     1.9     04-07    TPro  6.2  /  Alb  2.5<L>  /  TBili  0.6  /  DBili  x   /  AST  50<H>  /  ALT  32  /  AlkPhos  66  04-07    PT/INR - ( 07 Apr 2020 09:26 )   PT: 16.2 sec;   INR: 1.43 ratio         PTT - ( 07 Apr 2020 09:26 )  PTT:44.1 sec          WBC:  WBC Count: 36.39 K/uL (04-07 @ 12:06)  WBC Count: 7.43 K/uL (04-06 @ 06:47)  WBC Count: 7.84 K/uL (04-05 @ 07:07)  WBC Count: 8.22 K/uL (04-04 @ 14:39)      MICROBIOLOGY:  RECENT CULTURES:        CARDIAC MARKERS ( 07 Apr 2020 12:06 )  <.015 ng/mL / x     / x     / x     / x      CARDIAC MARKERS ( 06 Apr 2020 06:47 )  <.015 ng/mL / x     / x     / x     / x            PT/INR - ( 07 Apr 2020 09:26 )   PT: 16.2 sec;   INR: 1.43 ratio         PTT - ( 07 Apr 2020 09:26 )  PTT:44.1 sec    Sodium:  Sodium, Serum: 147 mmol/L (04-07 @ 12:06)  Sodium, Serum: 154 mmol/L (04-06 @ 06:47)  Sodium, Serum: 156 mmol/L (04-05 @ 07:07)  Sodium, Serum: 153 mmol/L (04-04 @ 14:39)      1.40 mg/dL 04-07 @ 12:06  0.35 mg/dL 04-06 @ 06:47  0.43 mg/dL 04-05 @ 07:07  0.40 mg/dL 04-04 @ 14:39      Hemoglobin:  Hemoglobin: 15.2 g/dL (04-07 @ 12:06)  Hemoglobin: 11.0 g/dL (04-06 @ 06:47)  Hemoglobin: 12.8 g/dL (04-05 @ 07:07)  Hemoglobin: 12.8 g/dL (04-04 @ 14:39)      Platelets: Platelet Count - Automated: 237 K/uL (04-07 @ 12:06)  Platelet Count - Automated: 343 K/uL (04-06 @ 06:47)  Platelet Count - Automated: 508 K/uL (04-05 @ 07:07)  Platelet Count - Automated: 414 K/uL (04-04 @ 14:39)      LIVER FUNCTIONS - ( 07 Apr 2020 12:06 )  Alb: 2.5 g/dL / Pro: 6.2 g/dL / ALK PHOS: 66 U/L / ALT: 32 U/L / AST: 50 U/L / GGT: x                 RADIOLOGY & ADDITIONAL STUDIES:

## 2020-04-08 PROCEDURE — 99232 SBSQ HOSP IP/OBS MODERATE 35: CPT

## 2020-04-08 RX ADMIN — ENOXAPARIN SODIUM 50 MILLIGRAM(S): 100 INJECTION SUBCUTANEOUS at 05:25

## 2020-04-08 RX ADMIN — Medication 100 MILLIGRAM(S): at 05:21

## 2020-04-08 RX ADMIN — LATANOPROST 1 DROP(S): 0.05 SOLUTION/ DROPS OPHTHALMIC; TOPICAL at 23:40

## 2020-04-08 RX ADMIN — ENOXAPARIN SODIUM 50 MILLIGRAM(S): 100 INJECTION SUBCUTANEOUS at 17:31

## 2020-04-08 RX ADMIN — MORPHINE SULFATE 2 MILLIGRAM(S): 50 CAPSULE, EXTENDED RELEASE ORAL at 23:36

## 2020-04-08 RX ADMIN — Medication 100 MILLIGRAM(S): at 20:52

## 2020-04-08 RX ADMIN — Medication 100 MILLIGRAM(S): at 17:31

## 2020-04-08 NOTE — PROGRESS NOTE ADULT - SUBJECTIVE AND OBJECTIVE BOX
[facility] Cardiology    CHIEF COMPLAINT: Patient is a 59y old  Female who presents with a chief complaint of covid19+  was sent for low oxygen (08 Apr 2020 11:46)      Follow Up: [ ] Chest Pain      [ ] Dyspnea     [ ] Palpitations    [ ] Atrial Fibrillation     [ ] Ventricular Dysrhythmia    [ ] Abnormal EKG                      [ ] Abnormal Cardiac Enzymes     [ ] Valvular Disease    HPI:  patient was tested +covid19 few days ago  was sent by house for low oxygen (29 Mar 2020 01:07)      PAST MEDICAL & SURGICAL HISTORY:  Osteoporosis  CP (cerebral palsy)  Scoliosis  Mentally challenged  Epilepsy  COPD (chronic obstructive pulmonary disease)  PVD (peripheral vascular disease)  Osteoporosis  Chest pain  Scoliosis  Seizure  MR (mental retardation), severe: immobilty sec to severe dissability  No significant past surgical history      MEDICATIONS  (STANDING):  ALBUTerol    90 MICROgram(s) HFA Inhaler 2 Puff(s) Inhalation every 6 hours  ascorbic acid 500 milliGRAM(s) Oral two times a day  calcium carbonate 1250 mG  + Vitamin D (OsCal 500 + D) 1 Tablet(s) Oral daily  cholecalciferol 2000 Unit(s) Oral two times a day  dextrose 5%. 1000 milliLiter(s) (50 mL/Hr) IV Continuous <Continuous>  digoxin     Tablet 0.125 milliGRAM(s) Oral daily  diltiazem    Tablet 30 milliGRAM(s) Oral four times a day  enoxaparin Injectable 50 milliGRAM(s) SubCutaneous two times a day  lactulose Syrup 10 Gram(s) Oral three times a day  latanoprost 0.005% Ophthalmic Solution 1 Drop(s) Both EYES at bedtime  metoprolol tartrate Injectable 5 milliGRAM(s) IV Push every 6 hours  midodrine. 5 milliGRAM(s) Oral three times a day  montelukast 10 milliGRAM(s) Oral at bedtime  pantoprazole   Suspension 40 milliGRAM(s) Oral daily  senna 2 Tablet(s) Oral at bedtime  tiotropium 18 MICROgram(s) Capsule 1 Capsule(s) Inhalation daily  valproate sodium IVPB 250 milliGRAM(s) IV Intermittent three times a day    MEDICATIONS  (PRN):  acetaminophen  Suppository .. 650 milliGRAM(s) Rectal every 4 hours PRN Temp greater or equal to 38C (100.4F)  acetaminophen  Suppository .. 650 milliGRAM(s) Rectal every 4 hours PRN Mild Pain (1 - 3)  morphine  - Injectable 2 milliGRAM(s) IV Push every 6 hours PRN Moderate Pain (4 - 6) or Aggitation or Tacypenia > 40      Allergies    barbiturates (Unknown)  Colace (Unknown)  docusate (Unknown)  phenobarbital (Unknown)    Intolerances        REVIEW OF SYSTEMS:    CONSTITUTIONAL: No weakness, fevers or chills.   EYES/ENT: No visual changes;  No vertigo or throat pain   NECK: No pain or stiffness  RESPIRATORY: No cough, wheezing, hemoptysis; No shortness of breath  CARDIOVASCULAR: No chest pain or palpitations  GASTROINTESTINAL: No abdominal or epigastric pain. No nausea, vomiting, or hematemesis; No diarrhea or constipation. No melena or hematochezia.  GENITOURINARY: No dysuria, frequency or hematuria  NEUROLOGICAL: No numbness or weakness  SKIN: No itching, burning, rashes, or lesions   All other review of systems is negative unless indicated above    Vital Signs Last 24 Hrs  T(C): 37.1 (08 Apr 2020 13:02), Max: 37.3 (07 Apr 2020 14:58)  T(F): 98.8 (08 Apr 2020 13:02), Max: 99.1 (07 Apr 2020 14:58)  HR: 67 (08 Apr 2020 13:02) (67 - 108)  BP: 87/48 (08 Apr 2020 13:02) (87/48 - 110/39)  BP(mean): --  RR: 20 (08 Apr 2020 13:02) (15 - 22)  SpO2: 78% (08 Apr 2020 13:02) (78% - 82%)    I&O's Summary    07 Apr 2020 07:01  -  08 Apr 2020 07:00  --------------------------------------------------------  IN: 400 mL / OUT: 50 mL / NET: 350 mL        PHYSICAL EXAM:    Constitutional: Minimal respiratory distress,  Eyes:  EOMI,  Pupils round, No oral cyanosis.  HEENT: No exudate or erythema  Pulmonary: Decreased breath sounds bilaterally, mild wheeze  Cardiovascular: Tachycardic, S1 and S2, No murmurs,  Gastrointestinal: Bowel Sounds present, soft, nontender.   Ext: Trivial LE edema   Neurological: Alert  Skin: No rashes.  Psych:  MRDD    LABS: All Labs Reviewed:                        15.2   36.39 )-----------( 237      ( 07 Apr 2020 12:06 )             46.0     04-07    147<H>  |  107  |  38<H>  ----------------------------<  124<H>  4.6   |  37<H>  |  1.40<H>    Ca    9.2      07 Apr 2020 12:06  Phos  2.8     04-07  Mg     1.9     04-07    TPro  6.2  /  Alb  2.5<L>  /  TBili  0.6  /  DBili  x   /  AST  50<H>  /  ALT  32  /  AlkPhos  66  04-07    PT/INR - ( 07 Apr 2020 09:26 )   PT: 16.2 sec;   INR: 1.43 ratio         PTT - ( 07 Apr 2020 09:26 )  PTT:44.1 sec  CARDIAC MARKERS ( 07 Apr 2020 12:06 )  <.015 ng/mL / x     / x     / x     / x          · Assessment		  59F with known PAF, AFIB RVR, cerebral palsy, Mental retardation, COPD, PAD.  She has a CHADSVasc Score of 3 (Female, PAD, HTN).  s/p  ICU monitoring now on 1east.    COVID positive    digoxin     Tablet 0.125 milliGRAM(s) Oral daily  diltiazem    Tablet 30 milliGRAM(s) Oral four times a day-hold for SBP <90 mmHg  enoxaparin Injectable 50 milliGRAM(s) SubCutaneous two times a day. Pt was on Eliquis 5 BID earlier this admission  metoprolol tartrate Injectable 5 milliGRAM(s) IV Push every 6 hours  midodrine. 5 milliGRAM(s) Oral three times a day    Overall guarded prognosis

## 2020-04-08 NOTE — PROGRESS NOTE ADULT - ASSESSMENT
59F with known PAF currently in atrial fibrillation in the setting of cerebral palsy, mental retardation, COPD, PAD, admitted with acute hypoxic resp failure 2/2 COVID-19 pneumonia with ARDS and A.fib with RVR.    New onset of Afib with RVR  - Rapid Afib s/p Cardizem drip.  Her rate has been controlled.  Continue Cardizem 30 mg q6H with parameter.    - Continue Midodrine to allow AVN administration.  May increase as needed  - Continue Eliquis.  Monitor for s/s bleeding  - Monitor electrolytes, replete to keep K>4 and Mag>2    COVID+  - s/p hypoxic respiratory failure.  Still requiring % FIO2  - Supportive management  - Aspiration precaution  - Completed Plaquenil    Hypernatremia  - Remains hypernatremic, though, improved as of yesterday (147 <-- 154 <--156)  - Continue hypotonic solution    Other workup per Primary  will continue to follow      Norma Tucker DNP, NP-C  Cardiology   Spectra #[phone]

## 2020-04-08 NOTE — PROGRESS NOTE ADULT - SUBJECTIVE AND OBJECTIVE BOX
PAST MEDICAL & SURGICAL HISTORY:  Osteoporosis  CP (cerebral palsy)  Scoliosis  Mentally challenged  Epilepsy  COPD (chronic obstructive pulmonary disease)  PVD (peripheral vascular disease)  Osteoporosis  Chest pain  Scoliosis  Seizure  MR (mental retardation), severe: immobilty sec to severe dissability  No significant past surgical history      MEDICATIONS  (STANDING):  ALBUTerol    90 MICROgram(s) HFA Inhaler 2 Puff(s) Inhalation every 6 hours  ascorbic acid 500 milliGRAM(s) Oral two times a day  calcium carbonate 1250 mG  + Vitamin D (OsCal 500 + D) 1 Tablet(s) Oral daily  cholecalciferol 2000 Unit(s) Oral two times a day  dextrose 5%. 1000 milliLiter(s) (50 mL/Hr) IV Continuous <Continuous>  digoxin     Tablet 0.125 milliGRAM(s) Oral daily  diltiazem    Tablet 30 milliGRAM(s) Oral four times a day  enoxaparin Injectable 50 milliGRAM(s) SubCutaneous two times a day  lactulose Syrup 10 Gram(s) Oral three times a day  latanoprost 0.005% Ophthalmic Solution 1 Drop(s) Both EYES at bedtime  metoprolol tartrate Injectable 5 milliGRAM(s) IV Push every 6 hours  midodrine. 5 milliGRAM(s) Oral three times a day  montelukast 10 milliGRAM(s) Oral at bedtime  pantoprazole   Suspension 40 milliGRAM(s) Oral daily  senna 2 Tablet(s) Oral at bedtime  tiotropium 18 MICROgram(s) Capsule 1 Capsule(s) Inhalation daily  valproate sodium IVPB 250 milliGRAM(s) IV Intermittent three times a day    MEDICATIONS  (PRN):  acetaminophen  Suppository .. 650 milliGRAM(s) Rectal every 4 hours PRN Temp greater or equal to 38C (100.4F)  acetaminophen  Suppository .. 650 milliGRAM(s) Rectal every 4 hours PRN Mild Pain (1 - 3)  morphine  - Injectable 2 milliGRAM(s) IV Push every 6 hours PRN Moderate Pain (4 - 6) or Aggitation or Tacypenia > 40      Patient is a 59y old  Female who presents with a chief complaint of covid19+  was sent for low oxygen (08 Apr 2020 08:40)      Vital Signs Last 24 Hrs  T(C): 37.3 (08 Apr 2020 06:10), Max: 37.3 (07 Apr 2020 14:58)  T(F): 99.1 (08 Apr 2020 06:10), Max: 99.1 (07 Apr 2020 14:58)  HR: 108 (08 Apr 2020 06:10) (70 - 108)  BP: 92/56 (08 Apr 2020 06:10) (90/56 - 110/39)  BP(mean): --  RR: 22 (08 Apr 2020 06:10) (15 - 22)  SpO2: 80% (08 Apr 2020 06:10) (80% - 82%)          04-07 @ 07:01  -  04-08 @ 07:00  --------------------------------------------------------  IN: 400 mL / OUT: 50 mL / NET: 350 mL        REVIEW OF SYSTEMS:    Constitutional: No fever, weight loss or fatigue  Eyes: No eye pain, visual disturbances, or discharge  ENT:  No difficulty hearing, tinnitus, vertigo; No sinus or throat pain  Neck: No pain or stiffness  Breasts: No pain, masses or nipple discharge  Respiratory: No cough, wheezing, chills or hemoptysis  Cardiovascular: No chest pain, palpitations, shortness of breath, dizziness or leg swelling  Gastrointestinal: No abdominal or epigastric pain. No nausea, vomiting or hematemesis; No diarrhea or constipation. No melena or hematochezia.  Genitourinary: No dysuria, frequency, hematuria or incontinence  Rectal: No pain, hemorrhoids or incontinence  Neurological: No headaches, memory loss, loss of strength, numbness or tremors  Skin: No itching, burning, rashes or lesions   Lymph Nodes: No enlarged glands  Endocrine: No heat or cold intolerance; No hair loss  Musculoskeletal: No joint pain or swelling; No muscle, back or extremity pain  Psychiatric: No depression, anxiety, mood swings or difficulty sleeping  Heme/Lymph: No easy bruising or bleeding gums  Allergy and Immunologic: No hives or eczema    PHYSICAL EXAM:  laboured breathing  responds to deep stimuli only palpation to chest  Constitutional: NAD,   Respiratory: bl rhonci   Cardiovascular:distant  Gastrointestinal: BS+, soft, NT/ND  Extremities: No peripheral edema  ,lethargic  Skin: No rashes      decubiti: none                          15.2   36.39 )-----------( 237      ( 07 Apr 2020 12:06 )             46.0     04-07    147<H>  |  107  |  38<H>  ----------------------------<  124<H>  4.6   |  37<H>  |  1.40<H>    Ca    9.2      07 Apr 2020 12:06  Phos  2.8     04-07  Mg     1.9     04-07    TPro  6.2  /  Alb  2.5<L>  /  TBili  0.6  /  DBili  x   /  AST  50<H>  /  ALT  32  /  AlkPhos  66  04-07    PT/INR - ( 07 Apr 2020 09:26 )   PT: 16.2 sec;   INR: 1.43 ratio         PTT - ( 07 Apr 2020 09:26 )  PTT:44.1 sec    CARDIAC MARKERS ( 07 Apr 2020 12:06 )  <.015 ng/mL / x     / x     / x     / x          04-07 @ 12:06    -  Troponin:  <.015          Radiology:

## 2020-04-08 NOTE — PROVIDER CONTACT NOTE (OTHER) - ACTION/TREATMENT ORDERED:
RN notified MD and MD told RN that this pt has occurances of A-fib and that is her norm.
Dr aware. no new orders at this time.
will give 0600 verapamil at 0400 per . dr lee aware. no new orders at this time.

## 2020-04-08 NOTE — PROGRESS NOTE ADULT - SUBJECTIVE AND OBJECTIVE BOX
[facility] -- Kayley Jiménez, Servando Dockery, Ap Barros Savella, [place]  Office # [phone]    Follow Up:  New onset of Afib     Subjective/Observations:     REVIEW OF SYSTEMS: All other review of systems is negative unless indicated above  PAST MEDICAL & SURGICAL HISTORY:  Osteoporosis  CP (cerebral palsy)  Scoliosis  Mentally challenged  Epilepsy  COPD (chronic obstructive pulmonary disease)  PVD (peripheral vascular disease)  Osteoporosis  Chest pain  Scoliosis  Seizure  MR (mental retardation), severe: immobilty sec to severe dissability  No significant past surgical history    MEDICATIONS  (STANDING):  ALBUTerol    90 MICROgram(s) HFA Inhaler 2 Puff(s) Inhalation every 6 hours  ascorbic acid 500 milliGRAM(s) Oral two times a day  calcium carbonate 1250 mG  + Vitamin D (OsCal 500 + D) 1 Tablet(s) Oral daily  cholecalciferol 2000 Unit(s) Oral two times a day  dextrose 5%. 1000 milliLiter(s) (50 mL/Hr) IV Continuous <Continuous>  digoxin     Tablet 0.125 milliGRAM(s) Oral daily  diltiazem    Tablet 30 milliGRAM(s) Oral four times a day  enoxaparin Injectable 50 milliGRAM(s) SubCutaneous two times a day  lactulose Syrup 10 Gram(s) Oral three times a day  latanoprost 0.005% Ophthalmic Solution 1 Drop(s) Both EYES at bedtime  metoprolol tartrate Injectable 5 milliGRAM(s) IV Push every 6 hours  midodrine. 5 milliGRAM(s) Oral three times a day  montelukast 10 milliGRAM(s) Oral at bedtime  pantoprazole   Suspension 40 milliGRAM(s) Oral daily  senna 2 Tablet(s) Oral at bedtime  tiotropium 18 MICROgram(s) Capsule 1 Capsule(s) Inhalation daily  valproate sodium IVPB 250 milliGRAM(s) IV Intermittent three times a day    MEDICATIONS  (PRN):  acetaminophen  Suppository .. 650 milliGRAM(s) Rectal every 4 hours PRN Temp greater or equal to 38C (100.4F)  acetaminophen  Suppository .. 650 milliGRAM(s) Rectal every 4 hours PRN Mild Pain (1 - 3)  morphine  - Injectable 2 milliGRAM(s) IV Push every 6 hours PRN Moderate Pain (4 - 6) or Aggitation or Tacypenia > 40    Allergies    barbiturates (Unknown)  Colace (Unknown)  docusate (Unknown)  phenobarbital (Unknown)    Intolerances      Vital Signs Last 24 Hrs  T(C): 37.3 (08 Apr 2020 06:10), Max: 37.3 (07 Apr 2020 14:58)  T(F): 99.1 (08 Apr 2020 06:10), Max: 99.1 (07 Apr 2020 14:58)  HR: 108 (08 Apr 2020 06:10) (70 - 108)  BP: 92/56 (08 Apr 2020 06:10) (90/56 - 110/39)  BP(mean): --  RR: 22 (08 Apr 2020 06:10) (15 - 22)  SpO2: 80% (08 Apr 2020 06:10) (80% - 82%)  I&O's Summary    07 Apr 2020 07:01  -  08 Apr 2020 07:00  --------------------------------------------------------  IN: 400 mL / OUT: 50 mL / NET: 350 mL        PHYSICAL EXAM:  TELE:   Constitutional: NAD, awake and alert, well-developed  HEENT: Moist Mucous Membranes, Anicteric  Pulmonary: Non-labored, breath sounds are clear bilaterally, No wheezing, rales or rhonchi  Cardiovascular: Regular, S1 and S2, No murmurs, rubs, gallops or clicks  Gastrointestinal: Bowel Sounds present, soft, nontender.   Lymph: No peripheral edema. No lymphadenopathy.  Skin: No visible rashes or ulcers.  Psych:  Mood & affect appropriate  LABS: All Labs Reviewed:                        15.2   36.39 )-----------( 237      ( 07 Apr 2020 12:06 )             46.0                         11.0   7.43  )-----------( 343      ( 06 Apr 2020 06:47 )             34.7     07 Apr 2020 12:06    147    |  107    |  38     ----------------------------<  124    4.6     |  37     |  1.40   06 Apr 2020 06:47    154    |  110    |  25     ----------------------------<  204    2.9     |  40     |  0.35     Ca    9.2        07 Apr 2020 12:06  Ca    9.2        06 Apr 2020 06:47  Phos  2.8       07 Apr 2020 12:06  Phos  2.7       06 Apr 2020 06:47  Mg     1.9       07 Apr 2020 12:06  Mg     2.0       06 Apr 2020 06:47    TPro  6.2    /  Alb  2.5    /  TBili  0.6    /  DBili  x      /  AST  50     /  ALT  32     /  AlkPhos  66     07 Apr 2020 12:06  TPro  5.6    /  Alb  2.2    /  TBili  0.4    /  DBili  x      /  AST  30     /  ALT  21     /  AlkPhos  49     06 Apr 2020 06:47    PT/INR - ( 07 Apr 2020 09:26 )   PT: 16.2 sec;   INR: 1.43 ratio      PTT - ( 07 Apr 2020 09:26 )  PTT:44.1 sec  CARDIAC MARKERS ( 07 Apr 2020 12:06 )  <.015 ng/mL / x     / x     / x     / x        < from: TTE Echo Doppler w/o Cont (04.13.14 @ 13:57) >     EXAM:  ECHO TTE W/O CON COMP W/DOPPLR      PROCEDURE DATE:  04/13/2014    INTERPRETATION:  INDICATION: Shortness of breath    Blood Pressure 141/93    Height [none]     Weight [none]       BSA 1.65   sq m    Dimensions:   LA [none]      Normal Values: 2.0 - 4.0 cm   Ao 3.1 cm        Normal Values: 2.0 - 3.8 cm  SEPTUM 0.8 cm       Normal Values: 0.6 - 1.2 cm  PWT 0.9 cm       Normal Values: 0.6 - 1.1 cm  LVIDd 4.0 cm        Normal Values: 3.0 - 5.6 cm  LVIDs 2.1 cm        Normal Values:1.8 - 4.0 cm    Ejection Fraction:  55 to 60% by visual assessment.    OBSERVATIONS:  Image quality is poor.  Mitral Valve: Normal morphology. No regurgitation, no stenosis.  Aortic Valve/Aorta: Valve is not well visualized. No regurgitation, no   stenosis.  Tricuspid Valve: Normal morphology. No regurgitation, no stenosis.  Pulmonic Valve: Not well visualized. No regurgitation, no stenosis.  Left Atrium: Appears normal in size. Volumetric assessment not performed.  Right Atrium: Not well visualized.  Left Ventricle: Normal chamber size, normal wall thickness. Systolic   function is in the low-normal range. Inferolateral wall appears   hypokinetic; however, regional wall motion cannot be accurately assessed   due to suboptimal image quality. Diastolic function cannot be assessed on   the basis of this study.  Right Ventricle: Normal size; grossly normal systolic function.  Pericardium/Pleura: A fat pad is present. No pericardial effusion.  Pulmonary/RV Pressure: Unable to estimate PA systolic pressure due to   inadequate tricuspid regurgitant jet.  IVC: Not well visualized.     Conclusion:   1. Left ventricular size is normal; LV systolic function is in the   low-normal range.  2. Inferolateral wall appears hypokinetic; however, regional wall motion   cannot be definitively assessed due to poor image quality.   3. Unable to assess diastolic function on the basis of this study.  4. Right ventricular size is normal; RV systolic function is grossly   normal.  5. No significant valvular disease.    EB MIX M.D., ATTENDING CARDIOLOGIST  This examination was interpreted on: Apr 13 2014  4:24P.  This document   has been electronically signed. Apr 13 2014  4:38P.    < end of copied text >  < from: Xray Chest 1 View- PORTABLE-Urgent (04.03.20 @ 10:55) >    EXAM:  XR CHEST PORTABLE URGENT 1V                          PROCEDURE DATE:  04/03/2020      INTERPRETATION:  Portable chest x-ray    Indication: Desaturation. COVID-19 infection.    Portable chest x-ray is compared to a previous examination dated 4/1/2020.    Impression: Complete opacification in the left lung may be due to dense pulmonary consolidation and/or pleural effusion.    Airspace opacification in the right lung, slightly improved in the right upper lung zone and slightly worsened in the right lower lung zone.    No evidence for pneumothorax.    Grossly stable cardiac silhouette.    MITCHELL RODRIGUEZ M.D., ATTENDING RADIOLOGIST  This document has been electronically signed. Apr  3 2020 11:01AM     < end of copied text >    < from: 12 Lead ECG (03.29.20 @ 00:10) >    Ventricular Rate 96 BPM    Atrial Rate 416 BPM    QRS Duration 74 ms    Q-T Interval 368 ms    QTC Calculation(Bezet) 464 ms    R Axis 40 degrees    T Axis 23 degrees    Diagnosis Line Atrial fibrillation  Nonspecific ST and T wave abnormality  Prolonged QT  Abnormal ECG  When compared with ECG of 02-DEC-2019 07:54,  No significant change was found  Confirmed by Denys Al MD ([id number]) on 3/29/2020 1:51:29 PM    < end of copied text >

## 2020-04-08 NOTE — PROGRESS NOTE ADULT - ASSESSMENT
cont rx prognosis poor VITALS/LABS    2020 93/60     PT DATA/BEST PRACTICE  ALLERGY     NOTEWORTHY  POINTS/CHANGES ROS/PE                                  WT  59 (3/30/2020)                    BMI   27 (3/30/2020)        ADVANCED DIRECTIVE     DNR  Goals of care discussion                                                                                      HEAD OF BED ELEVATION Yes  DYSPHAGIA EVAL                                  DIET      dys 1 puree   honey (3/29)                                  IV F   ns 100 (3/29)  --> NS 50 (3/31)                                                    DVT PROPHYLAXIS   M apixa 5.2 (3/30)  ( a fib)--> lvnx 50.2 ()                  BRIEF ASSESSMENT PLAN      59 F HO CP MR PA fib c DHF ADMITTED 3/29 WITH COVID PNEUMONIA RESP FAILURE   RESP FAILURE IF WORSE ICU    COPD ON BD   PA fib ON APIXABAN   DCHF MONITORED   lasix 20 ()   HYPERNATREMIA  2020 Na 147   LEROY 2020 Cr 1.4   NONVIOLENT NONSELFDESTR LEVLE ONE Applied 2020          COVID  Supp care   SYMPTOM ONSET   Poss 3/27  OXYGENATION  Requiring nrb po 82% (2020)    nrb   PROGNOSIS   NLR    3/30-3/31--  4.8  - 8.3- 7.3 -9.4           MEDS   solumed 60.5d () (Dr SZYMANSKI)   HCQ () (Dr Fair)   PLAN   Supp care   Avoid nebs bpap       TIME SPENT Over 25 minutes aggregate care time spent on encounter; activities included   direct pt care, counseling and/or coordinating care reviewing notes, lab data/ imaging , discussion with multidisciplinary team/ pt /family. Risks, benefits, alternatives  discussed in detail.    JAYA BEJARANO [facility] P [phone]  1960 DOA 3/30/2020  DR MODESTA RUBY              cont rx prognosis poor

## 2020-04-08 NOTE — PROGRESS NOTE ADULT - ATTENDING COMMENTS
Imp:   Acute hypoxic resp failure   ARDS  COVID-19 pneumonia   A.fib with RVR  Hx seizure d/o and MR    Plan:  Not sedated   Continue valproate  BP is stable   A.fib with RVR - on verapamil, will try a small dose of metoprolol iv   Continue apixaban  Continue 100% NRM + NC O2, SpO2 ~ 89-90%  Started on steroids and Lasix  Monitor UO, place [place] for urinary retention   Not on systemic abx   Continue Plaquenil   DVT ppx with apixaban   Prognosis poor     Patient critically ill
Chart reviewed    Patient seen and examined    Agree with plan as outlined above
I personally saw and examined the patient in detail.  I have spoken to the above provider regarding the assessment and plan of care.  I reviewed the above assessment and plan of care, and agree.  I have made changes in the body of the note where appropriate.
I saw and examined the patient personally. Spoke with above provider regarding this case. I reviewed the above findings completely.  I agree with the above history, physical, and plan which I have edited where appropriate.
The patient was personally seen and examined, in addition to being examined and evaluated by housestaff/PA.  All elements of the note were edited where appropriate.    -there is no evidence of acute ischemia.  -there is no evidence for meaningful  volume overload.  -rate controlled af, cont ccb and ac  -remains severely hypoxic on nrm, worsens on the basis of agitation, for which she is on precedex  -unable to prone given baseline mental status  -unable to give some meds po, will transition all needed meds to iv if needed  -goc discussion with family this am by dr de la torre, awaiting definitive decision-making given that the patient is a [facility]  The patient remains at risk of abrupt decompensation.  I have personally provided  65 minutes of critical care time, excluding time spent on separate procedures.
I personally saw and examined the patient in detail.  I have spoken to the above provider regarding the assessment and plan of care.  I reviewed the above assessment and plan of care, and agree.  I have made changes in the body of the note where appropriate.

## 2020-04-08 NOTE — PROGRESS NOTE ADULT - SUBJECTIVE AND OBJECTIVE BOX
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 1000 milliLiter(s) (50 mL/Hr) IV Continuous <Continuous>  digoxin     Tablet 0.125 milliGRAM(s) Oral daily  diltiazem    Tablet 30 milliGRAM(s) Oral four times a day  enoxaparin Injectable 50 milliGRAM(s) SubCutaneous two times a day  lactulose Syrup 10 Gram(s) Oral three times a day  latanoprost 0.005% Ophthalmic Solution 1 Drop(s) Both EYES at bedtime  metoprolol tartrate Injectable 5 milliGRAM(s) IV Push every 6 hours  midodrine. 5 milliGRAM(s) Oral three times a day  montelukast 10 milliGRAM(s) Oral at bedtime  pantoprazole   Suspension 40 milliGRAM(s) Oral daily  senna 2 Tablet(s) Oral at bedtime  tiotropium 18 MICROgram(s) Capsule 1 Capsule(s) Inhalation daily  valproate sodium IVPB 250 milliGRAM(s) IV Intermittent three times a day    MEDICATIONS  (PRN):  acetaminophen  Suppository .. 650 milliGRAM(s) Rectal every 4 hours PRN Temp greater or equal to 38C (100.4F)  acetaminophen  Suppository .. 650 milliGRAM(s) Rectal every 4 hours PRN Mild Pain (1 - 3)  morphine  - Injectable 2 milliGRAM(s) IV Push every 6 hours PRN Moderate Pain (4 - 6) or Aggitation or Tacypenia > 40    Allergies    barbiturates (Unknown)  Colace (Unknown)  docusate (Unknown)  phenobarbital (Unknown)    Intolerances    Vital Signs Last 24 Hrs  T(C): 37.3 (08 Apr 2020 06:10), Max: 37.3 (07 Apr 2020 14:58)  T(F): 99.1 (08 Apr 2020 06:10), Max: 99.1 (07 Apr 2020 14:58)  HR: 108 (08 Apr 2020 06:10) (70 - 108)  BP: 92/56 (08 Apr 2020 06:10) (90/56 - 110/39)  BP(mean): --  RR: 22 (08 Apr 2020 06:10) (15 - 22)  SpO2: 80% (08 Apr 2020 06:10) (80% - 82%)  I&O's Summary    07 Apr 2020 07:01  -  08 Apr 2020 07:00  --------------------------------------------------------  IN: 400 mL / OUT: 50 mL / NET: 350 mL      PHYSICAL EXAM:  TELE: Not on tele  Constitutional: NAD, awake and alert, well-developed  HEENT: Moist Mucous Membranes, Anicteric  Pulmonary: Non-labored, breath sounds are clear bilaterally, No wheezing, rales or rhonchi  Cardiovascular: Regular, S1 and S2, No murmurs, rubs, gallops or clicks  Gastrointestinal: Bowel Sounds present, soft, nontender.   Lymph: No peripheral edema. No lymphadenopathy.  Skin: No visible rashes or ulcers.  Psych:  Mood & affect appropriate  LABS: All Labs Reviewed:                        15.2   36.39 )-----------( 237      ( 07 Apr 2020 12:06 )             46.0                         11.0   7.43  )-----------( 343      ( 06 Apr 2020 06:47 )             34.7     07 Apr 2020 12:06    147    |  107    |  38     ----------------------------<  124    4.6     |  37     |  1.40   06 Apr 2020 06:47    154    |  110    |  25     ----------------------------<  204    2.9     |  40     |  0.35     Ca    9.2        07 Apr 2020 12:06  Ca    9.2        06 Apr 2020 06:47  Phos  2.8       07 Apr 2020 12:06  Phos  2.7       06 Apr 2020 06:47  Mg     1.9       07 Apr 2020 12:06  Mg     2.0       06 Apr 2020 06:47    TPro  6.2    /  Alb  2.5    /  TBili  0.6    /  DBili  x      /  AST  50     /  ALT  32     /  AlkPhos  66     07 Apr 2020 12:06  TPro  5.6    /  Alb  2.2    /  TBili  0.4    /  DBili  x      /  AST  30     /  ALT  21     /  AlkPhos  49     06 Apr 2020 06:47    PT/INR - ( 07 Apr 2020 09:26 )   PT: 16.2 sec;   INR: 1.43 ratio      PTT - ( 07 Apr 2020 09:26 )  PTT:44.1 sec  CARDIAC MARKERS ( 07 Apr 2020 12:06 )  <.015 ng/mL / x     / x     / x     / x        < from: TTE Echo Doppler w/o Cont (04.13.14 @ 13:57) >     EXAM:  ECHO TTE W/O CON COMP W/DOPPLR      PROCEDURE DATE:  04/13/2014    INTERPRETATION:  INDICATION: Shortness of breath    Blood Pressure 141/93    Height [none]     Weight [none]       BSA 1.65   sq m    Dimensions:   LA [none]      Normal Values: 2.0 - 4.0 cm   Ao 3.1 cm        Normal Values: 2.0 - 3.8 cm  SEPTUM 0.8 cm       Normal Values: 0.6 - 1.2 cm  PWT 0.9 cm       Normal Values: 0.6 - 1.1 cm  LVIDd 4.0 cm        Normal Values: 3.0 - 5.6 cm  LVIDs 2.1 cm        Normal Values:1.8 - 4.0 cm    Ejection Fraction:  55 to 60% by visual assessment.    OBSERVATIONS:  Image quality is poor.  Mitral Valve: Normal morphology. No regurgitation, no stenosis.  Aortic Valve/Aorta: Valve is not well visualized. No regurgitation, no   stenosis.  Tricuspid Valve: Normal morphology. No regurgitation, no stenosis.  Pulmonic Valve: Not well visualized. No regurgitation, no stenosis.  Left Atrium: Appears normal in size. Volumetric assessment not performed.  Right Atrium: Not well visualized.  Left Ventricle: Normal chamber size, normal wall thickness. Systolic   function is in the low-normal range. Inferolateral wall appears   hypokinetic; however, regional wall motion cannot be accurately assessed   due to suboptimal image quality. Diastolic function cannot be assessed on   the basis of this study.  Right Ventricle: Normal size; grossly normal systolic function.  Pericardium/Pleura: A fat pad is present. No pericardial effusion.  Pulmonary/RV Pressure: Unable to estimate PA systolic pressure due to   inadequate tricuspid regurgitant jet.  IVC: Not well visualized.     Conclusion:   1. Left ventricular size is normal; LV systolic function is in the   low-normal range.  2. Inferolateral wall appears hypokinetic; however, regional wall motion   cannot be definitively assessed due to poor image quality.   3. Unable to assess diastolic function on the basis of this study.  4. Right ventricular size is normal; RV systolic function is grossly   normal.  5. No significant valvular disease.    EB MIX M.D., ATTENDING CARDIOLOGIST  This examination was interpreted on: Apr 13 2014  4:24P.  This document   has been electronically signed. Apr 13 2014  4:38P.    < end of copied text >  < from: Xray Chest 1 View- PORTABLE-Urgent (04.03.20 @ 10:55) >    EXAM:  XR CHEST PORTABLE URGENT 1V                          PROCEDURE DATE:  04/03/2020      INTERPRETATION:  Portable chest x-ray    Indication: Desaturation. COVID-19 infection.    Portable chest x-ray is compared to a previous examination dated 4/1/2020.    Impression: Complete opacification in the left lung may be due to dense pulmonary consolidation and/or pleural effusion.    Airspace opacification in the right lung, slightly improved in the right upper lung zone and slightly worsened in the right lower lung zone.    No evidence for pneumothorax.    Grossly stable cardiac silhouette.    MITCHELL RODRIGUEZ M.D., ATTENDING RADIOLOGIST  This document has been electronically signed. Apr  3 2020 11:01AM     < end of copied text >    < from: 12 Lead ECG (03.29.20 @ 00:10) >    Ventricular Rate 96 BPM    Atrial Rate 416 BPM    QRS Duration 74 ms    Q-T Interval 368 ms    QTC Calculation(Bezet) 464 ms    R Axis 40 degrees    T Axis 23 degrees    Diagnosis Line Atrial fibrillation  Nonspecific ST and T wave abnormality  Prolonged QT  Abnormal ECG  When compared with ECG of 02-DEC-2019 07:54,  No significant change was found  Confirmed by Denys Al MD ([id number]) on 3/29/2020 1:51:29 PM    < end of copied text > [facility] -- Kayley Jiménez, Servando Dockery, Ap Barros Savella, Goodger  Office # [phone]    Follow Up: New Afib with RVR     Subjective/Observations: Remains on NRM but not in distress.  Unable to obtain ROS as patient is non-verbal    REVIEW OF SYSTEMS: All other review of systems is negative unless indicated above  PAST MEDICAL & SURGICAL HISTORY:  Osteoporosis  CP (cerebral palsy)  Scoliosis  Mentally challenged  Epilepsy  COPD (chronic obstructive pulmonary disease)  PVD (peripheral vascular disease)  Osteoporosis  Chest pain  Scoliosis  Seizure  MR (mental retardation), severe: immobilty sec to severe dissability  No significant past surgical history    MEDICATIONS  (STANDING):  ALBUTerol    90 MICROgram(s) HFA Inhaler 2 Puff(s) Inhalation every 6 hours  ascorbic acid 500 milliGRAM(s) Oral two times a day  calcium carbonate 1250 mG  + Vitamin D (OsCal 500 + D) 1 Tablet(s) Oral daily  cholecalciferol 2000 Unit(s) Oral two times a day  dextrose 5%. 1000 milliLiter(s) (50 mL/Hr) IV Continuous <Continuous>  digoxin     Tablet 0.125 milliGRAM(s) Oral daily  diltiazem    Tablet 30 milliGRAM(s) Oral four times a day  enoxaparin Injectable 50 milliGRAM(s) SubCutaneous two times a day  lactulose Syrup 10 Gram(s) Oral three times a day  latanoprost 0.005% Ophthalmic Solution 1 Drop(s) Both EYES at bedtime  metoprolol tartrate Injectable 5 milliGRAM(s) IV Push every 6 hours  midodrine. 5 milliGRAM(s) Oral three times a day  montelukast 10 milliGRAM(s) Oral at bedtime  pantoprazole   Suspension 40 milliGRAM(s) Oral daily  senna 2 Tablet(s) Oral at bedtime  tiotropium 18 MICROgram(s) Capsule 1 Capsule(s) Inhalation daily  valproate sodium IVPB 250 milliGRAM(s) IV Intermittent three times a day    MEDICATIONS  (PRN):  acetaminophen  Suppository .. 650 milliGRAM(s) Rectal every 4 hours PRN Temp greater or equal to 38C (100.4F)  acetaminophen  Suppository .. 650 milliGRAM(s) Rectal every 4 hours PRN Mild Pain (1 - 3)  morphine  - Injectable 2 milliGRAM(s) IV Push every 6 hours PRN Moderate Pain (4 - 6) or Aggitation or Tacypenia > 40    Allergies    barbiturates (Unknown)  Colace (Unknown)  docusate (Unknown)  phenobarbital (Unknown)    Intolerances    Vital Signs Last 24 Hrs  T(C): 37.3 (08 Apr 2020 06:10), Max: 37.3 (07 Apr 2020 14:58)  T(F): 99.1 (08 Apr 2020 06:10), Max: 99.1 (07 Apr 2020 14:58)  HR: 108 (08 Apr 2020 06:10) (70 - 108)  BP: 92/56 (08 Apr 2020 06:10) (90/56 - 110/39)  BP(mean): --  RR: 22 (08 Apr 2020 06:10) (15 - 22)  SpO2: 80% (08 Apr 2020 06:10) (80% - 82%)  I&O's Summary    07 Apr 2020 07:01  -  08 Apr 2020 07:00  --------------------------------------------------------  IN: 400 mL / OUT: 50 mL / NET: 350 mL    PHYSICAL EXAM:  TELE: not on tele    Constitutional: NAD, anon-verbal, well-developed  HEENT: Moist Mucous Membranes, Anicteric  Pulmonary: Non-labored, breath sounds are diminished bilaterally, No wheezing, rales or rhonchi  Cardiovascular: IRRR, S1 and S2, No murmurs, rubs, gallops or clicks  Gastrointestinal: Bowel Sounds present, soft, nontender.   : Apotne in situ  Lymph: No peripheral edema. No lymphadenopathy.  Skin: No visible rashes or ulcers.  Psych:  Mood & affect: Flat, non-verbal      LABS: All Labs Reviewed:                        15.2   36.39 )-----------( 237      ( 07 Apr 2020 12:06 )             46.0                         11.0   7.43  )-----------( 343      ( 06 Apr 2020 06:47 )             34.7     07 Apr 2020 12:06    147    |  107    |  38     ----------------------------<  124    4.6     |  37     |  1.40   06 Apr 2020 06:47    154    |  110    |  25     ----------------------------<  204    2.9     |  40     |  0.35     Ca    9.2        07 Apr 2020 12:06  Ca    9.2        06 Apr 2020 06:47  Phos  2.8       07 Apr 2020 12:06  Phos  2.7       06 Apr 2020 06:47  Mg     1.9       07 Apr 2020 12:06  Mg     2.0       06 Apr 2020 06:47    TPro  6.2    /  Alb  2.5    /  TBili  0.6    /  DBili  x      /  AST  50     /  ALT  32     /  AlkPhos  66     07 Apr 2020 12:06  TPro  5.6    /  Alb  2.2    /  TBili  0.4    /  DBili  x      /  AST  30     /  ALT  21     /  AlkPhos  49     06 Apr 2020 06:47    PT/INR - ( 07 Apr 2020 09:26 )   PT: 16.2 sec;   INR: 1.43 ratio      PTT - ( 07 Apr 2020 09:26 )  PTT:44.1 sec  CARDIAC MARKERS ( 07 Apr 2020 12:06 )  <.015 ng/mL / x     / x     / x     / x        < from: TTE Echo Doppler w/o Cont (04.13.14 @ 13:57) >     EXAM:  ECHO TTE W/O CON COMP W/DOPPLR      PROCEDURE DATE:  04/13/2014    INTERPRETATION:  INDICATION: Shortness of breath    Blood Pressure 141/93    Height [none]     Weight [none]       BSA 1.65   sq m    Dimensions:   LA [none]      Normal Values: 2.0 - 4.0 cm   Ao 3.1 cm        Normal Values: 2.0 - 3.8 cm  SEPTUM 0.8 cm       Normal Values: 0.6 - 1.2 cm  PWT 0.9 cm       Normal Values: 0.6 - 1.1 cm  LVIDd 4.0 cm        Normal Values: 3.0 - 5.6 cm  LVIDs 2.1 cm        Normal Values:1.8 - 4.0 cm    Ejection Fraction:  55 to 60% by visual assessment.    OBSERVATIONS:  Image quality is poor.  Mitral Valve: Normal morphology. No regurgitation, no stenosis.  Aortic Valve/Aorta: Valve is not well visualized. No regurgitation, no   stenosis.  Tricuspid Valve: Normal morphology. No regurgitation, no stenosis.  Pulmonic Valve: Not well visualized. No regurgitation, no stenosis.  Left Atrium: Appears normal in size. Volumetric assessment not performed.  Right Atrium: Not well visualized.  Left Ventricle: Normal chamber size, normal wall thickness. Systolic   function is in the low-normal range. Inferolateral wall appears   hypokinetic; however, regional wall motion cannot be accurately assessed   due to suboptimal image quality. Diastolic function cannot be assessed on   the basis of this study.  Right Ventricle: Normal size; grossly normal systolic function.  Pericardium/Pleura: A fat pad is present. No pericardial effusion.  Pulmonary/RV Pressure: Unable to estimate PA systolic pressure due to   inadequate tricuspid regurgitant jet.  IVC: Not well visualized.     Conclusion:   1. Left ventricular size is normal; LV systolic function is in the   low-normal range.  2. Inferolateral wall appears hypokinetic; however, regional wall motion   cannot be definitively assessed due to poor image quality.   3. Unable to assess diastolic function on the basis of this study.  4. Right ventricular size is normal; RV systolic function is grossly   normal.  5. No significant valvular disease.    EB MIX M.D., ATTENDING CARDIOLOGIST  This examination was interpreted on: Apr 13 2014  4:24P.  This document   has been electronically signed. Apr 13 2014  4:38P.    < end of copied text >  < from: Xray Chest 1 View- PORTABLE-Urgent (04.03.20 @ 10:55) >    EXAM:  XR CHEST PORTABLE URGENT 1V                          PROCEDURE DATE:  04/03/2020      INTERPRETATION:  Portable chest x-ray    Indication: Desaturation. COVID-19 infection.    Portable chest x-ray is compared to a previous examination dated 4/1/2020.    Impression: Complete opacification in the left lung may be due to dense pulmonary consolidation and/or pleural effusion.    Airspace opacification in the right lung, slightly improved in the right upper lung zone and slightly worsened in the right lower lung zone.    No evidence for pneumothorax.    Grossly stable cardiac silhouette.    MITCHELL RODRIGUEZ M.D., ATTENDING RADIOLOGIST  This document has been electronically signed. Apr  3 2020 11:01AM     < end of copied text >    < from: 12 Lead ECG (03.29.20 @ 00:10) >    Ventricular Rate 96 BPM    Atrial Rate 416 BPM    QRS Duration 74 ms    Q-T Interval 368 ms    QTC Calculation(Bezet) 464 ms    R Axis 40 degrees    T Axis 23 degrees    Diagnosis Line Atrial fibrillation  Nonspecific ST and T wave abnormality  Prolonged QT  Abnormal ECG  When compared with ECG of 02-DEC-2019 07:54,  No significant change was found  Confirmed by Denys Al MD ([id number]) on 3/29/2020 1:51:29 PM    < end of copied text >

## 2020-04-08 NOTE — PROGRESS NOTE ADULT - PROBLEM SELECTOR PLAN 1
I discussed with family to stop all treatments but mom dad said they are not ready  will continue all treatment s for now  patient prognosis grim

## 2020-04-09 ENCOUNTER — APPOINTMENT (OUTPATIENT)
Dept: CARDIOLOGY | Facility: CLINIC | Age: 60
End: 2020-04-09

## 2020-04-09 VITALS
DIASTOLIC BLOOD PRESSURE: 22 MMHG | HEART RATE: 154 BPM | TEMPERATURE: 98 F | RESPIRATION RATE: 13 BRPM | SYSTOLIC BLOOD PRESSURE: 40 MMHG

## 2020-04-09 RX ORDER — ATROPINE SULFATE 1 %
4 DROPS OPHTHALMIC (EYE) EVERY 4 HOURS
Refills: 0 | Status: DISCONTINUED | OUTPATIENT
Start: 2020-04-09 | End: 2020-04-09

## 2020-04-09 RX ORDER — MORPHINE SULFATE 50 MG/1
2 CAPSULE, EXTENDED RELEASE ORAL
Refills: 0 | Status: DISCONTINUED | OUTPATIENT
Start: 2020-04-09 | End: 2020-04-09

## 2020-04-09 RX ORDER — SODIUM CHLORIDE 9 MG/ML
1000 INJECTION INTRAMUSCULAR; INTRAVENOUS; SUBCUTANEOUS ONCE
Refills: 0 | Status: COMPLETED | OUTPATIENT
Start: 2020-04-09 | End: 2020-04-09

## 2020-04-09 RX ADMIN — ENOXAPARIN SODIUM 50 MILLIGRAM(S): 100 INJECTION SUBCUTANEOUS at 06:44

## 2020-04-09 RX ADMIN — Medication 100 MILLIGRAM(S): at 06:44

## 2020-04-09 RX ADMIN — MORPHINE SULFATE 2 MILLIGRAM(S): 50 CAPSULE, EXTENDED RELEASE ORAL at 07:01

## 2020-04-09 RX ADMIN — MORPHINE SULFATE 2 MILLIGRAM(S): 50 CAPSULE, EXTENDED RELEASE ORAL at 10:52

## 2020-04-09 RX ADMIN — SODIUM CHLORIDE 1000 MILLILITER(S): 9 INJECTION INTRAMUSCULAR; INTRAVENOUS; SUBCUTANEOUS at 01:47

## 2020-04-09 NOTE — CHART NOTE - NSCHARTNOTEFT_GEN_A_CORE
Patient's SBP unresponsive to IVF. Patient is not a candidate for pressors. Her condition is declining and patient is not able to take PO meds. Called patient's parents, Jaime and Ada, and explained patient's condition. They were understanding and agreed to comfort measures.   - Will d/c vitals, no RRTs, no labs, comfort measures.   - Patient has morphine 2mg IVP q2hrs prn respiratory distress/ agitation.   - atrophine opthalmic for sublingual for oral secretions  - artificial tears for dry eyes   - Recommend pleasure feeds if patient can tolerate PO.   - will d/c PO meds   - Dr. Escamilla notified   - Family would like an update during the day if possible.  Becky Mitchell PGY-3 Patient's SBP unresponsive to IVF. Patient is not a candidate for pressors. Her condition is declining and patient is not able to take PO meds. Called patient's parents, Jaime and Ada, and explained patient's condition. They were understanding and agreed to comfort measures.  - Patient has morphine 2mg IVP q2hrs prn respiratory distress/ agitation.   - add atrophine opthalmic for sublingual for oral secretions prn  - add artificial tears for dry eyes prn   - will d/c PO meds as patient Is not tolerating PO currently. Continue IV meds.   - Dr. Escamilla notified   - Family would like an update during the day if possible.  Becky Mitchell PGY-3    ADDENDUM 10:41AM: Discussed with Dr. Escamilla that patient will need to go through [facility] in order to pursue comfort measure. Parents are agreeable to Comfort measure. Pending approval from MH, patient needs to have vitals checked per routine and continue with IV medications. Management per primary team.

## 2020-04-09 NOTE — DISCHARGE NOTE FOR THE EXPIRED PATIENT - HOSPITAL COURSE
patient presented with fevers hypoxia  covid19 sars pn  was intubated extubated  family requested dnr dni  patient did not respond to medical treatment  became hypotensive over night and passed in her sleep  i informed parents

## 2020-04-09 NOTE — PROGRESS NOTE ADULT - NSREFPHYEXREFTO_GEN_ALL_CORE
Inpatient Physical Exam
ED Physical Exam
Inpatient Physical Exam

## 2020-04-09 NOTE — PROGRESS NOTE ADULT - ASSESSMENT
59f with cerebral palsy, seizure, copd admitted with hypoxia and is COVID 19+  COVID-19---high risk period for decompensation  (7-14 days post symptom onset), so critical to determine date of symptom onset,  avoid aerosolizing procedures,  focus on supportive care, avoid excessive blood draws so limit lab testing and draws - do recommend for hospitalized patients  -For all patients: daily BMP and CBC w diff to follow eos, lymphocytes and neutrophils and daily   NLR baseline and dynamic calculation (NLR <3 low vs >5 high) -other labs at admission to risk stratify and predict clinical course,  Procalcitonin (>0.2 associated with more severe disease),  Ferritin (lower risk <450 vs >850),  CRP (low risk <2 and higher risk >6) , D-dimer (<1,000 vs >1,000)   and if prognosis is unclear or if immunomodulators being considered: LDH, ESR, PT/PTT, CK, total, CKMB mass assay, lactate, troponin, IL-6 (and other interleukins as indicated)  -antibiotics only if there is concern for a bacterial process, HCQ 400mg PO BID day 1 followed by 200mg PO BID x 4 days may play a role if started early in disease, noted that methylprednisolone (SOLU-medrol) 1mg/kg/day IV x 5 days at onset of increased oxygen requirement (potential to finsih w PO) and IL6 inhibition during this critical window may be associated with avoiding intubation and improved outcomes  4/2 NLR =9.5 STEROIDS started with plan for 5 days-prognosis is guarded  4/3 NLR pending follow and focus on supportive care  4/7 NLR >20  4/9- resp in the 40s pt doing poorly and pt is DNR  pt is actively dying

## 2020-04-09 NOTE — PROGRESS NOTE ADULT - PROBLEM SELECTOR PROBLEM 1
Acute respiratory failure with hypoxia
Acute respiratory failure with hypoxia
Hypoxia
SARS virus pneumonia

## 2020-04-09 NOTE — DISCHARGE NOTE FOR THE EXPIRED PATIENT - NS EXPIRED ORGANCONFIRMRES
Admission huddle with Dr. Smith, Arturo Murphy (Charge RN) and myself.  Reviewed ABG/CBG, glucose, chest Xray and vital signs.  No new orders received. Continue to monitor.         No

## 2020-04-09 NOTE — CHART NOTE - NSCHARTNOTEFT_GEN_A_CORE
Called by RN, patient with bp 64/40 and O2 saturation in 70s. Patient is DNR/DNI. Family not ready to make patient comfort measures. Call placed to Dr. Ad Mosqueda who would like a 1L NS bolus over one hour and maintenance fluids at 100cc/hr. Pressors would be of no utlility and will not be offered as patient is DNR/DNI and pressors will not change outcome.  Will continue to monitor. RN to call with any changes. Overall prognosis very poor. Called by RN, patient with bp 64/40 and O2 saturation in 70s. Patient is DNR/DNI. Family not ready to make patient comfort measures. Call placed to Dr. Ad Mosqueda who would like a 1L NS bolus over one hour and maintenance fluids. Pressors would be of no utlility and will not be offered as patient is DNR/DNI and pressors will not change outcome.  Will continue to monitor. RN to call with any changes. Overall prognosis very poor.

## 2020-04-09 NOTE — PROGRESS NOTE ADULT - SUBJECTIVE AND OBJECTIVE BOX
[place]    PLV 1EAS 102 D1    Patient is a 59y old  Female who presents with a chief complaint of covid19+  was sent for low oxygen (09 Apr 2020 13:54)       Allergies    barbiturates (Unknown)  Colace (Unknown)  docusate (Unknown)  phenobarbital (Unknown)    Intolerances        HPI:  patient was tested +covid19 few days ago  was sent by house for low oxygen (29 Mar 2020 01:07)      PAST MEDICAL & SURGICAL HISTORY:  Osteoporosis  CP (cerebral palsy)  Scoliosis  Mentally challenged  Epilepsy  COPD (chronic obstructive pulmonary disease)  PVD (peripheral vascular disease)  Osteoporosis  Chest pain  Scoliosis  Seizure  MR (mental retardation), severe: immobilty sec to severe dissability  No significant past surgical history      FAMILY HISTORY:        MEDICATIONS   acetaminophen  Suppository .. 650 milliGRAM(s) Rectal every 4 hours PRN  acetaminophen  Suppository .. 650 milliGRAM(s) Rectal every 4 hours PRN  ALBUTerol    90 MICROgram(s) HFA Inhaler 2 Puff(s) Inhalation every 6 hours  artificial  tears Solution 2 Drop(s) Both EYES every 1 hour PRN  atropine 1% Ophthalmic Solution for SubLingual Use 4 Drop(s) SubLingual every 4 hours PRN  dextrose 5%. 1000 milliLiter(s) IV Continuous <Continuous>  enoxaparin Injectable 50 milliGRAM(s) SubCutaneous two times a day  latanoprost 0.005% Ophthalmic Solution 1 Drop(s) Both EYES at bedtime  metoprolol tartrate Injectable 5 milliGRAM(s) IV Push every 6 hours  morphine  - Injectable 2 milliGRAM(s) IV Push every 2 hours PRN  tiotropium 18 MICROgram(s) Capsule 1 Capsule(s) Inhalation daily  valproate sodium IVPB 250 milliGRAM(s) IV Intermittent three times a day      Vital Signs Last 24 Hrs  T(C): 36.8 (09 Apr 2020 10:43), Max: 37.3 (09 Apr 2020 04:00)  T(F): 98.3 (09 Apr 2020 10:43), Max: 99.2 (09 Apr 2020 04:00)  HR: 154 (09 Apr 2020 10:43) (102 - 171)  BP: 40/22 (09 Apr 2020 10:43) (40/22 - 93/63)  BP(mean): --  RR: 13 (09 Apr 2020 10:43) (13 - 45)  SpO2: 63% (09 Apr 2020 04:00) (63% - 79%)            LABS:                    WBC:  WBC Count: 36.39 K/uL (04-07 @ 12:06)  WBC Count: 7.43 K/uL (04-06 @ 06:47)      MICROBIOLOGY:  RECENT CULTURES:                  Sodium:  Sodium, Serum: 147 mmol/L (04-07 @ 12:06)  Sodium, Serum: 154 mmol/L (04-06 @ 06:47)      1.40 mg/dL 04-07 @ 12:06  0.35 mg/dL 04-06 @ 06:47      Hemoglobin:  Hemoglobin: 15.2 g/dL (04-07 @ 12:06)  Hemoglobin: 11.0 g/dL (04-06 @ 06:47)      Platelets: Platelet Count - Automated: 237 K/uL (04-07 @ 12:06)  Platelet Count - Automated: 343 K/uL (04-06 @ 06:47)              RADIOLOGY & ADDITIONAL STUDIES:

## 2020-04-09 NOTE — CHART NOTE - NSCHARTNOTEFT_GEN_A_CORE
Called by RN for pt . Pt seen and examined at bedside, no response to verbal or pain stimuli. No cardiopulmonary function noted on exam. No response to cornea reflex or sternal rub. No central or distal pulse appreciated. Time of death: 13:45. Dr. Escamilla aware, to inform family.

## 2020-04-09 NOTE — PROGRESS NOTE ADULT - REASON FOR ADMISSION
covid19+  was sent for low oxygen

## 2020-05-28 PROCEDURE — 83615 LACTATE (LD) (LDH) ENZYME: CPT

## 2020-05-28 PROCEDURE — 83735 ASSAY OF MAGNESIUM: CPT

## 2020-05-28 PROCEDURE — 94640 AIRWAY INHALATION TREATMENT: CPT

## 2020-05-28 PROCEDURE — 84100 ASSAY OF PHOSPHORUS: CPT

## 2020-05-28 PROCEDURE — 84484 ASSAY OF TROPONIN QUANT: CPT

## 2020-05-28 PROCEDURE — 82962 GLUCOSE BLOOD TEST: CPT

## 2020-05-28 PROCEDURE — 85027 COMPLETE CBC AUTOMATED: CPT

## 2020-05-28 PROCEDURE — 96361 HYDRATE IV INFUSION ADD-ON: CPT

## 2020-05-28 PROCEDURE — 82803 BLOOD GASES ANY COMBINATION: CPT

## 2020-05-28 PROCEDURE — 85379 FIBRIN DEGRADATION QUANT: CPT

## 2020-05-28 PROCEDURE — 80053 COMPREHEN METABOLIC PANEL: CPT

## 2020-05-28 PROCEDURE — 83880 ASSAY OF NATRIURETIC PEPTIDE: CPT

## 2020-05-28 PROCEDURE — 87040 BLOOD CULTURE FOR BACTERIA: CPT

## 2020-05-28 PROCEDURE — 82728 ASSAY OF FERRITIN: CPT

## 2020-05-28 PROCEDURE — 71045 X-RAY EXAM CHEST 1 VIEW: CPT

## 2020-05-28 PROCEDURE — 82550 ASSAY OF CK (CPK): CPT

## 2020-05-28 PROCEDURE — 83036 HEMOGLOBIN GLYCOSYLATED A1C: CPT

## 2020-05-28 PROCEDURE — 93005 ELECTROCARDIOGRAM TRACING: CPT

## 2020-05-28 PROCEDURE — 99285 EMERGENCY DEPT VISIT HI MDM: CPT

## 2020-05-28 PROCEDURE — 36415 COLL VENOUS BLD VENIPUNCTURE: CPT

## 2020-05-28 PROCEDURE — 85730 THROMBOPLASTIN TIME PARTIAL: CPT

## 2020-05-28 PROCEDURE — 36600 WITHDRAWAL OF ARTERIAL BLOOD: CPT

## 2020-05-28 PROCEDURE — 99053 MED SERV 10PM-8AM 24 HR FAC: CPT

## 2020-05-28 PROCEDURE — 85610 PROTHROMBIN TIME: CPT

## 2020-05-28 PROCEDURE — 84145 PROCALCITONIN (PCT): CPT

## 2020-05-28 PROCEDURE — 85652 RBC SED RATE AUTOMATED: CPT

## 2020-05-28 PROCEDURE — 86140 C-REACTIVE PROTEIN: CPT

## 2020-05-28 PROCEDURE — 83605 ASSAY OF LACTIC ACID: CPT

## 2021-01-01 NOTE — PROGRESS NOTE ADULT - SUBJECTIVE AND OBJECTIVE BOX
[facility]    PLV 1EAS 102 D1    Patient is a 59y old  Female who presents with a chief complaint of covid19+  was sent for low oxygen (08 Apr 2020 11:46)       Allergies    barbiturates (Unknown)  Colace (Unknown)  docusate (Unknown)  phenobarbital (Unknown)    Intolerances        HPI:  patient was tested +covid19 few days ago  was sent by house for low oxygen (29 Mar 2020 01:07)      PAST MEDICAL & SURGICAL HISTORY:  Osteoporosis  CP (cerebral palsy)  Scoliosis  Mentally challenged  Epilepsy  COPD (chronic obstructive pulmonary disease)  PVD (peripheral vascular disease)  Osteoporosis  Chest pain  Scoliosis  Seizure  MR (mental retardation), severe: immobilty sec to severe dissability  No significant past surgical history      FAMILY HISTORY:        MEDICATIONS   acetaminophen  Suppository .. 650 milliGRAM(s) Rectal every 4 hours PRN  acetaminophen  Suppository .. 650 milliGRAM(s) Rectal every 4 hours PRN  ALBUTerol    90 MICROgram(s) HFA Inhaler 2 Puff(s) Inhalation every 6 hours  ascorbic acid 500 milliGRAM(s) Oral two times a day  calcium carbonate 1250 mG  + Vitamin D (OsCal 500 + D) 1 Tablet(s) Oral daily  cholecalciferol 2000 Unit(s) Oral two times a day  dextrose 5%. 1000 milliLiter(s) IV Continuous <Continuous>  digoxin     Tablet 0.125 milliGRAM(s) Oral daily  diltiazem    Tablet 30 milliGRAM(s) Oral four times a day  enoxaparin Injectable 50 milliGRAM(s) SubCutaneous two times a day  lactulose Syrup 10 Gram(s) Oral three times a day  latanoprost 0.005% Ophthalmic Solution 1 Drop(s) Both EYES at bedtime  metoprolol tartrate Injectable 5 milliGRAM(s) IV Push every 6 hours  midodrine. 5 milliGRAM(s) Oral three times a day  montelukast 10 milliGRAM(s) Oral at bedtime  morphine  - Injectable 2 milliGRAM(s) IV Push every 6 hours PRN  pantoprazole   Suspension 40 milliGRAM(s) Oral daily  senna 2 Tablet(s) Oral at bedtime  tiotropium 18 MICROgram(s) Capsule 1 Capsule(s) Inhalation daily  valproate sodium IVPB 250 milliGRAM(s) IV Intermittent three times a day      Vital Signs Last 24 Hrs  T(C): 37.1 (08 Apr 2020 13:02), Max: 37.3 (07 Apr 2020 14:58)  T(F): 98.8 (08 Apr 2020 13:02), Max: 99.1 (07 Apr 2020 14:58)  HR: 67 (08 Apr 2020 13:02) (67 - 108)  BP: 87/48 (08 Apr 2020 13:02) (87/48 - 110/39)  BP(mean): --  RR: 20 (08 Apr 2020 13:02) (15 - 22)  SpO2: 78% (08 Apr 2020 13:02) (78% - 82%)      04-07-20 @ 07:01  -  04-08-20 @ 07:00  --------------------------------------------------------  IN: 400 mL / OUT: 50 mL / NET: 350 mL            LABS:                        15.2   36.39 )-----------( 237      ( 07 Apr 2020 12:06 )             46.0     04-07    147<H>  |  107  |  38<H>  ----------------------------<  124<H>  4.6   |  37<H>  |  1.40<H>    Ca    9.2      07 Apr 2020 12:06  Phos  2.8     04-07  Mg     1.9     04-07    TPro  6.2  /  Alb  2.5<L>  /  TBili  0.6  /  DBili  x   /  AST  50<H>  /  ALT  32  /  AlkPhos  66  04-07    PT/INR - ( 07 Apr 2020 09:26 )   PT: 16.2 sec;   INR: 1.43 ratio         PTT - ( 07 Apr 2020 09:26 )  PTT:44.1 sec          WBC:  WBC Count: 36.39 K/uL (04-07 @ 12:06)  WBC Count: 7.43 K/uL (04-06 @ 06:47)  WBC Count: 7.84 K/uL (04-05 @ 07:07)  WBC Count: 8.22 K/uL (04-04 @ 14:39)      MICROBIOLOGY:  RECENT CULTURES:        CARDIAC MARKERS ( 07 Apr 2020 12:06 )  <.015 ng/mL / x     / x     / x     / x            PT/INR - ( 07 Apr 2020 09:26 )   PT: 16.2 sec;   INR: 1.43 ratio         PTT - ( 07 Apr 2020 09:26 )  PTT:44.1 sec    Sodium:  Sodium, Serum: 147 mmol/L (04-07 @ 12:06)  Sodium, Serum: 154 mmol/L (04-06 @ 06:47)  Sodium, Serum: 156 mmol/L (04-05 @ 07:07)  Sodium, Serum: 153 mmol/L (04-04 @ 14:39)      1.40 mg/dL 04-07 @ 12:06  0.35 mg/dL 04-06 @ 06:47  0.43 mg/dL 04-05 @ 07:07  0.40 mg/dL 04-04 @ 14:39      Hemoglobin:  Hemoglobin: 15.2 g/dL (04-07 @ 12:06)  Hemoglobin: 11.0 g/dL (04-06 @ 06:47)  Hemoglobin: 12.8 g/dL (04-05 @ 07:07)  Hemoglobin: 12.8 g/dL (04-04 @ 14:39)      Platelets: Platelet Count - Automated: 237 K/uL (04-07 @ 12:06)  Platelet Count - Automated: 343 K/uL (04-06 @ 06:47)  Platelet Count - Automated: 508 K/uL (04-05 @ 07:07)  Platelet Count - Automated: 414 K/uL (04-04 @ 14:39)      LIVER FUNCTIONS - ( 07 Apr 2020 12:06 )  Alb: 2.5 g/dL / Pro: 6.2 g/dL / ALK PHOS: 66 U/L / ALT: 32 U/L / AST: 50 U/L / GGT: x                 RADIOLOGY & ADDITIONAL STUDIES: 2021 05:05

## 2022-01-01 NOTE — ED ADULT NURSE REASSESSMENT NOTE - GLASGOW COMA SCALE: BEST VERBAL RESPONSE
(V4) confused Speech and Swallow  [facility]  [address]. [place], [place]  [phone]  Approximately same time frame as ENT (1 month)

## 2022-03-25 NOTE — PATIENT PROFILE ADULT - PRIMARY ROLES/RESPONSIBILITIES
Patient presenting to  ED on 3/25 requesting detoxification. Patient reports that she was taking 100mg of morphine which later reduced to 60mg BID for spinal stenosis for 20yrs.  SW met with patient at bedside and introduce self and SW role and discussed detox program and pain management process.  Patient stated that she is unsure of pursuing detox because it would have an impact on pain medication regimen. Patient stated that she has been seeing a pain management specialist in [place], [phone] for 20 years.  The patient also reported taking Xanax 2mg BID. Patient stated that she experienced withdrawal symptoms, such as shivers, sweating and headaches, on the 31st day of her monthly pain regimen.  Patient stated that she is unable to control her stress level which causes medical issues. Patient refused an option of speaking with a mental health therapist to help with managing stress. SW offered to explore detox programs and present findings to patient for decision making.  SW to call detox facilities to enquire about bed availability. none

## 2022-06-23 NOTE — ED ADULT NURSE NOTE - OBJECTIVE STATEMENT
pt GHR BIB family for loose BM today decreased PO intake reported tmax 99.5 Ftsg Text: The defect edges were debeveled with a #15 scalpel blade.  Given the location of the defect, shape of the defect and the proximity to free margins a full thickness skin graft was deemed most appropriate.  Using a sterile surgical marker, the primary defect shape was transferred to the donor site. The area thus outlined was incised deep to adipose tissue with a #15 scalpel blade.  The harvested graft was then trimmed of adipose tissue until only dermis and epidermis was left.  The skin margins of the secondary defect were undermined to an appropriate distance in all directions utilizing iris scissors.  The secondary defect was closed with interrupted buried subcutaneous sutures.  The skin edges were then re-apposed with running  sutures.  The skin graft was then placed in the primary defect and oriented appropriately.

## 2023-03-09 NOTE — PROGRESS NOTE ADULT - SUBJECTIVE AND OBJECTIVE BOX
Provider at bedside   PAST MEDICAL & SURGICAL HISTORY:  Osteoporosis  CP (cerebral palsy)  Scoliosis  Mentally challenged  Epilepsy  COPD (chronic obstructive pulmonary disease)  PVD (peripheral vascular disease)  Osteoporosis  Chest pain  Scoliosis  Seizure  MR (mental retardation), severe: immobilty sec to severe dissability  No significant past surgical history      MEDICATIONS  (STANDING):  ALBUTerol    90 MICROgram(s) HFA Inhaler 2 Puff(s) Inhalation every 6 hours  ascorbic acid 500 milliGRAM(s) Oral two times a day  calcium carbonate 1250 mG  + Vitamin D (OsCal 500 + D) 1 Tablet(s) Oral daily  cholecalciferol 2000 Unit(s) Oral two times a day  dextrose 5%. 1000 milliLiter(s) (50 mL/Hr) IV Continuous <Continuous>  digoxin     Tablet 0.125 milliGRAM(s) Oral daily  diltiazem    Tablet 30 milliGRAM(s) Oral four times a day  enoxaparin Injectable 50 milliGRAM(s) SubCutaneous two times a day  hydroxychloroquine   Oral   hydroxychloroquine 200 milliGRAM(s) Oral every 12 hours  lactulose Syrup 10 Gram(s) Oral three times a day  latanoprost 0.005% Ophthalmic Solution 1 Drop(s) Both EYES at bedtime  metoprolol tartrate Injectable 5 milliGRAM(s) IV Push every 6 hours  midodrine. 5 milliGRAM(s) Oral three times a day  montelukast 10 milliGRAM(s) Oral at bedtime  pantoprazole   Suspension 40 milliGRAM(s) Oral daily  senna 2 Tablet(s) Oral at bedtime  tiotropium 18 MICROgram(s) Capsule 1 Capsule(s) Inhalation daily  valproate sodium IVPB 250 milliGRAM(s) IV Intermittent three times a day    MEDICATIONS  (PRN):  acetaminophen  Suppository .. 650 milliGRAM(s) Rectal every 4 hours PRN Temp greater or equal to 38C (100.4F)  acetaminophen  Suppository .. 650 milliGRAM(s) Rectal every 4 hours PRN Mild Pain (1 - 3)  morphine  - Injectable 2 milliGRAM(s) IV Push every 6 hours PRN Moderate Pain (4 - 6) or Aggitation or Tacypenia > 40      Patient is a 59y old  Female who presents with a chief complaint of covid19+  was sent for low oxygen (07 Apr 2020 11:03)      Vital Signs Last 24 Hrs  T(C): 37.3 (07 Apr 2020 14:58), Max: 37.3 (07 Apr 2020 14:58)  T(F): 99.1 (07 Apr 2020 14:58), Max: 99.1 (07 Apr 2020 14:58)  HR: 78 (07 Apr 2020 14:58) (62 - 95)  BP: 110/39 (07 Apr 2020 14:58) (75/27 - 110/39)  BP(mean): --  RR: 15 (07 Apr 2020 14:58) (14 - 34)  SpO2: 82% (07 Apr 2020 14:58) (78% - 95%)          04-06 @ 07:01  -  04-07 @ 07:00  --------------------------------------------------------  IN: 850 mL / OUT: 280 mL / NET: 570 mL        REVIEW OF SYSTEMS:    Constitutional: No fever, weight loss or fatigue  Eyes: No eye pain, visual disturbances, or discharge  ENT:  No difficulty hearing, tinnitus, vertigo; No sinus or throat pain  Neck: No pain or stiffness  Breasts: No pain, masses or nipple discharge  Respiratory: No cough, wheezing, chills or hemoptysis  Cardiovascular: No chest pain, palpitations, shortness of breath, dizziness or leg swelling  Gastrointestinal: No abdominal or epigastric pain. No nausea, vomiting or hematemesis; No diarrhea or constipation. No melena or hematochezia.  Genitourinary: No dysuria, frequency, hematuria or incontinence  Rectal: No pain, hemorrhoids or incontinence  Neurological: No headaches, memory loss, loss of strength, numbness or tremors  Skin: No itching, burning, rashes or lesions   Lymph Nodes: No enlarged glands  Endocrine: No heat or cold intolerance; No hair loss  Musculoskeletal: No joint pain or swelling; No muscle, back or extremity pain  Psychiatric: No depression, anxiety, mood swings or difficulty sleeping  Heme/Lymph: No easy bruising or bleeding gums  Allergy and Immunologic: No hives or eczema    PHYSICAL EXAM:  lethargic  not eating  not eating  Respiratory: rhonchi   Cardiovascular: irreg  Gastrointestinal: soft  Extremities: No peripheral edema  Neurological:responds to deep stimuli  Skin: No rashes      decubiti: none                          15.2   36.39 )-----------( 237      ( 07 Apr 2020 12:06 )             46.0     04-07    147<H>  |  107  |  38<H>  ----------------------------<  124<H>  4.6   |  37<H>  |  1.40<H>    Ca    9.2      07 Apr 2020 12:06  Phos  2.8     04-07  Mg     1.9     04-07    TPro  6.2  /  Alb  2.5<L>  /  TBili  0.6  /  DBili  x   /  AST  50<H>  /  ALT  32  /  AlkPhos  66  04-07    PT/INR - ( 07 Apr 2020 09:26 )   PT: 16.2 sec;   INR: 1.43 ratio         PTT - ( 07 Apr 2020 09:26 )  PTT:44.1 sec    CARDIAC MARKERS ( 07 Apr 2020 12:06 )  <.015 ng/mL / x     / x     / x     / x      CARDIAC MARKERS ( 06 Apr 2020 06:47 )  <.015 ng/mL / x     / x     / x     / x          04-07 @ 12:06    : --  Troponin:  <.015    --            Radiology:

## 2023-11-07 NOTE — ASSESSMENT
[FreeTextEntry1] : Patient with advanced mental retardation ,uncooperative  CHADS vasc 1  .Patient is high risk for falls \par after discussion with parents , though patient at mild increased risk of stroke ,atrial fibrillation with controlled rate   \par patients parents understand the risk and benefits \par \par Continue current medications. \par \par follow up after 6 months 
MED

## 2024-04-02 NOTE — ED ADULT TRIAGE NOTE - ISOLATION INDICATION [facility] CONTACT
Use prescriptions as directed. Alternate Tylenol and Ibuprofen as needed for pain/fever. Ensure you are drinking plenty fluids, especially water. Follow up with your primary care provider if no improvement or otherwise as needed. Return to the ED for worsening signs and symptoms or otherwise as needed.     
Other Specify

## 2024-07-01 NOTE — PROGRESS NOTE ADULT - SUBJECTIVE AND OBJECTIVE BOX
Dr. Sage received request by patient's PCP for patient to be seen for foot drop. Attempted to call patient for more information- specifically if he has had a recent MRI of the lumbar spine. No answer- left voicemail to call back  -AZAEL Hampton   PAST MEDICAL & SURGICAL HISTORY:  Osteoporosis  CP (cerebral palsy)  Scoliosis  Mentally challenged  Epilepsy  COPD (chronic obstructive pulmonary disease)  PVD (peripheral vascular disease)  Osteoporosis  Chest pain  Scoliosis  Seizure  MR (mental retardation), severe: immobilty sec to severe dissability  No significant past surgical history      MEDICATIONS  (STANDING):  acetaminophen  Suppository .. 650 milliGRAM(s) Rectal once  ALBUTerol    90 MICROgram(s) HFA Inhaler 2 Puff(s) Inhalation every 6 hours  ascorbic acid 500 milliGRAM(s) Oral two times a day  calcium carbonate 1250 mG  + Vitamin D (OsCal 500 + D) 1 Tablet(s) Oral daily  cholecalciferol 2000 Unit(s) Oral two times a day  diVALproex Sprinkle 375 milliGRAM(s) Oral at bedtime  diVALproex Sprinkle 250 milliGRAM(s) Oral daily  furosemide    Tablet 20 milliGRAM(s) Oral daily  heparin  Injectable 5000 Unit(s) SubCutaneous every 12 hours  lactulose Syrup 10 Gram(s) Oral three times a day  latanoprost 0.005% Ophthalmic Solution 1 Drop(s) Both EYES at bedtime  midodrine. 2.5 milliGRAM(s) Oral three times a day  montelukast 10 milliGRAM(s) Oral at bedtime  pantoprazole   Suspension 40 milliGRAM(s) Oral daily  potassium chloride   Powder 40 milliEquivalent(s) Oral once  senna 2 Tablet(s) Oral at bedtime  sodium chloride 0.9%. 1000 milliLiter(s) (100 mL/Hr) IV Continuous <Continuous>  tiotropium 18 MICROgram(s) Capsule 1 Capsule(s) Inhalation daily  verapamil 40 milliGRAM(s) Oral two times a day    MEDICATIONS  (PRN):  LORazepam   Injectable 0.5 milliGRAM(s) IV Push every 3 hours PRN acute seizures      Patient is a 59y old  Female who presents with a chief complaint of covid19+  was sent for low oxygen (29 Mar 2020 01:07)      Vital Signs Last 24 Hrs  T(C): 37.2 (29 Mar 2020 14:04), Max: 37.2 (29 Mar 2020 14:04)  T(F): 98.9 (29 Mar 2020 14:04), Max: 98.9 (29 Mar 2020 14:04)  HR: 60 (29 Mar 2020 14:04) (60 - 93)  BP: 110/68 (29 Mar 2020 11:06) (92/62 - 112/57)  BP(mean): 77 (29 Mar 2020 14:04) (77 - 77)  RR: 19 (29 Mar 2020 14:04) (18 - 22)  SpO2: 89% (29 Mar 2020 14:04) (89% - 98%)            REVIEW OF SYSTEMS:    Constitutional: No fever, weight loss or fatigue  Eyes: No eye pain, visual disturbances, or discharge  ENT:  No difficulty hearing, tinnitus, vertigo; No sinus or throat pain  Neck: No pain or stiffness  Breasts: No pain, masses or nipple discharge  Respiratory: No cough, wheezing, chills or hemoptysis  Cardiovascular: No chest pain, palpitations, shortness of breath, dizziness or leg swelling  Gastrointestinal: No abdominal or epigastric pain. No nausea, vomiting or hematemesis; No diarrhea or constipation. No melena or hematochezia.  Genitourinary: No dysuria, frequency, hematuria or incontinence  Rectal: No pain, hemorrhoids or incontinence  Neurological: No headaches, memory loss, loss of strength, numbness or tremors  Skin: No itching, burning, rashes or lesions   Lymph Nodes: No enlarged glands  Endocrine: No heat or cold intolerance; No hair loss  Musculoskeletal: No joint pain or swelling; No muscle, back or extremity pain  Psychiatric: No depression, anxiety, mood swings or difficulty sleeping  Heme/Lymph: No easy bruising or bleeding gums  Allergy and Immunologic: No hives or eczema    PHYSICAL EXAM:room air 89 percent  with oxygen over 90 percent  alert her usual self  sitting up  in nad  Constitutional: NAD,   Respiratory: occ wheez   Cardiovascular:irreg  Gastrointestinal: BS+, soft, NT/ND  Extremities: + peripheral edema  Neurological: A/O , no focal deficits  Skin: No rashes      decubiti: none                          13.6   4.70  )-----------( 158      ( 29 Mar 2020 00:27 )             40.9     03-29    143  |  105  |  14  ----------------------------<  90  3.2<L>   |  33<H>  |  0.65    Ca    9.2      29 Mar 2020 00:27    TPro  6.7  /  Alb  3.0<L>  /  TBili  0.2  /  DBili  x   /  AST  32  /  ALT  28  /  AlkPhos  47  03-29    PT/INR - ( 29 Mar 2020 00:27 )   PT: 12.5 sec;   INR: 1.11 ratio         PTT - ( 29 Mar 2020 00:27 )  PTT:25.1 sec                  Radiology:  < from: Xray Chest 1 View AP/PA (03.29.20 @ 00:37) >  EXAM:  XR CHEST AP OR PA 1V                            PROCEDURE DATE:  03/29/2020          INTERPRETATION:  Exam Date: 3/29/2020 12:37 AM    History: Shortness of breath, cough, fever    Technique: Single frontal portable view of the chest with comparison to  12/9/2018    Findings:    Heart is enlarged. Marked elevation of the left hemidiaphragm. Increased bilateral perihilar markings appears unchanged however multifocal pneumonia cannot be excluded. Thoracolumbar scoliosis convex to the right. Degenerative changes of the visualized osseous structures.        Impression:     Increased bilateral perihilar markings appears unchanged however multifocal pneumonia cannot be excluded        < end of copied text >

## 2024-09-04 NOTE — PROGRESS NOTE ADULT - ASSESSMENT
Sunscreen Recommendations: Discussed sunblock should be applied to exposed areas daily, and be reapplied every two hours while exposed . The sunblock should be broad spectrum SPF-50, UVA/UVB and contain Zinc and Titanium Dioxide(Blue Lizard & Sun Bum are some good OTC brands). Strongly recommend Elta MD products. Recommend sun protective clothing such as long sleeve UPF protective shirts, wide brim hats, neck covers and sunglasses as it has been proven to prevent further photo damage from the sun. cont rx Detail Level: Zone cont rx    REVIEW OF SYMPTOMS      Able to give ROS  NO     PHYSICAL EXAM    HEENT Unremarkable PERRLA atraumatic   RESP Fair air entry EXP prolonged    Harsh breath sound Resp distres mild   CARDIAC S1 S2 No S3     NO JVD    ABDOMEN SOFT BS PRESENT NOT DISTENDED No hepatosplenomegaly PEDAL EDEMA present No calf tenderness  NO rash   GENERAL Not TOXIC looking    VITALS/LABS      2019   afeb 81 97/62 18 99%   2019 W 11.2 Hb 13.2 Plt 437 Na 145 K 4.3 CO2 34 Cr .4     JAYA BEJARANO [facility] P [phone]  1960 DOA 2019 DR MODESTA RUBY   ALLERGY      colace phenobarb   CONTACT      mother Ada NEGRETE [phone]       PT DATA/BEST PRACTICE  ADVANCED DIRECTIVE       CODE STATUS: [x ] full code  [ ] DNR  [ ] DNI  [ ] MOLST  Goals of care discussion: [ ] yes   WT      60          BMI    24                                                                                                       HEAD OF BED ELEVATION Yes  DIET  Dysph 1 puree Honey ()   IV F       D5 40 (12/3-)    DVT PROPHYLAXIS lvnx 40 ()     PATIENT DESCRIPTION        CC              2019   From [facility] sob ro pneum             ER VS          2019  108/60 90 18 98%   ER MX                              HPI             2019  59 f from group home with cough sob                            PMH   cp cerebral palsy mental retardation epilepsy osteioporosis scolisosis          HOME MEDS     albuterol asa 325 pulmicort cardizem 300 depakote 125.3 feso4 fa lasix 20 levoxyl 25 loratadine 10 montelukast 10 prolia symbicort                                                  PULMONARY/CRITICAL CARE ASSESSMENT/RECOMMENDATIONS                      RESP TRACT INFECTION   12/3-12/3-----2019 W 20-15-10.5-10.9-10.3-13.2-11.2    12/3-2019 PC .11-.11   -12/3 LA 2.3-.7    blod culture n    urine culture n    Flu AB n RSV n   Leg n  mrsa p n   2019 RVP parainfluenza 1 detected   2019 CXR r lung clear elevcated l diaphragm l lung clear s scoliosis   12/3/2019 CT ch l perihilar and lower lobe consolidation R perihilar gg infiltrate   ARNOL 12/3/2019 As w ws decreasing and as pc is .11 and as cultures are neg it was decided to defer abio If she gets worse will start on zosyn for bact superinfection       RESP GAS EXCHANGE  1l 744/48/74 Gas exchange acceptable   COPD  duoneb.4 () pulmicort () hydrocort 40.3d () montelukast 10 () pred 10 () Cont rx   A fib  ekg a f Cardizem cd 300 () On rate control rx   CHF Lasix 20 (12/3) KCL 10 (12/3) Compensated   NEURO PSYCH depakote 375 () plus depakote 125 () Cont                  PLAN  Parainfluenza 3 resp tract on supp Rx   2019 D5 50 given as Na elevated improved        TIME SPENT Over 25 minutes aggregate care time spent on encounter; activities included   direct pt care, counseling and/or coordinating care reviewing notes, lab data/ imaging , discussion with multidisciplinary team/ pt /family. Risks, benefits, alternatives  discussed in detail. Detail Level: Detailed

## 2025-08-06 NOTE — PROGRESS NOTE ADULT - MINUTES
Spoke w/pts daughter about her EGD that it can't be done till she stops coughing and gets her other health issues checked out.  
40
45
60
45